# Patient Record
Sex: MALE | Race: WHITE | Employment: OTHER | URBAN - METROPOLITAN AREA
[De-identification: names, ages, dates, MRNs, and addresses within clinical notes are randomized per-mention and may not be internally consistent; named-entity substitution may affect disease eponyms.]

---

## 2018-09-02 ENCOUNTER — HOSPITAL ENCOUNTER (OUTPATIENT)
Age: 68
Discharge: HOME | End: 2018-09-02
Attending: EMERGENCY MEDICINE
Payer: MEDICARE

## 2018-09-02 VITALS
HEIGHT: 74 IN | HEART RATE: 90 BPM | DIASTOLIC BLOOD PRESSURE: 95 MMHG | BODY MASS INDEX: 30.16 KG/M2 | RESPIRATION RATE: 18 BRPM | WEIGHT: 235 LBS | OXYGEN SATURATION: 98 % | TEMPERATURE: 99 F | SYSTOLIC BLOOD PRESSURE: 188 MMHG

## 2018-09-02 DIAGNOSIS — J01.90 ACUTE SINUSITIS, RECURRENCE NOT SPECIFIED, UNSPECIFIED LOCATION: Primary | ICD-10-CM

## 2018-09-02 PROCEDURE — 99203 OFFICE O/P NEW LOW 30 MIN: CPT

## 2018-09-02 PROCEDURE — 99204 OFFICE O/P NEW MOD 45 MIN: CPT

## 2018-09-02 RX ORDER — AMOXICILLIN 875 MG/1
875 TABLET, COATED ORAL 2 TIMES DAILY
Qty: 20 TABLET | Refills: 0 | Status: SHIPPED | OUTPATIENT
Start: 2018-09-02 | End: 2018-09-12

## 2018-09-02 NOTE — ED INITIAL ASSESSMENT (HPI)
REPORTS SINUS CONGESTION, ESPECIALLY IN THE MORNING, FOR THE PAST WEEK. DENIES FEVERS, DENIES SORE THROAT.

## 2018-09-02 NOTE — ED PROVIDER NOTES
[facility]    History   Patient presents with:  Cough/URI    Stated Complaint: SINUS PAIN    HPI    Patient 65-year-old male that complains of 7-10 days of sinus drainage and congestion.   He states he wak guarding. Musculoskeletal: Normal range of motion. Exhibits no edema or tenderness. Lymphadenopathy: No cervical adenopathy. Neurological: Alert and oriented to person, place, and time. Normal reflexes. No cranial nerve deficit.  No motor os sensory d

## 2019-01-03 ENCOUNTER — OFFICE VISIT (OUTPATIENT)
Dept: OTOLARYNGOLOGY | Facility: CLINIC | Age: 69
End: 2019-01-03
Payer: MEDICARE

## 2019-01-03 VITALS
TEMPERATURE: 98 F | HEIGHT: 74 IN | WEIGHT: 212 LBS | DIASTOLIC BLOOD PRESSURE: 88 MMHG | SYSTOLIC BLOOD PRESSURE: 151 MMHG | BODY MASS INDEX: 27.21 KG/M2

## 2019-01-03 DIAGNOSIS — J33.9 NASAL POLYPS: Primary | ICD-10-CM

## 2019-01-03 PROCEDURE — 99204 OFFICE O/P NEW MOD 45 MIN: CPT | Performed by: OTOLARYNGOLOGY

## 2019-01-03 PROCEDURE — G0463 HOSPITAL OUTPT CLINIC VISIT: HCPCS | Performed by: OTOLARYNGOLOGY

## 2019-01-03 RX ORDER — CETIRIZINE HYDROCHLORIDE 10 MG/1
10 TABLET ORAL DAILY
COMMUNITY
End: 2019-07-17

## 2019-01-03 RX ORDER — FLUTICASONE PROPIONATE 50 MCG
1 SPRAY, SUSPENSION (ML) NASAL 2 TIMES DAILY
Qty: 1 BOTTLE | Refills: 3 | Status: SHIPPED | OUTPATIENT
Start: 2019-01-03 | End: 2020-01-13

## 2019-01-03 RX ORDER — CLINDAMYCIN HYDROCHLORIDE 300 MG/1
300 CAPSULE ORAL 3 TIMES DAILY
Qty: 42 CAPSULE | Refills: 0 | Status: SHIPPED | OUTPATIENT
Start: 2019-01-03 | End: 2019-01-17

## 2019-01-03 RX ORDER — METHYLPREDNISOLONE 4 MG/1
TABLET ORAL
Qty: 1 PACKAGE | Refills: 0 | Status: SHIPPED | OUTPATIENT
Start: 2019-01-03 | End: 2019-07-17

## 2019-01-03 NOTE — PROGRESS NOTES
Jessika Vasquez. is a 79year old male. Patient presents with:  Nasal Congestion: nasal congestion for 1 month      HISTORY OF PRESENT ILLNESS   1/3/2019    The patient presents with nasal obstruction for 4 months.   He noted that started in August and he and rash. Hema/Lymph Negative Easy bleeding and easy bruising.            PHYSICAL EXAM    /88   Temp 97.5 °F (36.4 °C) (Tympanic)   Ht 6' 2\" (1.88 m)   Wt 212 lb (96.2 kg)   BMI 27.22 kg/m²        Constitutional Normal Overall appearance - Normal. erythematous and I think he has a polyp in the right nasal cavity I recommend that we treat him with steroids and antibiotics and Flonase and also get a CAT scan of the sinuses and have him return.  The risks of steroids were discussed at length with the pa

## 2019-01-21 ENCOUNTER — OFFICE VISIT (OUTPATIENT)
Dept: OTOLARYNGOLOGY | Facility: CLINIC | Age: 69
End: 2019-01-21
Payer: MEDICARE

## 2019-01-21 VITALS
SYSTOLIC BLOOD PRESSURE: 145 MMHG | WEIGHT: 212 LBS | BODY MASS INDEX: 27.21 KG/M2 | TEMPERATURE: 97 F | HEIGHT: 74 IN | DIASTOLIC BLOOD PRESSURE: 81 MMHG

## 2019-01-21 DIAGNOSIS — J33.9 NASAL POLYPS: Primary | ICD-10-CM

## 2019-01-21 PROCEDURE — G0463 HOSPITAL OUTPT CLINIC VISIT: HCPCS | Performed by: OTOLARYNGOLOGY

## 2019-01-21 PROCEDURE — 99214 OFFICE O/P EST MOD 30 MIN: CPT | Performed by: OTOLARYNGOLOGY

## 2019-01-21 NOTE — PROGRESS NOTES
Megan Husain. is a 79year old male. Patient presents with:   Follow - Up: nasal polyps and sinusitis: pt reports feeling much better, slight nasal congestion      HISTORY OF PRESENT ILLNESS   1/3/2019    The patient presents with nasal obstruction for heartbeat/palpitations and syncope. GI Negative Abdominal pain and diarrhea. Endocrine Negative Cold intolerance and heat intolerance. Neuro Negative Tremors. Psych Negative Anxiety and depression.    Integumentary Negative Frequent skin infections, route., Disp: 1 Package, Rfl: 0  ASSESSMENT AND PLAN    1.  Nasal polyps and sinusitis  Greatly improved after treatment with antibiotics and steroids I did discuss the fact that his increased breathing is just a temporary condition because he has polyps wh

## 2019-02-04 ENCOUNTER — HOSPITAL ENCOUNTER (OUTPATIENT)
Dept: CT IMAGING | Facility: HOSPITAL | Age: 69
Discharge: HOME | End: 2019-02-04
Attending: OTOLARYNGOLOGY
Payer: MEDICARE

## 2019-02-04 DIAGNOSIS — J33.9 NASAL POLYPS: ICD-10-CM

## 2019-02-04 PROCEDURE — 70486 CT MAXILLOFACIAL W/O DYE: CPT | Performed by: OTOLARYNGOLOGY

## 2019-02-05 ENCOUNTER — TELEPHONE (OUTPATIENT)
Dept: OTOLARYNGOLOGY | Facility: CLINIC | Age: 69
End: 2019-02-05

## 2019-02-06 ENCOUNTER — OFFICE VISIT (OUTPATIENT)
Dept: OTOLARYNGOLOGY | Facility: CLINIC | Age: 69
End: 2019-02-06
Payer: MEDICARE

## 2019-02-06 VITALS
SYSTOLIC BLOOD PRESSURE: 124 MMHG | DIASTOLIC BLOOD PRESSURE: 80 MMHG | WEIGHT: 212 LBS | TEMPERATURE: 98 F | HEIGHT: 74 IN | BODY MASS INDEX: 27.21 KG/M2

## 2019-02-06 DIAGNOSIS — J33.9 NASAL POLYPS: Primary | ICD-10-CM

## 2019-02-06 DIAGNOSIS — Z01.818 PRE-OP TESTING: ICD-10-CM

## 2019-02-06 PROCEDURE — 99214 OFFICE O/P EST MOD 30 MIN: CPT | Performed by: OTOLARYNGOLOGY

## 2019-02-06 PROCEDURE — G0463 HOSPITAL OUTPT CLINIC VISIT: HCPCS | Performed by: OTOLARYNGOLOGY

## 2019-02-06 NOTE — PROGRESS NOTES
Tor Hurtado. is a 79year old male. Patient presents with:  Results: ct scan done  on 2/4/19      HISTORY OF PRESENT ILLNESS   1/3/2019    The patient presents with nasal obstruction for 4 months.   He noted that started in August and he went into imme Abdominal pain and diarrhea. Endocrine Negative Cold intolerance and heat intolerance. Neuro Negative Tremors. Psych Negative Anxiety and depression. Integumentary Negative Frequent skin infections, pigment change and rash.    Hema/Lymph Negative Ea Nasal polyps and sinusitis  The risks and alternatives   to sinus surgery, including intranasal polypectomy,septal repair and turbinate reduction  were discussed at length with the patient.  I showed the patient's the CAT scan of his sinuses and demonstrate

## 2019-05-28 ENCOUNTER — TELEPHONE (OUTPATIENT)
Dept: OTOLARYNGOLOGY | Facility: CLINIC | Age: 69
End: 2019-05-28

## 2019-06-27 ENCOUNTER — HOSPITAL ENCOUNTER (OUTPATIENT)
Dept: GENERAL RADIOLOGY | Facility: HOSPITAL | Age: 69
Discharge: HOME | End: 2019-06-27
Attending: OTOLARYNGOLOGY
Payer: MEDICARE

## 2019-06-27 ENCOUNTER — APPOINTMENT (OUTPATIENT)
Dept: LAB | Facility: HOSPITAL | Age: 69
End: 2019-06-27
Attending: OTOLARYNGOLOGY
Payer: MEDICARE

## 2019-06-27 DIAGNOSIS — Z01.818 PRE-OP TESTING: ICD-10-CM

## 2019-06-27 DIAGNOSIS — Z01.818 PREOP TESTING: ICD-10-CM

## 2019-06-27 PROCEDURE — 93010 ELECTROCARDIOGRAM REPORT: CPT | Performed by: OTOLARYNGOLOGY

## 2019-06-27 PROCEDURE — 71046 X-RAY EXAM CHEST 2 VIEWS: CPT | Performed by: OTOLARYNGOLOGY

## 2019-06-27 PROCEDURE — 93005 ELECTROCARDIOGRAM TRACING: CPT

## 2019-06-28 ENCOUNTER — OFFICE VISIT (OUTPATIENT)
Dept: OTOLARYNGOLOGY | Facility: CLINIC | Age: 69
End: 2019-06-28
Payer: MEDICARE

## 2019-06-28 VITALS
WEIGHT: 210 LBS | SYSTOLIC BLOOD PRESSURE: 147 MMHG | HEIGHT: 74 IN | TEMPERATURE: 98 F | BODY MASS INDEX: 26.95 KG/M2 | DIASTOLIC BLOOD PRESSURE: 81 MMHG

## 2019-06-28 DIAGNOSIS — J33.9 NASAL POLYPS: Primary | ICD-10-CM

## 2019-06-28 PROCEDURE — G0463 HOSPITAL OUTPT CLINIC VISIT: HCPCS | Performed by: OTOLARYNGOLOGY

## 2019-06-28 PROCEDURE — 99214 OFFICE O/P EST MOD 30 MIN: CPT | Performed by: OTOLARYNGOLOGY

## 2019-06-28 NOTE — PROGRESS NOTES
Julia Hardin. is a 81year old male. Patient presents with:  Nose Problem: pt would like to discuss surgery       HISTORY OF PRESENT ILLNESS   1/3/2019    The patient presents with nasal obstruction for 4 months.   He noted that started in August and he family history. History reviewed. No pertinent past medical history. History reviewed. No pertinent surgical history. REVIEW OF SYSTEMS    System Neg/Pos Details   Constitutional Negative Fatigue, fever and weight loss.    ENMT Negative Headaches a Septum deviated with turbinate hypertrophy bilaterally mucosa congestion. Polyps noted    Scan was reviewed at length. I showed him the images in detail.   He has diffuse nasal polyposis filling all the sinuses frontal sphenoid ethmoid maxillary sinusitis

## 2019-07-01 ENCOUNTER — TELEPHONE (OUTPATIENT)
Dept: OTOLARYNGOLOGY | Facility: CLINIC | Age: 69
End: 2019-07-01

## 2019-07-01 RX ORDER — PREDNISONE 20 MG/1
40 TABLET ORAL DAILY
Qty: 8 TABLET | Refills: 0 | Status: SHIPPED | OUTPATIENT
Start: 2019-07-17 | End: 2019-07-21

## 2019-07-01 NOTE — TELEPHONE ENCOUNTER
Received a return call from patient and informed of patient note below ,patient verbalized understanding.

## 2019-07-01 NOTE — TELEPHONE ENCOUNTER
Pt states he was supposed to be prescribed prednisone on 6/28, [facility] has not received rx. Pls send thank you.

## 2019-07-01 NOTE — TELEPHONE ENCOUNTER
LMTCB to inform patient of Rx Prednisone were sent to requested pharmacy,please give patient instruction to take the medicine start on 7/17/19 x 4 days prior to surgery on 7/22/19.

## 2019-07-18 ENCOUNTER — TELEPHONE (OUTPATIENT)
Dept: OTOLARYNGOLOGY | Facility: CLINIC | Age: 69
End: 2019-07-18

## 2019-07-18 NOTE — H&P
Ann-Marie Liu. is a 81year old male. Patient presents with:  Nose Problem: pt would like to discuss surgery         HISTORY OF PRESENT ILLNESS   1/3/2019    The patient presents with nasal obstruction for 4 months.   He noted that started in August and pertinent family history. History reviewed. No pertinent past medical history.     History reviewed. No pertinent surgical history.        REVIEW OF SYSTEMS     System Neg/Pos Details   Constitutional Negative Fatigue, fever and weight loss.    [facility] Nares - Right: Normal Left: Normal. Septum deviated with turbinate hypertrophy bilaterally mucosa congestion. Polyps noted    Scan was reviewed at length. I showed him the images in detail.   He has diffuse nasal polyposis filling all the sinuses frontal

## 2019-07-18 NOTE — TELEPHONE ENCOUNTER
Pt informed will pain medication prescription will not be given before surgery. Pt will receive both prescriptions on the day of surgery.

## 2019-07-18 NOTE — TELEPHONE ENCOUNTER
No answer.  Message left to call the office back.   Pt. States that he is sched to have surgery on Monday 7/22/19, and wants to know if he is able to get his Rx's in advance, instead of waiting to get scripts after his surgery?

## 2019-07-22 ENCOUNTER — ANESTHESIA (OUTPATIENT)
Dept: PREOP | Facility: HOSPITAL | Age: 69
End: 2019-07-22
Payer: MEDICARE

## 2019-07-22 ENCOUNTER — HOSPITAL ENCOUNTER (OUTPATIENT)
Facility: HOSPITAL | Age: 69
Setting detail: HOSPITAL OUTPATIENT SURGERY
Discharge: HOME | End: 2019-07-22
Attending: OTOLARYNGOLOGY | Admitting: OTOLARYNGOLOGY
Payer: MEDICARE

## 2019-07-22 ENCOUNTER — ANESTHESIA EVENT (OUTPATIENT)
Dept: PREOP | Facility: HOSPITAL | Age: 69
End: 2019-07-22
Payer: MEDICARE

## 2019-07-22 VITALS
BODY MASS INDEX: 30.29 KG/M2 | TEMPERATURE: 98 F | OXYGEN SATURATION: 98 % | WEIGHT: 236 LBS | SYSTOLIC BLOOD PRESSURE: 172 MMHG | DIASTOLIC BLOOD PRESSURE: 81 MMHG | HEART RATE: 65 BPM | HEIGHT: 74 IN | RESPIRATION RATE: 16 BRPM

## 2019-07-22 DIAGNOSIS — J32.0 CHRONIC MAXILLARY SINUSITIS: ICD-10-CM

## 2019-07-22 DIAGNOSIS — J32.1 CHRONIC FRONTAL SINUSITIS: ICD-10-CM

## 2019-07-22 DIAGNOSIS — J32.2 CHRONIC ETHMOIDAL SINUSITIS: ICD-10-CM

## 2019-07-22 DIAGNOSIS — J32.3 CHRONIC SPHENOIDAL SINUSITIS: ICD-10-CM

## 2019-07-22 DIAGNOSIS — J33.9 NOSE POLYP: ICD-10-CM

## 2019-07-22 DIAGNOSIS — J34.3 HYPERTROPHY OF NASAL TURBINATES: ICD-10-CM

## 2019-07-22 RX ORDER — HYDROMORPHONE HYDROCHLORIDE 1 MG/ML
0.4 INJECTION, SOLUTION INTRAMUSCULAR; INTRAVENOUS; SUBCUTANEOUS EVERY 5 MIN PRN
Status: DISCONTINUED | OUTPATIENT
Start: 2019-07-22 | End: 2019-07-22

## 2019-07-22 RX ORDER — SODIUM CHLORIDE 0.9 % (FLUSH) 0.9 %
10 SYRINGE (ML) INJECTION AS NEEDED
Status: CANCELLED | OUTPATIENT
Start: 2019-07-22

## 2019-07-22 RX ORDER — NALOXONE HYDROCHLORIDE 0.4 MG/ML
80 INJECTION, SOLUTION INTRAMUSCULAR; INTRAVENOUS; SUBCUTANEOUS AS NEEDED
Status: DISCONTINUED | OUTPATIENT
Start: 2019-07-22 | End: 2019-07-22

## 2019-07-22 RX ORDER — HYDROMORPHONE HYDROCHLORIDE 1 MG/ML
0.2 INJECTION, SOLUTION INTRAMUSCULAR; INTRAVENOUS; SUBCUTANEOUS EVERY 5 MIN PRN
Status: DISCONTINUED | OUTPATIENT
Start: 2019-07-22 | End: 2019-07-22

## 2019-07-22 RX ORDER — SODIUM CHLORIDE, SODIUM LACTATE, POTASSIUM CHLORIDE, CALCIUM CHLORIDE 600; 310; 30; 20 MG/100ML; MG/100ML; MG/100ML; MG/100ML
INJECTION, SOLUTION INTRAVENOUS CONTINUOUS
Status: DISCONTINUED | OUTPATIENT
Start: 2019-07-22 | End: 2019-07-22

## 2019-07-22 RX ORDER — DEXAMETHASONE 6 MG/1
TABLET ORAL
Qty: 4 TABLET | Refills: 0 | Status: SHIPPED | OUTPATIENT
Start: 2019-07-24 | End: 2019-07-22

## 2019-07-22 RX ORDER — MORPHINE SULFATE 2 MG/ML
2 INJECTION, SOLUTION INTRAMUSCULAR; INTRAVENOUS EVERY 10 MIN PRN
Status: DISCONTINUED | OUTPATIENT
Start: 2019-07-22 | End: 2019-07-22

## 2019-07-22 RX ORDER — ACETAMINOPHEN 500 MG
1000 TABLET ORAL ONCE
Status: COMPLETED | OUTPATIENT
Start: 2019-07-22 | End: 2019-07-22

## 2019-07-22 RX ORDER — HYDROCODONE BITARTRATE AND ACETAMINOPHEN 5; 325 MG/1; MG/1
1 TABLET ORAL AS NEEDED
Status: DISCONTINUED | OUTPATIENT
Start: 2019-07-22 | End: 2019-07-22

## 2019-07-22 RX ORDER — HYDROMORPHONE HYDROCHLORIDE 1 MG/ML
0.6 INJECTION, SOLUTION INTRAMUSCULAR; INTRAVENOUS; SUBCUTANEOUS EVERY 5 MIN PRN
Status: DISCONTINUED | OUTPATIENT
Start: 2019-07-22 | End: 2019-07-22

## 2019-07-22 RX ORDER — AMOXICILLIN 500 MG/1
500 CAPSULE ORAL 3 TIMES DAILY
Qty: 21 CAPSULE | Refills: 0 | Status: SHIPPED | OUTPATIENT
Start: 2019-07-22 | End: 2019-07-22

## 2019-07-22 RX ORDER — METOCLOPRAMIDE 10 MG/1
10 TABLET ORAL ONCE
Status: COMPLETED | OUTPATIENT
Start: 2019-07-22 | End: 2019-07-22

## 2019-07-22 RX ORDER — PROCHLORPERAZINE EDISYLATE 5 MG/ML
5 INJECTION INTRAMUSCULAR; INTRAVENOUS ONCE AS NEEDED
Status: DISCONTINUED | OUTPATIENT
Start: 2019-07-22 | End: 2019-07-22

## 2019-07-22 RX ORDER — ONDANSETRON 2 MG/ML
4 INJECTION INTRAMUSCULAR; INTRAVENOUS ONCE AS NEEDED
Status: DISCONTINUED | OUTPATIENT
Start: 2019-07-22 | End: 2019-07-22

## 2019-07-22 RX ORDER — HYDROCODONE BITARTRATE AND ACETAMINOPHEN 5; 325 MG/1; MG/1
2 TABLET ORAL AS NEEDED
Status: DISCONTINUED | OUTPATIENT
Start: 2019-07-22 | End: 2019-07-22

## 2019-07-22 RX ORDER — HYDROMORPHONE HYDROCHLORIDE 1 MG/ML
0.4 INJECTION, SOLUTION INTRAMUSCULAR; INTRAVENOUS; SUBCUTANEOUS
Status: CANCELLED | OUTPATIENT
Start: 2019-07-22 | End: 2019-07-22

## 2019-07-22 RX ORDER — MORPHINE SULFATE 4 MG/ML
4 INJECTION, SOLUTION INTRAMUSCULAR; INTRAVENOUS EVERY 10 MIN PRN
Status: DISCONTINUED | OUTPATIENT
Start: 2019-07-22 | End: 2019-07-22

## 2019-07-22 RX ORDER — MORPHINE SULFATE 10 MG/ML
6 INJECTION, SOLUTION INTRAMUSCULAR; INTRAVENOUS EVERY 10 MIN PRN
Status: DISCONTINUED | OUTPATIENT
Start: 2019-07-22 | End: 2019-07-22

## 2019-07-22 RX ORDER — HYDROCODONE BITARTRATE AND ACETAMINOPHEN 7.5; 325 MG/1; MG/1
1 TABLET ORAL EVERY 6 HOURS PRN
Qty: 15 TABLET | Refills: 0 | Status: SHIPPED | OUTPATIENT
Start: 2019-07-22 | End: 2019-07-22

## 2019-07-22 RX ORDER — FAMOTIDINE 20 MG/1
20 TABLET ORAL ONCE
Status: COMPLETED | OUTPATIENT
Start: 2019-07-22 | End: 2019-07-22

## 2019-07-22 NOTE — INTERVAL H&P NOTE
Pre-op Diagnosis: Chronic ethmoidal sinusitis [J32.2]  Chronic sphenoidal sinusitis [J32.3]  Chronic maxillary sinusitis [J32.0]  Chronic frontal sinusitis [J32.1]  Hypertrophy of nasal turbinates [J34.3]  Nose polyp [J33.9]    The above referenced H&P was

## 2019-07-22 NOTE — ANESTHESIA PREPROCEDURE EVALUATION
Anesthesia PreOp Note    HPI:     Reeseville Range. is a 81year old male who presents for preoperative consultation requested by: Callum Posadas MD    Date of Surgery: 7/22/2019    Procedure(s):  NASAL SEPTOPLASTY BILAT SINUS ENDOSCOPY ETHM MAX  Indicati Occupational History      Not on file    Social Needs      Financial resource strain: Not on file      Food insecurity:        Worry: Not on file        Inability: Not on file      Transportation needs:        Medical: Not on file        Non-medical: Not o negative ROS and normal exam   Cardiovascular - negative ROS and normal exam    Neuro/Psych - negative ROS     GI/Hepatic/Renal - negative ROS     Endo/Other - negative ROS   Abdominal  - normal exam  (+) obese,                Anesthesia Plan:   ASA:  2  P

## 2019-07-22 NOTE — PROGRESS NOTES
Case cancelled by anesthesia due to concerns about pts underlying health.  Recommend medical clearance prior to rescheduling

## 2019-07-22 NOTE — OR PREOP
Patient's surgery cancelled by Dr Jeronimo Lipscomb. Patient instructed to follow up with his primary doctor for surgical clearance. IV discontinued. Patient getting dressed. Patient waiting for sister to  him up.

## 2019-07-23 ENCOUNTER — TELEPHONE (OUTPATIENT)
Dept: OTOLARYNGOLOGY | Facility: CLINIC | Age: 69
End: 2019-07-23

## 2019-07-23 NOTE — TELEPHONE ENCOUNTER
Sx cancelled on 07/22/19 due to poor circulation. Per JMK patient will need to get medical clearance prior to rescheduling surgery. LMTCB to inform.

## 2019-09-23 ENCOUNTER — OFFICE VISIT (OUTPATIENT)
Dept: INTERNAL MEDICINE CLINIC | Facility: CLINIC | Age: 69
End: 2019-09-23
Payer: MEDICARE

## 2019-09-23 VITALS
DIASTOLIC BLOOD PRESSURE: 88 MMHG | HEART RATE: 86 BPM | SYSTOLIC BLOOD PRESSURE: 160 MMHG | HEIGHT: 71.3 IN | WEIGHT: 238.19 LBS | BODY MASS INDEX: 32.98 KG/M2 | TEMPERATURE: 99 F | OXYGEN SATURATION: 98 %

## 2019-09-23 DIAGNOSIS — Z12.5 SCREENING PSA (PROSTATE SPECIFIC ANTIGEN): ICD-10-CM

## 2019-09-23 DIAGNOSIS — J33.9 NASAL POLYPOSIS: ICD-10-CM

## 2019-09-23 DIAGNOSIS — I83.93 VARICOSE VEINS OF BOTH LOWER EXTREMITIES, UNSPECIFIED WHETHER COMPLICATED: ICD-10-CM

## 2019-09-23 DIAGNOSIS — M20.60 DEFORMITY OF TOE, UNSPECIFIED LATERALITY: ICD-10-CM

## 2019-09-23 DIAGNOSIS — I10 BENIGN HYPERTENSION: Primary | ICD-10-CM

## 2019-09-23 PROCEDURE — 99204 OFFICE O/P NEW MOD 45 MIN: CPT | Performed by: INTERNAL MEDICINE

## 2019-09-23 PROCEDURE — G0463 HOSPITAL OUTPT CLINIC VISIT: HCPCS | Performed by: INTERNAL MEDICINE

## 2019-09-23 RX ORDER — AMLODIPINE BESYLATE 5 MG/1
5 TABLET ORAL DAILY
Qty: 30 TABLET | Refills: 5 | Status: SHIPPED | OUTPATIENT
Start: 2019-09-23 | End: 2019-10-07

## 2019-09-23 NOTE — PROGRESS NOTES
Shanique Faria. is a 81year old male. HPI:   Patient presents with:  Establish Care: Hasn't had a PCP in 30 years. plans to schedule nasal polyp removal with Dr. Maximiliano Rogers.       Rob Benitez is 82 y/o M here to establish care; has nasal polyps for which he sees Negative for dysuria or polyuria  Hema/Lymph:  Negative for easy bleeding and easy bruising  Integumentary:  Negative for pruritus and rash  Neurological:  Negative for gait disturbance; negative for paresthesias   All other review of systems are negative. (5 mg total) by mouth daily.        Imaging & Referrals:  None     9/23/2019  Lala Colby MD

## 2019-09-25 ENCOUNTER — LAB ENCOUNTER (OUTPATIENT)
Dept: LAB | Age: 69
End: 2019-09-25
Attending: INTERNAL MEDICINE
Payer: MEDICARE

## 2019-09-25 DIAGNOSIS — Z12.5 SCREENING PSA (PROSTATE SPECIFIC ANTIGEN): ICD-10-CM

## 2019-09-25 DIAGNOSIS — I10 BENIGN HYPERTENSION: ICD-10-CM

## 2019-09-25 LAB
ALBUMIN SERPL-MCNC: 3.9 G/DL (ref 3.4–5)
ALBUMIN/GLOB SERPL: 1 {RATIO} (ref 1–2)
ALP LIVER SERPL-CCNC: 55 U/L (ref 45–117)
ALT SERPL-CCNC: 27 U/L (ref 16–61)
ANION GAP SERPL CALC-SCNC: 4 MMOL/L (ref 0–18)
AST SERPL-CCNC: 20 U/L (ref 15–37)
BASOPHILS # BLD AUTO: 0.1 X10(3) UL (ref 0–0.2)
BASOPHILS NFR BLD AUTO: 1.2 %
BILIRUB SERPL-MCNC: 0.7 MG/DL (ref 0.1–2)
BUN BLD-MCNC: 15 MG/DL (ref 7–18)
BUN/CREAT SERPL: 13.8 (ref 10–20)
CALCIUM BLD-MCNC: 9 MG/DL (ref 8.5–10.1)
CHLORIDE SERPL-SCNC: 106 MMOL/L (ref 98–112)
CHOLEST SMN-MCNC: 239 MG/DL (ref ?–200)
CO2 SERPL-SCNC: 30 MMOL/L (ref 21–32)
COMPLEXED PSA SERPL-MCNC: 2.95 NG/ML (ref ?–4)
CREAT BLD-MCNC: 1.09 MG/DL (ref 0.7–1.3)
DEPRECATED RDW RBC AUTO: 42.8 FL (ref 35.1–46.3)
EOSINOPHIL # BLD AUTO: 0.6 X10(3) UL (ref 0–0.7)
EOSINOPHIL NFR BLD AUTO: 7.5 %
ERYTHROCYTE [DISTWIDTH] IN BLOOD BY AUTOMATED COUNT: 13.2 % (ref 11–15)
GLOBULIN PLAS-MCNC: 3.8 G/DL (ref 2.8–4.4)
GLUCOSE BLD-MCNC: 106 MG/DL (ref 70–99)
HCT VFR BLD AUTO: 46.3 % (ref 39–53)
HDLC SERPL-MCNC: 31 MG/DL (ref 40–59)
HGB BLD-MCNC: 15.3 G/DL (ref 13–17.5)
IMM GRANULOCYTES # BLD AUTO: 0.01 X10(3) UL (ref 0–1)
IMM GRANULOCYTES NFR BLD: 0.1 %
LDLC SERPL CALC-MCNC: 145 MG/DL (ref ?–100)
LYMPHOCYTES # BLD AUTO: 2.66 X10(3) UL (ref 1–4)
LYMPHOCYTES NFR BLD AUTO: 33.1 %
M PROTEIN MFR SERPL ELPH: 7.7 G/DL (ref 6.4–8.2)
MCH RBC QN AUTO: 29 PG (ref 26–34)
MCHC RBC AUTO-ENTMCNC: 33 G/DL (ref 31–37)
MCV RBC AUTO: 87.9 FL (ref 80–100)
MONOCYTES # BLD AUTO: 0.59 X10(3) UL (ref 0.1–1)
MONOCYTES NFR BLD AUTO: 7.3 %
NEUTROPHILS # BLD AUTO: 4.08 X10 (3) UL (ref 1.5–7.7)
NEUTROPHILS # BLD AUTO: 4.08 X10(3) UL (ref 1.5–7.7)
NEUTROPHILS NFR BLD AUTO: 50.8 %
NONHDLC SERPL-MCNC: 208 MG/DL (ref ?–130)
OSMOLALITY SERPL CALC.SUM OF ELEC: 291 MOSM/KG (ref 275–295)
PATIENT FASTING: YES
PATIENT FASTING: YES
PLATELET # BLD AUTO: 195 10(3)UL (ref 150–450)
POTASSIUM SERPL-SCNC: 4.4 MMOL/L (ref 3.5–5.1)
RBC # BLD AUTO: 5.27 X10(6)UL (ref 3.8–5.8)
SODIUM SERPL-SCNC: 140 MMOL/L (ref 136–145)
TRIGL SERPL-MCNC: 314 MG/DL (ref 30–149)
TSI SER-ACNC: 2.06 MIU/ML (ref 0.36–3.74)
VLDLC SERPL CALC-MCNC: 63 MG/DL (ref 0–30)
WBC # BLD AUTO: 8 X10(3) UL (ref 4–11)

## 2019-09-25 PROCEDURE — 36415 COLL VENOUS BLD VENIPUNCTURE: CPT

## 2019-09-25 PROCEDURE — 80053 COMPREHEN METABOLIC PANEL: CPT

## 2019-09-25 PROCEDURE — 80061 LIPID PANEL: CPT

## 2019-09-25 PROCEDURE — 85025 COMPLETE CBC W/AUTO DIFF WBC: CPT

## 2019-09-25 PROCEDURE — 84443 ASSAY THYROID STIM HORMONE: CPT

## 2019-10-07 ENCOUNTER — OFFICE VISIT (OUTPATIENT)
Dept: INTERNAL MEDICINE CLINIC | Facility: CLINIC | Age: 69
End: 2019-10-07
Payer: MEDICARE

## 2019-10-07 VITALS
HEIGHT: 71.3 IN | SYSTOLIC BLOOD PRESSURE: 150 MMHG | OXYGEN SATURATION: 99 % | DIASTOLIC BLOOD PRESSURE: 88 MMHG | TEMPERATURE: 99 F | WEIGHT: 242.38 LBS | HEART RATE: 75 BPM | BODY MASS INDEX: 33.56 KG/M2

## 2019-10-07 DIAGNOSIS — Z23 NEED FOR INFLUENZA VACCINATION: ICD-10-CM

## 2019-10-07 DIAGNOSIS — I10 BENIGN HYPERTENSION: Primary | ICD-10-CM

## 2019-10-07 DIAGNOSIS — J33.9 NASAL POLYPOSIS: ICD-10-CM

## 2019-10-07 PROCEDURE — 90662 IIV NO PRSV INCREASED AG IM: CPT | Performed by: INTERNAL MEDICINE

## 2019-10-07 PROCEDURE — 99213 OFFICE O/P EST LOW 20 MIN: CPT | Performed by: INTERNAL MEDICINE

## 2019-10-07 PROCEDURE — G0463 HOSPITAL OUTPT CLINIC VISIT: HCPCS | Performed by: INTERNAL MEDICINE

## 2019-10-07 PROCEDURE — G0008 ADMIN INFLUENZA VIRUS VAC: HCPCS | Performed by: INTERNAL MEDICINE

## 2019-10-07 RX ORDER — AMLODIPINE BESYLATE 10 MG/1
10 TABLET ORAL DAILY
Qty: 30 TABLET | Refills: 5 | Status: SHIPPED | OUTPATIENT
Start: 2019-10-07 | End: 2020-02-21

## 2019-10-07 NOTE — PROGRESS NOTES
Misty Herron. is a 81year old male.     HPI:   Patient presents with:  Checkup: 2 week       82 y/o M with HTN here for F/U; on amlodipine 5 mg po qD;  no CP; no SOB; no headaches; no palpitation        HISTORY:  Past Medical History:   Diagnosis Date non-distended  Extremities: no clubbing, cyanosis or edema           ASSESSMENT/PLAN:   HTN  New-onset;  on amlodipine 5 mg po qD  -increase amlodipine to 10 mg po qD; re-check BP in 2 weeks     Nasal polyposis  Awaits polypectomy with ENT Dr Gabriel Castillo

## 2019-10-21 ENCOUNTER — OFFICE VISIT (OUTPATIENT)
Dept: INTERNAL MEDICINE CLINIC | Facility: CLINIC | Age: 69
End: 2019-10-21
Payer: MEDICARE

## 2019-10-21 VITALS
WEIGHT: 242 LBS | HEART RATE: 68 BPM | BODY MASS INDEX: 33 KG/M2 | SYSTOLIC BLOOD PRESSURE: 132 MMHG | RESPIRATION RATE: 12 BRPM | OXYGEN SATURATION: 98 % | TEMPERATURE: 98 F | DIASTOLIC BLOOD PRESSURE: 72 MMHG

## 2019-10-21 DIAGNOSIS — I10 BENIGN HYPERTENSION: Primary | ICD-10-CM

## 2019-10-21 DIAGNOSIS — M20.60 DEFORMITY OF TOE, UNSPECIFIED LATERALITY: ICD-10-CM

## 2019-10-21 DIAGNOSIS — J33.9 NASAL POLYPOSIS: ICD-10-CM

## 2019-10-21 PROCEDURE — G0463 HOSPITAL OUTPT CLINIC VISIT: HCPCS | Performed by: INTERNAL MEDICINE

## 2019-10-21 PROCEDURE — 99214 OFFICE O/P EST MOD 30 MIN: CPT | Performed by: INTERNAL MEDICINE

## 2019-10-21 NOTE — PROGRESS NOTES
Ole Ocasio. is a 81year old male. HPI:   Patient presents with: Follow - Up: 2 week follow up.  Pt reports he has been feeling well.       80 y/o M with HTN here for F/U; SBP 130s on amlodipine 10 mg po qD;  no CP; no SOB; no headaches; no palpi murmur  Respiratory: lungs without crackles or wheezes  Abdomen: normoactive bowel sounds, soft, non-tender and non-distended  Extremities: no clubbing, cyanosis or edema           ASSESSMENT/PLAN:   HTN  SBP 130s on amlodipine 10 mg po qD; CPM     Nasal p

## 2019-11-27 ENCOUNTER — TELEPHONE (OUTPATIENT)
Dept: OTOLARYNGOLOGY | Facility: CLINIC | Age: 69
End: 2019-11-27

## 2019-11-27 ENCOUNTER — TELEPHONE (OUTPATIENT)
Dept: INTERNAL MEDICINE CLINIC | Facility: CLINIC | Age: 69
End: 2019-11-27

## 2019-11-27 NOTE — TELEPHONE ENCOUNTER
I have written letter for clearance for surgery (see Communications); please FAX to Dr Doreen Nolen office at [facility]; he does not need to see me prior to planned procedure with DR Jose Angel Goode; please call pt

## 2019-11-27 NOTE — TELEPHONE ENCOUNTER
Called patient back. Informed we need medical clearance prior to scheduling surgery again. Per patient will contact his podiatrist to get clearance. States pcp already had cleared him. Will contact pcp office.

## 2019-11-27 NOTE — TELEPHONE ENCOUNTER
As FYI to LIBBY BERNARD - yifan Tabares from DR. Ch office - patient will have sinus surgery - was cancelled in July due to poor circulation in feet which needs to be adressed   LMTCB for patient to schedule pre - op visit

## 2019-11-27 NOTE — TELEPHONE ENCOUNTER
Yu Isidro called from Dr Jamin Alegria office, pt is being scheduled for surgery, need clearance prior  Need letter from Dr Hany Gordon stating pt is cleared for surgery also to include pt circulation is fine, ok to proceed   Ok to enter in [address] [place] [address] Rd   Tasked to nursing

## 2019-11-29 NOTE — TELEPHONE ENCOUNTER
Spoke w/pt regarding message; pt stated he would like to keep his 12/16 appt to follow up with Dr. Silva Prior; pt understands surgical clearance is not needed, no further questions asked

## 2019-12-04 RX ORDER — PREDNISONE 20 MG/1
40 TABLET ORAL DAILY
Qty: 8 TABLET | Refills: 0 | Status: SHIPPED | OUTPATIENT
Start: 2019-12-23 | End: 2019-12-04

## 2019-12-04 RX ORDER — PREDNISONE 20 MG/1
40 TABLET ORAL DAILY
Qty: 8 TABLET | Refills: 0 | Status: SHIPPED | OUTPATIENT
Start: 2019-12-23 | End: 2020-02-21 | Stop reason: ALTCHOICE

## 2019-12-04 NOTE — TELEPHONE ENCOUNTER
Patient contacted. Patient is scheduled for surgery with Dr Meghan Sim on 12/27/19. Pre post op instructions reviewed. NSAID instructions reviewed. Patient aware to take prednisone 4 days prior to sx. Will send Rx to phamacy.

## 2019-12-11 NOTE — H&P
Hugh Ray. is a 81year old male. Patient presents with:  Nose Problem: pt would like to discuss surgery         HISTORY OF PRESENT ILLNESS   1/3/2019    The patient presents with nasal obstruction for 4 months.   He noted that started in August and pertinent family history. History reviewed. No pertinent past medical history.     History reviewed. No pertinent surgical history.        REVIEW OF SYSTEMS     System Neg/Pos Details   Constitutional Negative Fatigue, fever and weight loss.    [facility] Nares - Right: Normal Left: Normal. Septum deviated with turbinate hypertrophy bilaterally mucosa congestion. Polyps noted    Scan was reviewed at length. I showed him the images in detail.   He has diffuse nasal polyposis filling all the sinuses frontal

## 2019-12-16 ENCOUNTER — OFFICE VISIT (OUTPATIENT)
Dept: INTERNAL MEDICINE CLINIC | Facility: CLINIC | Age: 69
End: 2019-12-16
Payer: MEDICARE

## 2019-12-16 VITALS
TEMPERATURE: 98 F | OXYGEN SATURATION: 99 % | HEIGHT: 71.3 IN | HEART RATE: 69 BPM | SYSTOLIC BLOOD PRESSURE: 142 MMHG | WEIGHT: 241 LBS | BODY MASS INDEX: 33.37 KG/M2 | DIASTOLIC BLOOD PRESSURE: 74 MMHG

## 2019-12-16 DIAGNOSIS — R60.0 BILATERAL LEG EDEMA: ICD-10-CM

## 2019-12-16 DIAGNOSIS — I87.2 VENOUS INSUFFICIENCY OF BOTH LOWER EXTREMITIES: ICD-10-CM

## 2019-12-16 DIAGNOSIS — I10 BENIGN HYPERTENSION: Primary | ICD-10-CM

## 2019-12-16 DIAGNOSIS — M20.60 DEFORMITY OF TOE, UNSPECIFIED LATERALITY: ICD-10-CM

## 2019-12-16 DIAGNOSIS — J33.9 NASAL POLYPOSIS: ICD-10-CM

## 2019-12-16 PROCEDURE — G0463 HOSPITAL OUTPT CLINIC VISIT: HCPCS | Performed by: INTERNAL MEDICINE

## 2019-12-16 PROCEDURE — 99214 OFFICE O/P EST MOD 30 MIN: CPT | Performed by: INTERNAL MEDICINE

## 2019-12-16 NOTE — PROGRESS NOTES
Sandrine Mcadams. is a 81year old male.     HPI:   Patient presents with:  Pre-Op: will have nose polyps removed 12/27 /2019-is cleared for surgery , is here for circulation issues       82 y/o M with multiple toe deformities, leg edema here for F/U; pt w Negative for chest pain; negative palpitations  Respiratory:  Negative for cough, dyspnea and wheezing  Endocrine:  Negative for abnormal sleep patterns, increased activity, polydipsia and polyphagia  Gastrointestinal:  Negative for abdominal pain, constip venous insufficiency on 1/24/19 at [place]; saw [address] surgery 11/26/19 and saw APN at [facility]; was advised for LE compression stockings; seeing PV surgery Dr Roel Angeles at [facility] in Feb 2020     PSA screening  PSA 2.95 in Sept 2019  [address] [facility]

## 2019-12-26 ENCOUNTER — TELEPHONE (OUTPATIENT)
Dept: OTOLARYNGOLOGY | Facility: CLINIC | Age: 69
End: 2019-12-26

## 2019-12-26 RX ORDER — PREDNISONE 20 MG/1
TABLET ORAL
Qty: 9 TABLET | Refills: 0 | Status: SHIPPED | OUTPATIENT
Start: 2019-12-28 | End: 2020-02-21 | Stop reason: ALTCHOICE

## 2019-12-26 RX ORDER — HYDROCODONE BITARTRATE AND ACETAMINOPHEN 7.5; 325 MG/1; MG/1
1 TABLET ORAL EVERY 6 HOURS PRN
Qty: 15 TABLET | Refills: 0 | Status: SHIPPED | OUTPATIENT
Start: 2019-12-26 | End: 2020-06-10

## 2019-12-26 RX ORDER — AMOXICILLIN 500 MG/1
500 CAPSULE ORAL 3 TIMES DAILY
Qty: 21 CAPSULE | Refills: 0 | Status: SHIPPED | OUTPATIENT
Start: 2019-12-26 | End: 2020-01-02

## 2019-12-26 NOTE — TELEPHONE ENCOUNTER
Callum Posadas MD  Em Ent Clinical Staff 1 hour ago (9:54 AM)      Please inform the patient that I called in medications for him for after surgery not before surgery there should be 3 at the pharmacy one is for pain one is an antibiotic and one is a stero

## 2019-12-27 ENCOUNTER — ANESTHESIA (OUTPATIENT)
Dept: SURGERY | Facility: HOSPITAL | Age: 69
End: 2019-12-27
Payer: MEDICARE

## 2019-12-27 ENCOUNTER — ANESTHESIA EVENT (OUTPATIENT)
Dept: SURGERY | Facility: HOSPITAL | Age: 69
End: 2019-12-27
Payer: MEDICARE

## 2019-12-27 ENCOUNTER — TELEPHONE (OUTPATIENT)
Dept: OTOLARYNGOLOGY | Facility: CLINIC | Age: 69
End: 2019-12-27

## 2019-12-27 ENCOUNTER — HOSPITAL ENCOUNTER (OUTPATIENT)
Facility: HOSPITAL | Age: 69
Setting detail: HOSPITAL OUTPATIENT SURGERY
Discharge: HOME | End: 2019-12-27
Attending: OTOLARYNGOLOGY | Admitting: OTOLARYNGOLOGY
Payer: MEDICARE

## 2019-12-27 VITALS
DIASTOLIC BLOOD PRESSURE: 69 MMHG | SYSTOLIC BLOOD PRESSURE: 148 MMHG | RESPIRATION RATE: 18 BRPM | TEMPERATURE: 97 F | WEIGHT: 246 LBS | HEART RATE: 66 BPM | OXYGEN SATURATION: 100 % | HEIGHT: 72 IN | BODY MASS INDEX: 33.32 KG/M2

## 2019-12-27 DIAGNOSIS — J33.9 NOSE POLYP: ICD-10-CM

## 2019-12-27 DIAGNOSIS — J32.3 CHRONIC SPHENOIDAL SINUSITIS: ICD-10-CM

## 2019-12-27 DIAGNOSIS — J32.0 CHRONIC MAXILLARY SINUSITIS: ICD-10-CM

## 2019-12-27 DIAGNOSIS — J34.3 HYPERTROPHY OF NASAL TURBINATES: ICD-10-CM

## 2019-12-27 DIAGNOSIS — J32.2 CHRONIC ETHMOIDAL SINUSITIS: ICD-10-CM

## 2019-12-27 DIAGNOSIS — J32.1 CHRONIC FRONTAL SINUSITIS: ICD-10-CM

## 2019-12-27 PROCEDURE — 09BR8ZZ EXCISION OF LEFT MAXILLARY SINUS, VIA NATURAL OR ARTIFICIAL OPENING ENDOSCOPIC: ICD-10-PCS | Performed by: OTOLARYNGOLOGY

## 2019-12-27 PROCEDURE — 09BQ8ZZ EXCISION OF RIGHT MAXILLARY SINUS, VIA NATURAL OR ARTIFICIAL OPENING ENDOSCOPIC: ICD-10-PCS | Performed by: OTOLARYNGOLOGY

## 2019-12-27 PROCEDURE — 31259 NSL/SINS NDSC SPHN TISS RMVL: CPT | Performed by: OTOLARYNGOLOGY

## 2019-12-27 PROCEDURE — 31276 NSL/SINS NDSC FRNT TISS RMVL: CPT | Performed by: OTOLARYNGOLOGY

## 2019-12-27 PROCEDURE — 8E09XBZ COMPUTER ASSISTED PROCEDURE OF HEAD AND NECK REGION: ICD-10-PCS | Performed by: OTOLARYNGOLOGY

## 2019-12-27 PROCEDURE — 30140 RESECT INFERIOR TURBINATE: CPT | Performed by: OTOLARYNGOLOGY

## 2019-12-27 PROCEDURE — 09TU8ZZ RESECTION OF RIGHT ETHMOID SINUS, VIA NATURAL OR ARTIFICIAL OPENING ENDOSCOPIC: ICD-10-PCS | Performed by: OTOLARYNGOLOGY

## 2019-12-27 PROCEDURE — 31267 ENDOSCOPY MAXILLARY SINUS: CPT | Performed by: OTOLARYNGOLOGY

## 2019-12-27 PROCEDURE — 09BS8ZZ EXCISION OF RIGHT FRONTAL SINUS, VIA NATURAL OR ARTIFICIAL OPENING ENDOSCOPIC: ICD-10-PCS | Performed by: OTOLARYNGOLOGY

## 2019-12-27 PROCEDURE — 09SL8ZZ REPOSITION NASAL TURBINATE, VIA NATURAL OR ARTIFICIAL OPENING ENDOSCOPIC: ICD-10-PCS | Performed by: OTOLARYNGOLOGY

## 2019-12-27 PROCEDURE — 09BX8ZZ EXCISION OF LEFT SPHENOID SINUS, VIA NATURAL OR ARTIFICIAL OPENING ENDOSCOPIC: ICD-10-PCS | Performed by: OTOLARYNGOLOGY

## 2019-12-27 PROCEDURE — 61782 SCAN PROC CRANIAL EXTRA: CPT | Performed by: OTOLARYNGOLOGY

## 2019-12-27 PROCEDURE — 09BW8ZZ EXCISION OF RIGHT SPHENOID SINUS, VIA NATURAL OR ARTIFICIAL OPENING ENDOSCOPIC: ICD-10-PCS | Performed by: OTOLARYNGOLOGY

## 2019-12-27 PROCEDURE — 09TL8ZZ RESECTION OF NASAL TURBINATE, VIA NATURAL OR ARTIFICIAL OPENING ENDOSCOPIC: ICD-10-PCS | Performed by: OTOLARYNGOLOGY

## 2019-12-27 PROCEDURE — 09TV8ZZ RESECTION OF LEFT ETHMOID SINUS, VIA NATURAL OR ARTIFICIAL OPENING ENDOSCOPIC: ICD-10-PCS | Performed by: OTOLARYNGOLOGY

## 2019-12-27 PROCEDURE — 09BT8ZZ EXCISION OF LEFT FRONTAL SINUS, VIA NATURAL OR ARTIFICIAL OPENING ENDOSCOPIC: ICD-10-PCS | Performed by: OTOLARYNGOLOGY

## 2019-12-27 DEVICE — PROPEL SINUS IMPLANT
Type: IMPLANTABLE DEVICE | Site: NOSE | Status: FUNCTIONAL
Brand: PROPEL

## 2019-12-27 RX ORDER — ONDANSETRON 2 MG/ML
4 INJECTION INTRAMUSCULAR; INTRAVENOUS ONCE AS NEEDED
Status: DISCONTINUED | OUTPATIENT
Start: 2019-12-27 | End: 2019-12-27

## 2019-12-27 RX ORDER — DIAPER,BRIEF,INFANT-TODD,DISP
EACH MISCELLANEOUS AS NEEDED
Status: DISCONTINUED | OUTPATIENT
Start: 2019-12-27 | End: 2019-12-27 | Stop reason: HOSPADM

## 2019-12-27 RX ORDER — GLYCOPYRROLATE 0.2 MG/ML
INJECTION INTRAMUSCULAR; INTRAVENOUS AS NEEDED
Status: DISCONTINUED | OUTPATIENT
Start: 2019-12-27 | End: 2019-12-27 | Stop reason: SURG

## 2019-12-27 RX ORDER — MORPHINE SULFATE 4 MG/ML
4 INJECTION, SOLUTION INTRAMUSCULAR; INTRAVENOUS EVERY 10 MIN PRN
Status: DISCONTINUED | OUTPATIENT
Start: 2019-12-27 | End: 2019-12-27

## 2019-12-27 RX ORDER — MORPHINE SULFATE 4 MG/ML
2 INJECTION, SOLUTION INTRAMUSCULAR; INTRAVENOUS EVERY 10 MIN PRN
Status: DISCONTINUED | OUTPATIENT
Start: 2019-12-27 | End: 2019-12-27

## 2019-12-27 RX ORDER — EPHEDRINE SULFATE 50 MG/ML
INJECTION, SOLUTION INTRAVENOUS AS NEEDED
Status: DISCONTINUED | OUTPATIENT
Start: 2019-12-27 | End: 2019-12-27 | Stop reason: SURG

## 2019-12-27 RX ORDER — ONDANSETRON 2 MG/ML
INJECTION INTRAMUSCULAR; INTRAVENOUS AS NEEDED
Status: DISCONTINUED | OUTPATIENT
Start: 2019-12-27 | End: 2019-12-27 | Stop reason: SURG

## 2019-12-27 RX ORDER — ACETAMINOPHEN 500 MG
1000 TABLET ORAL ONCE
Status: COMPLETED | OUTPATIENT
Start: 2019-12-27 | End: 2019-12-27

## 2019-12-27 RX ORDER — MORPHINE SULFATE 10 MG/ML
6 INJECTION, SOLUTION INTRAMUSCULAR; INTRAVENOUS EVERY 10 MIN PRN
Status: DISCONTINUED | OUTPATIENT
Start: 2019-12-27 | End: 2019-12-27

## 2019-12-27 RX ORDER — ROCURONIUM BROMIDE 10 MG/ML
INJECTION, SOLUTION INTRAVENOUS AS NEEDED
Status: DISCONTINUED | OUTPATIENT
Start: 2019-12-27 | End: 2019-12-27 | Stop reason: SURG

## 2019-12-27 RX ORDER — SODIUM CHLORIDE, SODIUM LACTATE, POTASSIUM CHLORIDE, CALCIUM CHLORIDE 600; 310; 30; 20 MG/100ML; MG/100ML; MG/100ML; MG/100ML
INJECTION, SOLUTION INTRAVENOUS CONTINUOUS
Status: DISCONTINUED | OUTPATIENT
Start: 2019-12-27 | End: 2019-12-27

## 2019-12-27 RX ORDER — LIDOCAINE HYDROCHLORIDE AND EPINEPHRINE 10; 10 MG/ML; UG/ML
INJECTION, SOLUTION INFILTRATION; PERINEURAL AS NEEDED
Status: DISCONTINUED | OUTPATIENT
Start: 2019-12-27 | End: 2019-12-27 | Stop reason: HOSPADM

## 2019-12-27 RX ORDER — METOCLOPRAMIDE 10 MG/1
10 TABLET ORAL ONCE
Status: COMPLETED | OUTPATIENT
Start: 2019-12-27 | End: 2019-12-27

## 2019-12-27 RX ORDER — DEXAMETHASONE SODIUM PHOSPHATE 4 MG/ML
VIAL (ML) INJECTION AS NEEDED
Status: DISCONTINUED | OUTPATIENT
Start: 2019-12-27 | End: 2019-12-27 | Stop reason: SURG

## 2019-12-27 RX ORDER — HYDROCODONE BITARTRATE AND ACETAMINOPHEN 5; 325 MG/1; MG/1
2 TABLET ORAL AS NEEDED
Status: DISCONTINUED | OUTPATIENT
Start: 2019-12-27 | End: 2019-12-27

## 2019-12-27 RX ORDER — HYDROMORPHONE HYDROCHLORIDE 1 MG/ML
0.2 INJECTION, SOLUTION INTRAMUSCULAR; INTRAVENOUS; SUBCUTANEOUS EVERY 5 MIN PRN
Status: DISCONTINUED | OUTPATIENT
Start: 2019-12-27 | End: 2019-12-27

## 2019-12-27 RX ORDER — PROCHLORPERAZINE EDISYLATE 5 MG/ML
5 INJECTION INTRAMUSCULAR; INTRAVENOUS ONCE AS NEEDED
Status: DISCONTINUED | OUTPATIENT
Start: 2019-12-27 | End: 2019-12-27

## 2019-12-27 RX ORDER — MIDAZOLAM HYDROCHLORIDE 1 MG/ML
INJECTION INTRAMUSCULAR; INTRAVENOUS AS NEEDED
Status: DISCONTINUED | OUTPATIENT
Start: 2019-12-27 | End: 2019-12-27 | Stop reason: SURG

## 2019-12-27 RX ORDER — LIDOCAINE HYDROCHLORIDE 10 MG/ML
INJECTION, SOLUTION EPIDURAL; INFILTRATION; INTRACAUDAL; PERINEURAL AS NEEDED
Status: DISCONTINUED | OUTPATIENT
Start: 2019-12-27 | End: 2019-12-27 | Stop reason: SURG

## 2019-12-27 RX ORDER — HYDROMORPHONE HYDROCHLORIDE 1 MG/ML
0.4 INJECTION, SOLUTION INTRAMUSCULAR; INTRAVENOUS; SUBCUTANEOUS EVERY 5 MIN PRN
Status: DISCONTINUED | OUTPATIENT
Start: 2019-12-27 | End: 2019-12-27

## 2019-12-27 RX ORDER — NALOXONE HYDROCHLORIDE 0.4 MG/ML
80 INJECTION, SOLUTION INTRAMUSCULAR; INTRAVENOUS; SUBCUTANEOUS AS NEEDED
Status: DISCONTINUED | OUTPATIENT
Start: 2019-12-27 | End: 2019-12-27

## 2019-12-27 RX ORDER — HYDROMORPHONE HYDROCHLORIDE 1 MG/ML
0.4 INJECTION, SOLUTION INTRAMUSCULAR; INTRAVENOUS; SUBCUTANEOUS
Status: CANCELLED | OUTPATIENT
Start: 2019-12-27 | End: 2019-12-27

## 2019-12-27 RX ORDER — HYDROCODONE BITARTRATE AND ACETAMINOPHEN 5; 325 MG/1; MG/1
1 TABLET ORAL AS NEEDED
Status: DISCONTINUED | OUTPATIENT
Start: 2019-12-27 | End: 2019-12-27

## 2019-12-27 RX ORDER — LABETALOL HYDROCHLORIDE 5 MG/ML
INJECTION, SOLUTION INTRAVENOUS AS NEEDED
Status: DISCONTINUED | OUTPATIENT
Start: 2019-12-27 | End: 2019-12-27 | Stop reason: SURG

## 2019-12-27 RX ORDER — HYDROMORPHONE HYDROCHLORIDE 1 MG/ML
0.6 INJECTION, SOLUTION INTRAMUSCULAR; INTRAVENOUS; SUBCUTANEOUS EVERY 5 MIN PRN
Status: DISCONTINUED | OUTPATIENT
Start: 2019-12-27 | End: 2019-12-27

## 2019-12-27 RX ORDER — HALOPERIDOL 5 MG/ML
0.25 INJECTION INTRAMUSCULAR ONCE AS NEEDED
Status: DISCONTINUED | OUTPATIENT
Start: 2019-12-27 | End: 2019-12-27

## 2019-12-27 RX ORDER — FAMOTIDINE 20 MG/1
20 TABLET ORAL ONCE
Status: COMPLETED | OUTPATIENT
Start: 2019-12-27 | End: 2019-12-27

## 2019-12-27 RX ORDER — LABETALOL HYDROCHLORIDE 5 MG/ML
10 INJECTION, SOLUTION INTRAVENOUS EVERY 10 MIN PRN
Status: DISCONTINUED | OUTPATIENT
Start: 2019-12-27 | End: 2019-12-27

## 2019-12-27 RX ADMIN — LIDOCAINE HYDROCHLORIDE 50 MG: 10 INJECTION, SOLUTION EPIDURAL; INFILTRATION; INTRACAUDAL; PERINEURAL at 09:45:00

## 2019-12-27 RX ADMIN — MIDAZOLAM HYDROCHLORIDE 2 MG: 1 INJECTION INTRAMUSCULAR; INTRAVENOUS at 09:39:00

## 2019-12-27 RX ADMIN — ONDANSETRON 4 MG: 2 INJECTION INTRAMUSCULAR; INTRAVENOUS at 09:52:00

## 2019-12-27 RX ADMIN — SODIUM CHLORIDE, SODIUM LACTATE, POTASSIUM CHLORIDE, CALCIUM CHLORIDE: 600; 310; 30; 20 INJECTION, SOLUTION INTRAVENOUS at 10:27:00

## 2019-12-27 RX ADMIN — LABETALOL HYDROCHLORIDE 10 MG: 5 INJECTION, SOLUTION INTRAVENOUS at 10:13:00

## 2019-12-27 RX ADMIN — ROCURONIUM BROMIDE 5 MG: 10 INJECTION, SOLUTION INTRAVENOUS at 09:45:00

## 2019-12-27 RX ADMIN — SODIUM CHLORIDE, SODIUM LACTATE, POTASSIUM CHLORIDE, CALCIUM CHLORIDE: 600; 310; 30; 20 INJECTION, SOLUTION INTRAVENOUS at 09:39:00

## 2019-12-27 RX ADMIN — GLYCOPYRROLATE 0.1 MG: 0.2 INJECTION INTRAMUSCULAR; INTRAVENOUS at 09:45:00

## 2019-12-27 RX ADMIN — EPHEDRINE SULFATE 10 MG: 50 INJECTION, SOLUTION INTRAVENOUS at 10:20:00

## 2019-12-27 RX ADMIN — DEXAMETHASONE SODIUM PHOSPHATE 4 MG: 4 MG/ML VIAL (ML) INJECTION at 09:52:00

## 2019-12-27 NOTE — ANESTHESIA PREPROCEDURE EVALUATION
Anesthesia PreOp Note    HPI:     Sandrine Mcadams. is a 81year old male who presents for preoperative consultation requested by: Jesus Ortiz MD    Date of Surgery: 12/27/2019    Procedure(s):  NASAL SEPTOPLASTY BILAT SINUS ENDOSCOPY ETHM MAX  Indicat oral route for 3 days, then 1 tabs for 2 days then . 5 tabs for 2 days, Disp: 9 tablet, Rfl: 0      lactated ringers infusion, , Intravenous, Continuous, Bernard Sanchez MD, Last Rate: 20 mL/hr at 12/27/19 6208    No current [facility]-ordered outpatient medicatio and weight is 111.6 kg (246 lb). His oral temperature is 98.2 °F (36.8 °C). His blood pressure is 173/79 (abnormal) and his pulse is 65. His respiration is 16 and oxygen saturation is 98%.     12/20/19  1030 12/27/19  0759   BP:  (!) 173/79   Pulse:  65   R

## 2019-12-27 NOTE — ANESTHESIA PROCEDURE NOTES
Airway  Date/Time: 12/27/2019 9:48 AM  Urgency: Elective    Airway not difficult    General Information and Staff    Patient location during procedure: OR  Anesthesiologist: Ching Cassidy MD  Resident/CRNA: Marsha Cuello CRNA  Performed: CRNA

## 2019-12-27 NOTE — ANESTHESIA POSTPROCEDURE EVALUATION
Patient: Loida Haines.    Procedure Summary     Date:  12/27/19 Room / Location:  [facility] OR 01 / [facility] OR    Anesthesia Start:  9472 Anesthesia Stop:  9666    Procedure:  NASAL SEPTOPLASTY BILAT SINUS ENDOSCOPY ETHM MAX (Bilateral Nose) Diagnosis

## 2019-12-27 NOTE — INTERVAL H&P NOTE
Pre-op Diagnosis: Chronic ethmoidal sinusitis [J32.2]  Chronic sphenoidal sinusitis [J32.3]  Chronic maxillary sinusitis [J32.0]  Chronic frontal sinusitis [J32.1]  Nose polyp [J33.9]  Hypertrophy of nasal turbinates [J34.3]    The above referenced H&P was

## 2019-12-27 NOTE — TELEPHONE ENCOUNTER
Patient states was told to come back on Mon to see Dr. Lynda Graham because Dr. John Paul Sotelo will not be in office. Dec tree states should come back within 5 days which is Wednesday can you please advise ?

## 2019-12-27 NOTE — OPERATIVE REPORT
Trace Ledbetter. DATE OF SURGERY:    12/27/2019  PREOPERATIVE DIAGNOSIS:   Chronic nasal obstruction , chronic sinusitis, nasal polyposis  POSTOPERATIVE DIAGNOSIS: Same.   OPERATIVE PROCEDURE:   Medtronic assisted navigational sinus surgery, Bilateral t stab incision was created in the anterior aspect of the turbinate and several channels were created. The microdebrider is then turned on and submucosal resection was done.   At the completion of this procedure the turbinate was noted to be quite shrunken a site of the maxillary os. This occluded os was entered. The [employer] system was used to facilitate this. The soft tissue was removed with the microdebrider. The uncinate process was then taken down with the microdebrider.  The middle turbinate  Was very dif

## 2019-12-28 ENCOUNTER — TELEPHONE (OUTPATIENT)
Dept: OTOLARYNGOLOGY | Facility: CLINIC | Age: 69
End: 2019-12-28

## 2019-12-28 NOTE — TELEPHONE ENCOUNTER
Pt POD 1 sinus surgery, pt reports he is doing very well, pain is minimal. Denies fever, states bleeding is minimal and that he's changed mustache dressing only 4 times since surgery.  Understands to call office with fever >102 or saturating more than 4 mus

## 2019-12-30 ENCOUNTER — OFFICE VISIT (OUTPATIENT)
Dept: OTOLARYNGOLOGY | Facility: CLINIC | Age: 69
End: 2019-12-30
Payer: MEDICARE

## 2019-12-30 VITALS
DIASTOLIC BLOOD PRESSURE: 82 MMHG | HEIGHT: 72 IN | BODY MASS INDEX: 33.32 KG/M2 | SYSTOLIC BLOOD PRESSURE: 154 MMHG | TEMPERATURE: 98 F | WEIGHT: 246 LBS

## 2019-12-30 DIAGNOSIS — J33.9 NASAL POLYPS: Primary | ICD-10-CM

## 2019-12-30 PROCEDURE — 99024 POSTOP FOLLOW-UP VISIT: CPT | Performed by: OTOLARYNGOLOGY

## 2019-12-30 PROCEDURE — G0463 HOSPITAL OUTPT CLINIC VISIT: HCPCS | Performed by: OTOLARYNGOLOGY

## 2019-12-30 NOTE — PROGRESS NOTES
Sesar John. is a 81year old male.   Patient presents with:  Post-Op: medtronic, Endo maxillary w/tissue removal, endo total ethmoidectomy, endo sphenoid , endo frontal, endo nasal polyectomy, smr done on 12/27/19      HISTORY OF PRESENT ILLNESS  He Dyspnea and wheezing. Cardio Negative Chest pain, irregular heartbeat/palpitations and syncope. GI Negative Abdominal pain and diarrhea. Endocrine Negative Cold intolerance and heat intolerance. Neuro Negative Tremors.    Psych Negative Anxiety and Take 1 capsule (500 mg total) by mouth 3 (three) times daily for 7 days. , Disp: 21 capsule, Rfl: 0  •  predniSONE 20 MG Oral Tab, Take 2 tabs by oral route for 3 days, then 1 tabs for 2 days then . 5 tabs for 2 days, Disp: 9 tablet, Rfl: 0  •  predniSONE 20

## 2020-01-06 ENCOUNTER — OFFICE VISIT (OUTPATIENT)
Dept: OTOLARYNGOLOGY | Facility: CLINIC | Age: 70
End: 2020-01-06
Payer: MEDICARE

## 2020-01-06 VITALS
SYSTOLIC BLOOD PRESSURE: 108 MMHG | DIASTOLIC BLOOD PRESSURE: 60 MMHG | WEIGHT: 246 LBS | HEIGHT: 72 IN | BODY MASS INDEX: 33.32 KG/M2 | TEMPERATURE: 98 F

## 2020-01-06 DIAGNOSIS — J33.9 NASAL POLYPOSIS: Primary | ICD-10-CM

## 2020-01-06 PROCEDURE — 99024 POSTOP FOLLOW-UP VISIT: CPT | Performed by: OTOLARYNGOLOGY

## 2020-01-06 PROCEDURE — 31237 NSL/SINS NDSC SURG BX POLYPC: CPT | Performed by: OTOLARYNGOLOGY

## 2020-01-06 NOTE — PROGRESS NOTES
Fred Cohn. is a 81year old male. Patient presents with:  Post-Op: pt presents with 2nd post op sinus surgery done on 12/27/19      HISTORY OF PRESENT ILLNESS   1/3/2019    The patient presents with nasal obstruction for 4 months.   He noted that s Tobacco Use      Smoking status: Never Smoker      Smokeless tobacco: Never Used    Substance and Sexual Activity      Alcohol use: No      Drug use: Never      Family History   Problem Relation Age of Onset   • No Known Problems Father    • No Known Probl Normal Orientation - Oriented to time, place, person & situation. Appropriate mood and affect. Neck Exam Normal Inspection - Normal. Palpation - Normal without lymphadenopathy.  Parotid gland - Normal. Thyroid gland - Normal.   Eyes Normal Conjunctiva - R amLODIPine Besylate 10 MG Oral Tab, Take 1 tablet (10 mg total) by mouth daily. , Disp: 30 tablet, Rfl: 5  •  Fluticasone Propionate 50 MCG/ACT Nasal Suspension, 1 spray by Nasal route 2 (two) times daily. , Disp: 1 Bottle, Rfl: 3  •  HYDROcodone-acetaminoph

## 2020-01-13 ENCOUNTER — OFFICE VISIT (OUTPATIENT)
Dept: OTOLARYNGOLOGY | Facility: CLINIC | Age: 70
End: 2020-01-13
Payer: MEDICARE

## 2020-01-13 VITALS
BODY MASS INDEX: 33.32 KG/M2 | SYSTOLIC BLOOD PRESSURE: 131 MMHG | HEIGHT: 72 IN | WEIGHT: 246 LBS | TEMPERATURE: 97 F | DIASTOLIC BLOOD PRESSURE: 74 MMHG

## 2020-01-13 DIAGNOSIS — J32.9 CHRONIC SINUSITIS, UNSPECIFIED LOCATION: Primary | ICD-10-CM

## 2020-01-13 PROCEDURE — 99213 OFFICE O/P EST LOW 20 MIN: CPT | Performed by: OTOLARYNGOLOGY

## 2020-01-13 PROCEDURE — G0463 HOSPITAL OUTPT CLINIC VISIT: HCPCS | Performed by: OTOLARYNGOLOGY

## 2020-01-13 RX ORDER — FLUTICASONE PROPIONATE 50 MCG
1 SPRAY, SUSPENSION (ML) NASAL 2 TIMES DAILY
Qty: 1 BOTTLE | Refills: 11 | Status: SHIPPED | OUTPATIENT
Start: 2020-01-13

## 2020-01-13 NOTE — PROGRESS NOTES
Lisa Villafana. is a 81year old male. Patient presents with: Follow - Up: s/p sinus surgery done on 12/27/19      HISTORY OF PRESENT ILLNESS   1/3/2019    The patient presents with nasal obstruction for 4 months.   He noted that started in August and children: Not on file      Years of education: Not on file      Highest education level: Not on file    Tobacco Use      Smoking status: Never Smoker      Smokeless tobacco: Never Used    Substance and Sexual Activity      Alcohol use: No      Drug use: Ne appearance - Normal.   Psychiatric Normal Orientation - Oriented to time, place, person & situation. Appropriate mood and affect. Neck Exam Normal Inspection - Normal. Palpation - Normal without lymphadenopathy.  Parotid gland - Normal. Thyroid gland - No answered questions regarding nasal polyposis there is a fairly high rate of recurrence given his extensive degree of polyposis.   The way we manage this is by starting back on the intranasal Flonase today continuing the saline irrigations    I recommended t

## 2020-02-21 ENCOUNTER — OFFICE VISIT (OUTPATIENT)
Dept: INTERNAL MEDICINE CLINIC | Facility: CLINIC | Age: 70
End: 2020-02-21
Payer: MEDICARE

## 2020-02-21 VITALS
DIASTOLIC BLOOD PRESSURE: 60 MMHG | OXYGEN SATURATION: 99 % | HEIGHT: 72 IN | WEIGHT: 240 LBS | HEART RATE: 78 BPM | TEMPERATURE: 98 F | SYSTOLIC BLOOD PRESSURE: 120 MMHG | BODY MASS INDEX: 32.51 KG/M2

## 2020-02-21 DIAGNOSIS — M20.60 DEFORMITY OF TOE, UNSPECIFIED LATERALITY: ICD-10-CM

## 2020-02-21 DIAGNOSIS — I87.2 VENOUS INSUFFICIENCY OF BOTH LOWER EXTREMITIES: ICD-10-CM

## 2020-02-21 DIAGNOSIS — I10 BENIGN HYPERTENSION: Primary | ICD-10-CM

## 2020-02-21 DIAGNOSIS — J33.9 NASAL POLYPOSIS: ICD-10-CM

## 2020-02-21 PROCEDURE — G0463 HOSPITAL OUTPT CLINIC VISIT: HCPCS | Performed by: INTERNAL MEDICINE

## 2020-02-21 PROCEDURE — 99214 OFFICE O/P EST MOD 30 MIN: CPT | Performed by: INTERNAL MEDICINE

## 2020-02-21 RX ORDER — AMLODIPINE BESYLATE 10 MG/1
10 TABLET ORAL DAILY
Qty: 30 TABLET | Refills: 5 | Status: SHIPPED | OUTPATIENT
Start: 2020-02-21 | End: 2020-08-21

## 2020-02-21 NOTE — PROGRESS NOTES
Ole Ocasio. is a 81year old male.     HPI:   Patient presents with:  Checkup: 2 month       80 y/o M with HTN here for F/U; on amlodipine 10 mg po qD;  no CP; no SOB; no headaches; no palpitation; saw PV surgery Dr Marlo Hernandez at [facility] Degree in F loss; no fatigue  Cardiovascular:  Negative for chest pain; negative palpitations  Respiratory:  Negative for cough, dyspnea and wheezing  Gastrointestinal:  Negative for abdominal pain, constipation, decreased appetite, diarrhea and vomiting; no melena or cancer screening        RTC 6 months         Orders This Visit:  No orders of the defined types were placed in this encounter.       Meds This Visit:  Requested Prescriptions     Signed Prescriptions Disp Refills   • amLODIPine Besylate 10 MG Oral Tab 30 ta

## 2020-03-16 RX ORDER — AMLODIPINE BESYLATE 10 MG/1
TABLET ORAL
Qty: 30 TABLET | Refills: 4 | OUTPATIENT
Start: 2020-03-16

## 2020-03-16 NOTE — TELEPHONE ENCOUNTER
Current refill request refused due to refill is either a duplicate request or has active refills at the pharmacy. Check previous templates.     Requested Prescriptions     Refused Prescriptions Disp Refills   • AMLODIPINE BESYLATE 10 MG Oral Tab [Pharmacy

## 2020-06-10 ENCOUNTER — OFFICE VISIT (OUTPATIENT)
Dept: INTERNAL MEDICINE CLINIC | Facility: CLINIC | Age: 70
End: 2020-06-10
Payer: MEDICARE

## 2020-06-10 VITALS
DIASTOLIC BLOOD PRESSURE: 60 MMHG | WEIGHT: 236.63 LBS | HEART RATE: 80 BPM | BODY MASS INDEX: 32.05 KG/M2 | SYSTOLIC BLOOD PRESSURE: 110 MMHG | TEMPERATURE: 99 F | OXYGEN SATURATION: 98 % | HEIGHT: 72 IN

## 2020-06-10 DIAGNOSIS — L23.9 ALLERGIC CONTACT DERMATITIS, UNSPECIFIED TRIGGER: Primary | ICD-10-CM

## 2020-06-10 DIAGNOSIS — L85.3 XEROSIS OF SKIN: ICD-10-CM

## 2020-06-10 DIAGNOSIS — I10 BENIGN HYPERTENSION: ICD-10-CM

## 2020-06-10 DIAGNOSIS — B35.9 TINEA: ICD-10-CM

## 2020-06-10 PROCEDURE — G0009 ADMIN PNEUMOCOCCAL VACCINE: HCPCS | Performed by: INTERNAL MEDICINE

## 2020-06-10 PROCEDURE — 90732 PPSV23 VACC 2 YRS+ SUBQ/IM: CPT | Performed by: INTERNAL MEDICINE

## 2020-06-10 PROCEDURE — 99214 OFFICE O/P EST MOD 30 MIN: CPT | Performed by: INTERNAL MEDICINE

## 2020-06-10 PROCEDURE — G0463 HOSPITAL OUTPT CLINIC VISIT: HCPCS | Performed by: INTERNAL MEDICINE

## 2020-06-10 RX ORDER — CLOTRIMAZOLE 1 %
CREAM (GRAM) TOPICAL
Qty: 60 G | Refills: 2 | Status: SHIPPED | OUTPATIENT
Start: 2020-06-10

## 2020-06-10 RX ORDER — AMMONIUM LACTATE 12 G/100G
CREAM TOPICAL
Qty: 385 G | Refills: 1 | Status: SHIPPED | OUTPATIENT
Start: 2020-06-10

## 2020-06-10 RX ORDER — WATER / MINERAL OIL / WHITE PETROLATUM 16 OZ
CREAM TOPICAL
Qty: 240 G | Refills: 2 | Status: SHIPPED | OUTPATIENT
Start: 2020-06-10

## 2020-06-10 NOTE — PROGRESS NOTES
Negar Hayes. is a 78year old male. HPI:   Patient presents with:   Allergic Rxn Allergies: got Tide detergent dropped into bedsheets - worst reaction on feet      77 y/o M who had been using Tide laundry detergent from Jan 2019 to March 2020; pt r 385 g 1   • amLODIPine Besylate 10 MG Oral Tab Take 1 tablet (10 mg total) by mouth daily. 30 tablet 5   • Fluticasone Propionate 50 MCG/ACT Nasal Suspension 1 spray by Nasal route 2 (two) times daily.  1 Bottle 11       Allergies:    Soap II-XII intact; light touch and proprioception intact  Skin: +dry, flaking skin to lower half of pretibial BLE with +whitish fine plaque to dorsum of feet         ASSESSMENT/PLAN:   Contact dermatitis with +xerosis and +tinea infection  Pt avoiding contact Sig:  ATAA legs BID       Imaging & Referrals:  PNEUMOCOCCAL IMM (PNEUMOVAX)     6/10/2020  Rosa Hall MD

## 2020-07-13 ENCOUNTER — OFFICE VISIT (OUTPATIENT)
Dept: OTOLARYNGOLOGY | Facility: CLINIC | Age: 70
End: 2020-07-13
Payer: MEDICARE

## 2020-07-13 VITALS
TEMPERATURE: 98 F | SYSTOLIC BLOOD PRESSURE: 120 MMHG | DIASTOLIC BLOOD PRESSURE: 82 MMHG | HEIGHT: 73 IN | BODY MASS INDEX: 30.48 KG/M2 | WEIGHT: 230 LBS

## 2020-07-13 DIAGNOSIS — J32.9 CHRONIC SINUSITIS, UNSPECIFIED LOCATION: ICD-10-CM

## 2020-07-13 DIAGNOSIS — J33.9 NASAL POLYPS: Primary | ICD-10-CM

## 2020-07-13 PROCEDURE — 99213 OFFICE O/P EST LOW 20 MIN: CPT | Performed by: OTOLARYNGOLOGY

## 2020-07-13 PROCEDURE — G0463 HOSPITAL OUTPT CLINIC VISIT: HCPCS | Performed by: OTOLARYNGOLOGY

## 2020-07-13 NOTE — PROGRESS NOTES
Vi Duran is a 78year old male. Patient presents with:   Follow - Up: pt presents with a Follow - Up: s/p sinus surgery done on 12/27/19, per pt feeling good       HISTORY OF PRESENT ILLNESS   1/3/2019    The patient presents with nasal obstructi no congestion no drainage facial pressure and breathes well through his nose he is currently still on the Flonase  Social History    Socioeconomic History      Marital status: Single      Spouse name: Not on file      Number of children: Not on file      Y Hema/Lymph Negative Easy bleeding and easy bruising.            PHYSICAL EXAM    /82 (BP Location: Left arm, Patient Position: Sitting, Cuff Size: large)   Temp 98.1 °F (36.7 °C) (Tympanic)   Ht 6' 1\" (1.854 m)   Wt 230 lb (104.3 kg)   BMI 30.34 kg endoscopic sinus surgery and I see no evidence whatsoever of polyp recurrence. I encouraged him to stay on the Flonase indefinitely and follow-up on an as-needed basis.             Ulises Rodriguez MD

## 2020-08-21 ENCOUNTER — OFFICE VISIT (OUTPATIENT)
Dept: INTERNAL MEDICINE CLINIC | Facility: CLINIC | Age: 70
End: 2020-08-21
Payer: MEDICARE

## 2020-08-21 VITALS
BODY MASS INDEX: 31.14 KG/M2 | DIASTOLIC BLOOD PRESSURE: 68 MMHG | TEMPERATURE: 98 F | OXYGEN SATURATION: 98 % | HEIGHT: 73 IN | SYSTOLIC BLOOD PRESSURE: 128 MMHG | HEART RATE: 68 BPM | WEIGHT: 235 LBS | RESPIRATION RATE: 18 BRPM

## 2020-08-21 DIAGNOSIS — J33.9 NASAL POLYPOSIS: ICD-10-CM

## 2020-08-21 DIAGNOSIS — I87.2 VENOUS INSUFFICIENCY OF BOTH LOWER EXTREMITIES: ICD-10-CM

## 2020-08-21 DIAGNOSIS — L23.9 ALLERGIC CONTACT DERMATITIS, UNSPECIFIED TRIGGER: Primary | ICD-10-CM

## 2020-08-21 DIAGNOSIS — Z12.5 SCREENING PSA (PROSTATE SPECIFIC ANTIGEN): ICD-10-CM

## 2020-08-21 DIAGNOSIS — I10 BENIGN HYPERTENSION: ICD-10-CM

## 2020-08-21 DIAGNOSIS — L85.3 XEROSIS OF SKIN: ICD-10-CM

## 2020-08-21 PROCEDURE — 99214 OFFICE O/P EST MOD 30 MIN: CPT | Performed by: INTERNAL MEDICINE

## 2020-08-21 PROCEDURE — G0463 HOSPITAL OUTPT CLINIC VISIT: HCPCS | Performed by: INTERNAL MEDICINE

## 2020-08-21 RX ORDER — AMLODIPINE BESYLATE 10 MG/1
10 TABLET ORAL DAILY
Qty: 30 TABLET | Refills: 5 | Status: SHIPPED | OUTPATIENT
Start: 2020-08-21 | End: 2020-10-23

## 2020-08-21 NOTE — PROGRESS NOTES
Claudene Constant. is a 78year old male.     HPI:   Patient presents with:  Checkup: 11 month      77 y/o M with HTN here for F/U; on amlodipine 10 mg po qD;  no CP; no SOB; no headaches; no palpitation; dermatitis to legs is much improved with Eucerin and Allergies:    Soap                    RASH, OTHER (SEE COMMENTS)    Comment:Tide no dye     Skin reaction, also itching              ROS:   Constitutional: no weight loss; no fatigue  Cardiovascular:  Negative for chest pain; negative palpitations  R venous doppler study for venous insufficiency on 1/24/19 at [facility]; saw [address] surgery 11/26/19 and saw BENJAMIN at [facility]; was advised for LE compression stockings; saw PV surgery Dr Slade Machado at [facility] Feb 2020; was advised to wear [employer] LE stockings

## 2020-09-28 ENCOUNTER — LAB ENCOUNTER (OUTPATIENT)
Dept: LAB | Age: 70
End: 2020-09-28
Attending: INTERNAL MEDICINE
Payer: MEDICARE

## 2020-09-28 DIAGNOSIS — I10 BENIGN HYPERTENSION: ICD-10-CM

## 2020-09-28 DIAGNOSIS — Z12.5 SCREENING PSA (PROSTATE SPECIFIC ANTIGEN): ICD-10-CM

## 2020-09-28 PROCEDURE — 80053 COMPREHEN METABOLIC PANEL: CPT

## 2020-09-28 PROCEDURE — 85025 COMPLETE CBC W/AUTO DIFF WBC: CPT

## 2020-09-28 PROCEDURE — 36415 COLL VENOUS BLD VENIPUNCTURE: CPT

## 2020-09-28 PROCEDURE — 84443 ASSAY THYROID STIM HORMONE: CPT

## 2020-09-28 PROCEDURE — 80061 LIPID PANEL: CPT

## 2020-10-23 ENCOUNTER — OFFICE VISIT (OUTPATIENT)
Dept: INTERNAL MEDICINE CLINIC | Facility: CLINIC | Age: 70
End: 2020-10-23
Payer: MEDICARE

## 2020-10-23 VITALS
WEIGHT: 232 LBS | TEMPERATURE: 98 F | SYSTOLIC BLOOD PRESSURE: 130 MMHG | HEART RATE: 73 BPM | BODY MASS INDEX: 30.75 KG/M2 | DIASTOLIC BLOOD PRESSURE: 72 MMHG | HEIGHT: 73 IN | OXYGEN SATURATION: 99 %

## 2020-10-23 DIAGNOSIS — E78.1 HYPERTRIGLYCERIDEMIA: ICD-10-CM

## 2020-10-23 DIAGNOSIS — M20.60 DEFORMITY OF TOE, UNSPECIFIED LATERALITY: ICD-10-CM

## 2020-10-23 DIAGNOSIS — Z12.5 SCREENING PSA (PROSTATE SPECIFIC ANTIGEN): ICD-10-CM

## 2020-10-23 DIAGNOSIS — L23.9 ALLERGIC CONTACT DERMATITIS, UNSPECIFIED TRIGGER: Primary | ICD-10-CM

## 2020-10-23 DIAGNOSIS — I87.2 VENOUS INSUFFICIENCY OF BOTH LOWER EXTREMITIES: ICD-10-CM

## 2020-10-23 DIAGNOSIS — I10 BENIGN HYPERTENSION: ICD-10-CM

## 2020-10-23 DIAGNOSIS — J33.9 NASAL POLYPOSIS: ICD-10-CM

## 2020-10-23 PROCEDURE — 99214 OFFICE O/P EST MOD 30 MIN: CPT | Performed by: INTERNAL MEDICINE

## 2020-10-23 PROCEDURE — G0463 HOSPITAL OUTPT CLINIC VISIT: HCPCS | Performed by: INTERNAL MEDICINE

## 2020-10-23 RX ORDER — AMLODIPINE BESYLATE 10 MG/1
10 TABLET ORAL DAILY
Qty: 90 TABLET | Refills: 3 | Status: SHIPPED | OUTPATIENT
Start: 2020-10-23 | End: 2021-10-04

## 2020-10-23 NOTE — PROGRESS NOTES
Ole Ocasio. is a 78year old male. HPI:   Patient presents with:  Checkup: Dermatitis improving. Upcoming appt with [place] Surgeon. Needs to be better with Compression Stockings.       79 y/o M with HTN here for F/U; on amlodipine 10 mg po qD;  n Ammonium Lactate (LAC-HYDRIN) 12 % External Cream ATAA legs  g 1   • Fluticasone Propionate 50 MCG/ACT Nasal Suspension 1 spray by Nasal route 2 (two) times daily.  1 Bottle 11       Allergies:    Soap                    RASH, OTHER (SEE COMMENTS) bilateral inferior turbinate outfracture with submucosal resection, placement of intranasal propel implants on 12/27/19 per ENT Dr Jeronimo Lipscomb     Toe deformity  F/U podiatrist Dr Jose Luis Valero at [facility]     Varicose veins  Noted to BLE; no ulcerations; had venous dopp

## 2021-03-12 DIAGNOSIS — Z23 NEED FOR VACCINATION: ICD-10-CM

## 2021-03-19 ENCOUNTER — OFFICE VISIT (OUTPATIENT)
Dept: AUDIOLOGY | Facility: CLINIC | Age: 71
End: 2021-03-19
Payer: MEDICARE

## 2021-03-19 ENCOUNTER — OFFICE VISIT (OUTPATIENT)
Dept: OTOLARYNGOLOGY | Facility: CLINIC | Age: 71
End: 2021-03-19
Payer: MEDICARE

## 2021-03-19 VITALS
DIASTOLIC BLOOD PRESSURE: 73 MMHG | SYSTOLIC BLOOD PRESSURE: 139 MMHG | TEMPERATURE: 98 F | WEIGHT: 230 LBS | HEIGHT: 73 IN | BODY MASS INDEX: 30.48 KG/M2

## 2021-03-19 DIAGNOSIS — H93.12 TINNITUS OF LEFT EAR: Primary | ICD-10-CM

## 2021-03-19 DIAGNOSIS — J01.40 SUBACUTE PANSINUSITIS: ICD-10-CM

## 2021-03-19 DIAGNOSIS — H93.A2 PULSATILE TINNITUS OF LEFT EAR: Primary | ICD-10-CM

## 2021-03-19 PROCEDURE — 92567 TYMPANOMETRY: CPT | Performed by: AUDIOLOGIST

## 2021-03-19 PROCEDURE — 92557 COMPREHENSIVE HEARING TEST: CPT | Performed by: AUDIOLOGIST

## 2021-03-19 PROCEDURE — 99214 OFFICE O/P EST MOD 30 MIN: CPT | Performed by: OTOLARYNGOLOGY

## 2021-03-19 NOTE — PROGRESS NOTES
Enedina Mauro. is a 78year old male. Patient presents with:  Ear Problem: left ear pounding sensation 2 days ago.  also rosa ear wax, needs hearing test      HISTORY OF PRESENT ILLNESS   1/3/2019    The patient presents with nasal obstruction for 4 mon congestion no drainage facial pressure and breathes well through his nose he is currently still on the Flonase  3/19/2021  1 week history of pulsatile tinnitus in the left ear he gets it when he lies flat on his left side wonders if his sinuses are okay he Toe deformity     since 1988       Past Surgical History:   Procedure Laterality Date   • EXCISION TURBINATE,SUBMUCOUS  12/27/2019   • EYE SURGERY     • HERNIA SURGERY     • NASAL SCOPE,BX/RMV POLYP/DEBRID  12/27/2019   • NASAL SCOPY,EXPLOR FRONTAL SINUS Skull - Normal.             Ears Normal Inspection - Right: Normal, Left: Normal. Canal - Right: Normal, Left: Normal. TM - Right: Normal, Left: Normal.  Symmetric sensorineural hearing loss fairly significant as noted discrimination symmetric and tympanog

## 2021-03-19 NOTE — PROGRESS NOTES
AUDIOLOGY REPORT      Mack Tobias. is a 78year old male     Referring Provider: Luis A Velasquez   YOB: 1950  Medical Record: [id number]      Patient Hearing History:  Patient seen through Dr. Gunnar Valadez clinic for hearing testing.   He is e

## 2021-03-25 ENCOUNTER — TELEPHONE (OUTPATIENT)
Dept: OTOLARYNGOLOGY | Facility: CLINIC | Age: 71
End: 2021-03-25

## 2021-03-31 ENCOUNTER — OFFICE VISIT (OUTPATIENT)
Dept: INTERNAL MEDICINE CLINIC | Facility: CLINIC | Age: 71
End: 2021-03-31
Payer: MEDICARE

## 2021-03-31 VITALS
DIASTOLIC BLOOD PRESSURE: 72 MMHG | BODY MASS INDEX: 33.08 KG/M2 | WEIGHT: 249.63 LBS | HEART RATE: 71 BPM | HEIGHT: 73 IN | OXYGEN SATURATION: 98 % | SYSTOLIC BLOOD PRESSURE: 132 MMHG | TEMPERATURE: 98 F

## 2021-03-31 DIAGNOSIS — J33.9 NASAL POLYPOSIS: ICD-10-CM

## 2021-03-31 DIAGNOSIS — L23.9 ALLERGIC CONTACT DERMATITIS, UNSPECIFIED TRIGGER: ICD-10-CM

## 2021-03-31 DIAGNOSIS — I83.93 VARICOSE VEINS OF BOTH LOWER EXTREMITIES, UNSPECIFIED WHETHER COMPLICATED: ICD-10-CM

## 2021-03-31 DIAGNOSIS — I10 BENIGN HYPERTENSION: ICD-10-CM

## 2021-03-31 DIAGNOSIS — H93.12 TINNITUS, LEFT EAR: Primary | ICD-10-CM

## 2021-03-31 PROCEDURE — 99214 OFFICE O/P EST MOD 30 MIN: CPT | Performed by: INTERNAL MEDICINE

## 2021-03-31 NOTE — PROGRESS NOTES
Jairon Burnett. is a 78year old male.     HPI:   Patient presents with:  Checkup      77 y/o M who reports he received first COVID vaccine on 3/15/21; he experienced subsequent tinnitus; he was seen by ENT Dr Laly Davila; EACs cleaned; he was noted to have b Protectants, Misc. (EUCERIN) External Cream Apply to legs 2x per day 240 g 2   • Ammonium Lactate (LAC-HYDRIN) 12 % External Cream ATAA legs  g 1   • Fluticasone Propionate 50 MCG/ACT Nasal Suspension 1 spray by Nasal route 2 (two) times daily.  [address] 2019 showed NSR without ischemic abnormality;     Nasal polyposis  taking Flonase NS 1 sp EN BID  S/p Medtronic assisted navigational sinus surgery, Bilateral transnasal endoscopic: total ethmoidectomies, sphenoidotomies, frontal sinusotomies, maxillary an

## 2021-04-13 NOTE — H&P
[address] [facility] [address] Gastroenterology                                                                                                  Clinic History and Physical     Pa EXCISION TURBINATE,SUBMUCOUS  12/27/2019   • EYE SURGERY     • HERNIA SURGERY     • NASAL SCOPE,BX/RMV POLYP/DEBRID  12/27/2019   • NASAL SCOPY,EXPLOR FRONTAL SINUS  12/27/2019   • NASAL SCOPY,REMV PART ETHMOID  12/27/2019   • NASAL SCOPY,REMV TISS Wallene Burkinan 6' 1\" (1.854 m), weight 250 lb (113.4 kg).     Gen: patient appears comfortable and in no acute distress  HEENT: conjunctiva pink, the sclera appears anicteric, oropharynx clear, mucus membranes appear moist  CV: regular rate and rhythm, the extremities ar Recommend:  -Schedule colonoscopy w/ Dr. Vipin Osuna with MAC or Dr. Gillian Alvarez with IV twilight or MAC  -Eligible for NE: Yes  -Prep: Split dose MiraLAX/Gatorade  -Anti-platelets and anti-coagulants: None  -Diabetes meds: None    ** If MAC @ [facility]/NE:

## 2021-04-27 ENCOUNTER — TELEPHONE (OUTPATIENT)
Dept: GASTROENTEROLOGY | Facility: CLINIC | Age: 71
End: 2021-04-27

## 2021-04-27 ENCOUNTER — OFFICE VISIT (OUTPATIENT)
Dept: GASTROENTEROLOGY | Facility: CLINIC | Age: 71
End: 2021-04-27
Payer: MEDICARE

## 2021-04-27 VITALS
DIASTOLIC BLOOD PRESSURE: 64 MMHG | WEIGHT: 250 LBS | HEIGHT: 73 IN | BODY MASS INDEX: 33.13 KG/M2 | SYSTOLIC BLOOD PRESSURE: 135 MMHG | HEART RATE: 79 BPM

## 2021-04-27 DIAGNOSIS — Z01.818 PRE-OP EVALUATION: ICD-10-CM

## 2021-04-27 DIAGNOSIS — Z80.0 FAMILY HISTORY OF COLON CANCER: ICD-10-CM

## 2021-04-27 DIAGNOSIS — Z12.11 SCREENING FOR COLON CANCER: Primary | ICD-10-CM

## 2021-04-27 DIAGNOSIS — I10 HYPERTENSION, UNSPECIFIED TYPE: ICD-10-CM

## 2021-04-27 PROCEDURE — 99203 OFFICE O/P NEW LOW 30 MIN: CPT | Performed by: NURSE PRACTITIONER

## 2021-04-27 NOTE — PATIENT INSTRUCTIONS
-Schedule colonoscopy w/ Dr. Onelia Perrin with MAC or Dr. Radha Jacques with IV twilight or MAC  Dx: screening   -Eligible for NE: Yes  -Prep: Split dose MiraLAX/Gatorade  -Anti-platelets and anti-coagulants: None  -Diabetes meds: None    ** If MAC @ [facility]/NE:    -

## 2021-04-27 NOTE — TELEPHONE ENCOUNTER
Scheduled for:  Colonoscopy 93339  Provider Name:  Dr Carloz Moe  Date:  08/10/2021  Location:  [facility]  Sedation:  MAC  Time:  10:30am (pt is aware to arrive at 9:30am)  Prep:  Miralax+Gatorade  Meds/Allergies Reconciled?:  Physician reviewed  Diagnosis wit

## 2021-08-10 ENCOUNTER — HOSPITAL ENCOUNTER (OUTPATIENT)
Facility: HOSPITAL | Age: 71
Setting detail: HOSPITAL OUTPATIENT SURGERY
Discharge: HOME | End: 2021-08-10
Attending: INTERNAL MEDICINE | Admitting: INTERNAL MEDICINE
Payer: MEDICARE

## 2021-08-10 ENCOUNTER — ANESTHESIA (OUTPATIENT)
Dept: ENDOSCOPY | Facility: HOSPITAL | Age: 71
End: 2021-08-10
Payer: MEDICARE

## 2021-08-10 ENCOUNTER — ANESTHESIA EVENT (OUTPATIENT)
Dept: ENDOSCOPY | Facility: HOSPITAL | Age: 71
End: 2021-08-10
Payer: MEDICARE

## 2021-08-10 VITALS
HEART RATE: 47 BPM | SYSTOLIC BLOOD PRESSURE: 105 MMHG | WEIGHT: 161 LBS | BODY MASS INDEX: 21.34 KG/M2 | RESPIRATION RATE: 16 BRPM | DIASTOLIC BLOOD PRESSURE: 62 MMHG | OXYGEN SATURATION: 100 % | HEIGHT: 73 IN

## 2021-08-10 DIAGNOSIS — Z12.11 SCREENING FOR COLON CANCER: ICD-10-CM

## 2021-08-10 PROCEDURE — 45385 COLONOSCOPY W/LESION REMOVAL: CPT | Performed by: INTERNAL MEDICINE

## 2021-08-10 PROCEDURE — 0DBN8ZX EXCISION OF SIGMOID COLON, VIA NATURAL OR ARTIFICIAL OPENING ENDOSCOPIC, DIAGNOSTIC: ICD-10-PCS | Performed by: INTERNAL MEDICINE

## 2021-08-10 PROCEDURE — 0DBL8ZX EXCISION OF TRANSVERSE COLON, VIA NATURAL OR ARTIFICIAL OPENING ENDOSCOPIC, DIAGNOSTIC: ICD-10-PCS | Performed by: INTERNAL MEDICINE

## 2021-08-10 PROCEDURE — 45380 COLONOSCOPY AND BIOPSY: CPT | Performed by: INTERNAL MEDICINE

## 2021-08-10 PROCEDURE — 0DBK8ZX EXCISION OF ASCENDING COLON, VIA NATURAL OR ARTIFICIAL OPENING ENDOSCOPIC, DIAGNOSTIC: ICD-10-PCS | Performed by: INTERNAL MEDICINE

## 2021-08-10 RX ORDER — SODIUM CHLORIDE, SODIUM LACTATE, POTASSIUM CHLORIDE, CALCIUM CHLORIDE 600; 310; 30; 20 MG/100ML; MG/100ML; MG/100ML; MG/100ML
INJECTION, SOLUTION INTRAVENOUS CONTINUOUS
Status: DISCONTINUED | OUTPATIENT
Start: 2021-08-10 | End: 2021-08-10

## 2021-08-10 RX ORDER — NALOXONE HYDROCHLORIDE 0.4 MG/ML
80 INJECTION, SOLUTION INTRAMUSCULAR; INTRAVENOUS; SUBCUTANEOUS AS NEEDED
Status: DISCONTINUED | OUTPATIENT
Start: 2021-08-10 | End: 2021-08-10

## 2021-08-10 RX ORDER — LIDOCAINE HYDROCHLORIDE 10 MG/ML
INJECTION, SOLUTION EPIDURAL; INFILTRATION; INTRACAUDAL; PERINEURAL AS NEEDED
Status: DISCONTINUED | OUTPATIENT
Start: 2021-08-10 | End: 2021-08-10 | Stop reason: SURG

## 2021-08-10 RX ADMIN — LIDOCAINE HYDROCHLORIDE 50 MG: 10 INJECTION, SOLUTION EPIDURAL; INFILTRATION; INTRACAUDAL; PERINEURAL at 10:37:00

## 2021-08-10 RX ADMIN — SODIUM CHLORIDE, SODIUM LACTATE, POTASSIUM CHLORIDE, CALCIUM CHLORIDE: 600; 310; 30; 20 INJECTION, SOLUTION INTRAVENOUS at 11:11:00

## 2021-08-10 NOTE — OPERATIVE REPORT
[facility] Endoscopy Report      Date of Procedure:  08/10/21      Preoperative Diagnosis:  Colorectal cancer screening      Postoperative Diagnosis:  1. Colon polyps  2.   Sigmoid colon diverticulosis      Procedure:    Colonoscopy with po lesions, vascular anomalies or signs of inflammation seen. Retroflexion in the rectum revealed no abnormalities. The procedure was well tolerated without immediate complication. Impression:  1. Diminutive/small colon polyps  2.   Sigmoid colon diver

## 2021-08-10 NOTE — ANESTHESIA PREPROCEDURE EVALUATION
Anesthesia PreOp Note    HPI:     Maryjane Schmitz is a 73year old male who presents for preoperative consultation requested by: Christen Mata MD    Date of Surgery: 8/10/2021    Procedure(s):  COLONOSCOPY  Indication: Screening for colon cancer RASH, OTHER (SEE COMMENTS)    Comment:Tide no dye     Skin reaction, also itching    Family History   Problem Relation Age of Onset   • No Known Problems Father    • No Known Problems Mother      Social History    Socioeconomic History Resp: 12   SpO2: 99%        Anesthesia Evaluation     Patient summary reviewed and Nursing notes reviewed    Airway   Mallampati: III  TM distance: >3 FB  Neck ROM: full  Dental      Pulmonary - negative ROS and normal exam   Cardiovascular - normal exam

## 2021-08-10 NOTE — H&P
History & Physical Examination    Patient Name: Maryjane Schmitz MRN: [id number]  CSN: [id number]  YOB: 1950    Diagnosis: Colorectal cancer screening      amLODIPine Besylate 10 MG Oral Tab, Take 1 tablet (10 mg total) by mouth daily. [place] EXTREMITIES [place]baldo ] [ X]    OTHER        [ x ] I have discussed the risks and benefits and alternatives with the patient/family. They understand and agree to proceed with plan of care.   [ x ] I have reviewed the History and Physical done within the last 30

## 2021-08-10 NOTE — ANESTHESIA POSTPROCEDURE EVALUATION
Patient: Enedina Mauro.    Procedure Summary     Date: 08/10/21 Room / Location: [facility] ENDOSCOPY 04 / [facility] ENDOSCOPY    Anesthesia Start: 1033 Anesthesia Stop:     Procedure: COLONOSCOPY (N/A ) Diagnosis:       Screening for colon cancer      (colon polyp

## 2021-08-13 ENCOUNTER — TELEPHONE (OUTPATIENT)
Dept: GASTROENTEROLOGY | Facility: CLINIC | Age: 71
End: 2021-08-13

## 2021-08-13 NOTE — TELEPHONE ENCOUNTER
----- Message from Arleth Davidson MD sent at 8/12/2021  7:56 PM CDT -----  I spoke to Ricarda Javed. He is feeling well. He had #4 subcentimeter adenomatous polyps removed. I have discussed the significance.   I have recommended a high-fiber diet for diverti

## 2021-08-13 NOTE — TELEPHONE ENCOUNTER
Recall colon in 3 years per Dr. Danie Riggins. Last done:8/10/21  Next due:8/10/24    Updated health maintenance and pt outreach.

## 2021-10-04 ENCOUNTER — TELEPHONE (OUTPATIENT)
Dept: INTERNAL MEDICINE CLINIC | Facility: CLINIC | Age: 71
End: 2021-10-04

## 2021-10-04 ENCOUNTER — OFFICE VISIT (OUTPATIENT)
Dept: INTERNAL MEDICINE CLINIC | Facility: CLINIC | Age: 71
End: 2021-10-04
Payer: MEDICARE

## 2021-10-04 ENCOUNTER — LAB ENCOUNTER (OUTPATIENT)
Dept: LAB | Age: 71
End: 2021-10-04
Attending: INTERNAL MEDICINE
Payer: MEDICARE

## 2021-10-04 VITALS
DIASTOLIC BLOOD PRESSURE: 72 MMHG | OXYGEN SATURATION: 97 % | HEIGHT: 73 IN | SYSTOLIC BLOOD PRESSURE: 132 MMHG | WEIGHT: 242 LBS | BODY MASS INDEX: 32.07 KG/M2 | HEART RATE: 82 BPM

## 2021-10-04 DIAGNOSIS — Z12.5 SCREENING PSA (PROSTATE SPECIFIC ANTIGEN): ICD-10-CM

## 2021-10-04 DIAGNOSIS — M20.60 DEFORMITY OF TOE, UNSPECIFIED LATERALITY: ICD-10-CM

## 2021-10-04 DIAGNOSIS — Z00.00 PHYSICAL EXAM, ANNUAL: Primary | ICD-10-CM

## 2021-10-04 DIAGNOSIS — D12.6 ADENOMATOUS POLYP OF COLON, UNSPECIFIED PART OF COLON: ICD-10-CM

## 2021-10-04 DIAGNOSIS — I10 BENIGN HYPERTENSION: ICD-10-CM

## 2021-10-04 DIAGNOSIS — J33.9 NASAL POLYPOSIS: ICD-10-CM

## 2021-10-04 DIAGNOSIS — I83.93 VARICOSE VEINS OF BOTH LOWER EXTREMITIES, UNSPECIFIED WHETHER COMPLICATED: ICD-10-CM

## 2021-10-04 DIAGNOSIS — L23.9 ALLERGIC CONTACT DERMATITIS, UNSPECIFIED TRIGGER: ICD-10-CM

## 2021-10-04 DIAGNOSIS — E78.1 HYPERTRIGLYCERIDEMIA: ICD-10-CM

## 2021-10-04 DIAGNOSIS — H93.12 TINNITUS, LEFT EAR: ICD-10-CM

## 2021-10-04 PROCEDURE — 99214 OFFICE O/P EST MOD 30 MIN: CPT | Performed by: INTERNAL MEDICINE

## 2021-10-04 PROCEDURE — 96127 BRIEF EMOTIONAL/BEHAV ASSMT: CPT | Performed by: INTERNAL MEDICINE

## 2021-10-04 PROCEDURE — G0439 PPPS, SUBSEQ VISIT: HCPCS | Performed by: INTERNAL MEDICINE

## 2021-10-04 PROCEDURE — 80061 LIPID PANEL: CPT

## 2021-10-04 PROCEDURE — 80053 COMPREHEN METABOLIC PANEL: CPT

## 2021-10-04 PROCEDURE — 85025 COMPLETE CBC W/AUTO DIFF WBC: CPT

## 2021-10-04 PROCEDURE — 36415 COLL VENOUS BLD VENIPUNCTURE: CPT

## 2021-10-04 PROCEDURE — 84443 ASSAY THYROID STIM HORMONE: CPT

## 2021-10-04 RX ORDER — AMLODIPINE BESYLATE 10 MG/1
10 TABLET ORAL DAILY
Qty: 90 TABLET | Refills: 3 | Status: SHIPPED | OUTPATIENT
Start: 2021-10-04

## 2021-10-04 NOTE — PROGRESS NOTES
Sandrine Mcadams. is a 73year old male.     HPI:   Patient presents with:  Physical: Here today for Medicare Annual Physical       72 y/o M here for subsequent Medicare annual wellness exam; screening colonoscopy 8/10/21 by GI Dr Luke Anderson for colon ca 12 % External Cream ATAA legs  g 1   • Fluticasone Propionate 50 MCG/ACT Nasal Suspension 1 spray by Nasal route 2 (two) times daily.  1 Bottle 11       Allergies:    Soap                    RASH, OTHER (SEE COMMENTS)    Comment:Tide no dye     Skin 0-No    Fall/Risk Scorin    Scoring Interpretation: 0 - 3 No Risk     Depression Screening (PHQ-2/PHQ-9): Over the LAST 2 WEEKS                      Advance Directives     Do you have a healthcare power of ?: Yes    Do you have a living will?: PNEUMOCOCCAL IMM (PNEUMOVAX)    Update Immunization Activity if applicable    Hepatitis B No orders found for this or any previous visit. Update Immunization Activity if applicable    Tetanus No orders found for this or any previous visit.  Update Immunizat easy bleeding and easy bruising  Integumentary:  Negative for pruritus and rash  Neurological:  Negative for gait disturbance; negative for paresthesias   All other review of systems are negative.         PHYSICAL EXAM:   Blood pressure 132/72, pulse 82, he navigational sinus surgery, Bilateral transnasal endoscopic: total ethmoidectomies, sphenoidotomies, frontal sinusotomies, maxillary antrostomies with tissue removal, nasal polypectomies  and bilateral inferior turbinate outfracture with submucosal resecti

## 2021-10-04 NOTE — TELEPHONE ENCOUNTER
Spoke with Margaret Lundberg to relay MD message below. Answered his questions in regards to what PSA stood for (Prostate Specific Antigen) and as to why specialist is recommended (his level is higher than normal, which should be looked further into by a Urologist).

## 2021-10-04 NOTE — TELEPHONE ENCOUNTER
PSA 5.15    Imp - elevated PSA    Rec- advise urology referral with Dr Steven Trammell    [address] [facility]     [phone]    Patient not available; MALENA; Liliana Stevens

## 2021-11-10 ENCOUNTER — OFFICE VISIT (OUTPATIENT)
Dept: INTERNAL MEDICINE CLINIC | Facility: CLINIC | Age: 71
End: 2021-11-10
Payer: MEDICARE

## 2021-11-10 VITALS
DIASTOLIC BLOOD PRESSURE: 80 MMHG | BODY MASS INDEX: 32.2 KG/M2 | SYSTOLIC BLOOD PRESSURE: 132 MMHG | WEIGHT: 243 LBS | HEIGHT: 73 IN | HEART RATE: 80 BPM | OXYGEN SATURATION: 98 %

## 2021-11-10 DIAGNOSIS — S91.311A LACERATION OF RIGHT FOOT, INITIAL ENCOUNTER: Primary | ICD-10-CM

## 2021-11-10 PROCEDURE — 99213 OFFICE O/P EST LOW 20 MIN: CPT | Performed by: INTERNAL MEDICINE

## 2021-11-10 NOTE — PROGRESS NOTES
Eric Corrales is a 73year old male. HPI:   Patient presents with:  Checkup:  To address cut on foot       74 y/o M with severe Tinea pedis who has sustained a skin crack to left heel; he reports crack developed after wearing an old pair of sandals; Allergies:    Soap                    RASH, OTHER (SEE COMMENTS)    Comment:Tide no dye     Skin reaction, also itching              ROS:   Constitutional: no weight loss; no fatigue  Cardiovascular:  Negative for chest pain; negative palpitations  R and clotrimazole 1% cream ATAA BID  -has irritation to urethral meatus intermittently since June 2019; saw MARTI ALEXANDER at [facility] with avoidance of Dial soap     HTN  SBP 130s on amlodipine 10 mg po qD; CPM; last EKG in Feb 2019 showed NSR without isch

## 2021-11-11 ENCOUNTER — OFFICE VISIT (OUTPATIENT)
Dept: SURGERY | Facility: CLINIC | Age: 71
End: 2021-11-11
Payer: MEDICARE

## 2021-11-11 VITALS
WEIGHT: 243 LBS | HEIGHT: 73 IN | DIASTOLIC BLOOD PRESSURE: 70 MMHG | RESPIRATION RATE: 16 BRPM | SYSTOLIC BLOOD PRESSURE: 126 MMHG | HEART RATE: 66 BPM | BODY MASS INDEX: 32.2 KG/M2

## 2021-11-11 DIAGNOSIS — R82.90 URINE FINDING: ICD-10-CM

## 2021-11-11 DIAGNOSIS — R97.20 ELEVATED PSA: Primary | ICD-10-CM

## 2021-11-11 PROCEDURE — 81002 URINALYSIS NONAUTO W/O SCOPE: CPT | Performed by: UROLOGY

## 2021-11-11 PROCEDURE — 99204 OFFICE O/P NEW MOD 45 MIN: CPT | Performed by: UROLOGY

## 2021-11-11 RX ORDER — CEFDINIR 300 MG/1
300 CAPSULE ORAL EVERY 12 HOURS
Qty: 6 CAPSULE | Refills: 0 | Status: SHIPPED | OUTPATIENT
Start: 2021-11-11 | End: 2021-11-14

## 2021-11-11 RX ORDER — CIPROFLOXACIN 500 MG/1
500 TABLET, FILM COATED ORAL 2 TIMES DAILY
Qty: 6 TABLET | Refills: 0 | Status: SHIPPED | OUTPATIENT
Start: 2021-11-11 | End: 2021-11-14

## 2021-11-11 NOTE — PROGRESS NOTES
[address] [facility] Urology  Initial Office Consultation    HPI:   Meet Reagan. is a 73year old male here today for consultation at the request of, and a copy of this note will be sent to, Chagn Plasencia MD.    1. Elevated PSA  Patient without certai soft.      Tenderness: There is no abdominal tenderness. Genitourinary:     Penis: Circumcised. Testes: Normal.         Right: Mass, tenderness or swelling not present. Left: Mass, tenderness or swelling not present.       Epididymis:      R TRUS-guided prostate needle biopsy including a 1-2% risk of infection and sepsis as well as a 1-2% risk of transient urinary retention.   We also discussed common transient side effects including blood in the stool or toilet paper, blood in the urine as wel

## 2021-11-18 ENCOUNTER — LAB ENCOUNTER (OUTPATIENT)
Dept: LAB | Age: 71
End: 2021-11-18
Attending: UROLOGY
Payer: MEDICARE

## 2021-11-18 DIAGNOSIS — R97.20 ELEVATED PSA: ICD-10-CM

## 2021-11-18 PROCEDURE — 84153 ASSAY OF PSA TOTAL: CPT

## 2021-11-18 PROCEDURE — 84154 ASSAY OF PSA FREE: CPT

## 2021-11-18 PROCEDURE — 36415 COLL VENOUS BLD VENIPUNCTURE: CPT

## 2021-11-19 ENCOUNTER — TELEPHONE (OUTPATIENT)
Dept: SURGERY | Facility: CLINIC | Age: 71
End: 2021-11-19

## 2021-11-19 NOTE — TELEPHONE ENCOUNTER
I s/w pt and gave him the results in the msg from [facility] as stated below and he verbalized understanding and stated that he has his prostate BX procedure schd for 12/29.

## 2021-11-19 NOTE — TELEPHONE ENCOUNTER
----- Message from Marlo Wise, ИВАН sent at 11/19/2021  9:34 AM CST -----  Please let patient know his PSA remains elevated at 5.65 ng/ml. Your % free PSA is 19%, this equates to a 30.3% probability of prostate cancer, or 69.7% you do not.    Lauren Bartlett

## 2021-12-21 ENCOUNTER — TELEPHONE (OUTPATIENT)
Dept: SURGERY | Facility: CLINIC | Age: 71
End: 2021-12-21

## 2021-12-21 NOTE — TELEPHONE ENCOUNTER
Pt. wants to cancel his proc for next. week and he does not want to resched 12/29/2021 biopsy. Pt. States that he will be going to [facility] for 2nd opinion.

## 2022-09-01 ENCOUNTER — OFFICE VISIT (OUTPATIENT)
Dept: INTERNAL MEDICINE CLINIC | Facility: CLINIC | Age: 72
End: 2022-09-01
Payer: MEDICARE

## 2022-09-01 VITALS
DIASTOLIC BLOOD PRESSURE: 68 MMHG | SYSTOLIC BLOOD PRESSURE: 132 MMHG | HEART RATE: 79 BPM | BODY MASS INDEX: 29.05 KG/M2 | HEIGHT: 73 IN | WEIGHT: 219.19 LBS | OXYGEN SATURATION: 97 % | TEMPERATURE: 98 F

## 2022-09-01 DIAGNOSIS — I10 BENIGN HYPERTENSION: ICD-10-CM

## 2022-09-01 DIAGNOSIS — E78.00 HYPERCHOLESTEREMIA: ICD-10-CM

## 2022-09-01 DIAGNOSIS — I87.2 CHRONIC VENOUS INSUFFICIENCY: ICD-10-CM

## 2022-09-01 DIAGNOSIS — H10.212 ACUTE CHEMICAL CONJUNCTIVITIS OF LEFT EYE: Primary | ICD-10-CM

## 2022-09-01 PROCEDURE — 99214 OFFICE O/P EST MOD 30 MIN: CPT | Performed by: INTERNAL MEDICINE

## 2022-09-01 PROCEDURE — 1125F AMNT PAIN NOTED PAIN PRSNT: CPT | Performed by: INTERNAL MEDICINE

## 2022-09-01 RX ORDER — ASPIRIN 81 MG/1
81 TABLET, CHEWABLE ORAL DAILY
COMMUNITY
Start: 2022-08-08

## 2022-09-01 RX ORDER — ATORVASTATIN CALCIUM 40 MG/1
40 TABLET, FILM COATED ORAL DAILY
COMMUNITY
Start: 2022-08-08

## 2022-09-01 RX ORDER — TOBRAMYCIN AND DEXAMETHASONE 3; 1 MG/ML; MG/ML
1 SUSPENSION/ DROPS OPHTHALMIC
Qty: 5 ML | Refills: 0 | Status: SHIPPED | OUTPATIENT
Start: 2022-09-01

## 2022-09-29 ENCOUNTER — OFFICE VISIT (OUTPATIENT)
Dept: OTOLARYNGOLOGY | Facility: CLINIC | Age: 72
End: 2022-09-29

## 2022-09-29 DIAGNOSIS — J33.9 NASAL POLYPS: Primary | ICD-10-CM

## 2022-09-29 DIAGNOSIS — J01.81 OTHER ACUTE RECURRENT SINUSITIS: ICD-10-CM

## 2022-09-29 PROCEDURE — 99213 OFFICE O/P EST LOW 20 MIN: CPT | Performed by: SPECIALIST

## 2022-09-29 RX ORDER — METHYLPREDNISOLONE 4 MG/1
TABLET ORAL
Qty: 21 TABLET | Refills: 0 | Status: SHIPPED | OUTPATIENT
Start: 2022-09-29

## 2022-09-29 RX ORDER — CEFDINIR 300 MG/1
300 CAPSULE ORAL 2 TIMES DAILY
Qty: 28 CAPSULE | Refills: 0 | Status: SHIPPED | OUTPATIENT
Start: 2022-09-29

## 2022-09-29 NOTE — PATIENT INSTRUCTIONS
Bilateral nasal polyps and bilateral sinusitis. I placed you on a Medrol Dosepak and cefdinir. Follow-up in 3 weeks time, sooner if problems. Very likely a fiberoptic scope will be done at that point in time.

## 2022-10-05 ENCOUNTER — OFFICE VISIT (OUTPATIENT)
Dept: INTERNAL MEDICINE CLINIC | Facility: CLINIC | Age: 72
End: 2022-10-05
Payer: MEDICARE

## 2022-10-05 VITALS
WEIGHT: 221 LBS | HEART RATE: 60 BPM | HEIGHT: 73 IN | SYSTOLIC BLOOD PRESSURE: 130 MMHG | OXYGEN SATURATION: 96 % | DIASTOLIC BLOOD PRESSURE: 64 MMHG | BODY MASS INDEX: 29.29 KG/M2 | TEMPERATURE: 98 F

## 2022-10-05 DIAGNOSIS — I83.93 VARICOSE VEINS OF BOTH LOWER EXTREMITIES, UNSPECIFIED WHETHER COMPLICATED: ICD-10-CM

## 2022-10-05 DIAGNOSIS — R97.20 ELEVATED PSA: ICD-10-CM

## 2022-10-05 DIAGNOSIS — Z00.00 PHYSICAL EXAM: Primary | ICD-10-CM

## 2022-10-05 DIAGNOSIS — M20.60 DEFORMITY OF TOE, UNSPECIFIED LATERALITY: ICD-10-CM

## 2022-10-05 DIAGNOSIS — L23.9 ALLERGIC CONTACT DERMATITIS, UNSPECIFIED TRIGGER: ICD-10-CM

## 2022-10-05 DIAGNOSIS — I10 BENIGN HYPERTENSION: ICD-10-CM

## 2022-10-05 DIAGNOSIS — E78.1 HYPERTRIGLYCERIDEMIA: ICD-10-CM

## 2022-10-05 DIAGNOSIS — I87.2 CHRONIC VENOUS INSUFFICIENCY: ICD-10-CM

## 2022-10-05 DIAGNOSIS — D12.6 ADENOMATOUS POLYP OF COLON, UNSPECIFIED PART OF COLON: ICD-10-CM

## 2022-10-05 DIAGNOSIS — J33.9 NASAL POLYPOSIS: ICD-10-CM

## 2022-10-05 DIAGNOSIS — H10.212 ACUTE CHEMICAL CONJUNCTIVITIS OF LEFT EYE: ICD-10-CM

## 2022-10-05 DIAGNOSIS — B35.3 TINEA PEDIS, UNSPECIFIED LATERALITY: ICD-10-CM

## 2022-10-05 DIAGNOSIS — H93.12 TINNITUS, LEFT EAR: ICD-10-CM

## 2022-10-05 DIAGNOSIS — E78.00 HYPERCHOLESTEREMIA: ICD-10-CM

## 2022-10-05 PROCEDURE — 99214 OFFICE O/P EST MOD 30 MIN: CPT | Performed by: INTERNAL MEDICINE

## 2022-10-05 PROCEDURE — 1126F AMNT PAIN NOTED NONE PRSNT: CPT | Performed by: INTERNAL MEDICINE

## 2022-10-05 PROCEDURE — G0439 PPPS, SUBSEQ VISIT: HCPCS | Performed by: INTERNAL MEDICINE

## 2022-10-05 RX ORDER — ATORVASTATIN CALCIUM 40 MG/1
40 TABLET, FILM COATED ORAL DAILY
Qty: 90 TABLET | Refills: 3 | Status: SHIPPED | OUTPATIENT
Start: 2022-10-05

## 2022-10-05 RX ORDER — AMLODIPINE BESYLATE 10 MG/1
10 TABLET ORAL DAILY
Qty: 90 TABLET | Refills: 3 | Status: SHIPPED | OUTPATIENT
Start: 2022-10-05

## 2022-10-07 ENCOUNTER — TELEPHONE (OUTPATIENT)
Dept: OTOLARYNGOLOGY | Facility: CLINIC | Age: 72
End: 2022-10-07

## 2022-10-10 ENCOUNTER — LAB ENCOUNTER (OUTPATIENT)
Dept: LAB | Age: 72
End: 2022-10-10
Attending: INTERNAL MEDICINE
Payer: MEDICARE

## 2022-10-10 DIAGNOSIS — E78.00 HYPERCHOLESTEREMIA: ICD-10-CM

## 2022-10-10 DIAGNOSIS — I10 BENIGN HYPERTENSION: ICD-10-CM

## 2022-10-10 DIAGNOSIS — R97.20 ELEVATED PSA: ICD-10-CM

## 2022-10-10 LAB
ALBUMIN SERPL-MCNC: 3.6 G/DL (ref 3.4–5)
ALBUMIN/GLOB SERPL: 0.8 {RATIO} (ref 1–2)
ALP LIVER SERPL-CCNC: 75 U/L
ALT SERPL-CCNC: 31 U/L
ANION GAP SERPL CALC-SCNC: 8 MMOL/L (ref 0–18)
AST SERPL-CCNC: 13 U/L (ref 15–37)
BASOPHILS # BLD AUTO: 0.1 X10(3) UL (ref 0–0.2)
BASOPHILS NFR BLD AUTO: 0.9 %
BILIRUB SERPL-MCNC: 1.1 MG/DL (ref 0.1–2)
BUN BLD-MCNC: 17 MG/DL (ref 7–18)
BUN/CREAT SERPL: 16.3 (ref 10–20)
CALCIUM BLD-MCNC: 9.2 MG/DL (ref 8.5–10.1)
CHLORIDE SERPL-SCNC: 104 MMOL/L (ref 98–112)
CHOLEST SERPL-MCNC: 136 MG/DL (ref ?–200)
CO2 SERPL-SCNC: 28 MMOL/L (ref 21–32)
CREAT BLD-MCNC: 1.04 MG/DL
DEPRECATED RDW RBC AUTO: 44.5 FL (ref 35.1–46.3)
EOSINOPHIL # BLD AUTO: 0.99 X10(3) UL (ref 0–0.7)
EOSINOPHIL NFR BLD AUTO: 8.7 %
ERYTHROCYTE [DISTWIDTH] IN BLOOD BY AUTOMATED COUNT: 13.3 % (ref 11–15)
FASTING PATIENT LIPID ANSWER: YES
FASTING STATUS PATIENT QL REPORTED: YES
GFR SERPLBLD BASED ON 1.73 SQ M-ARVRAT: 77 ML/MIN/1.73M2 (ref 60–?)
GLOBULIN PLAS-MCNC: 4.3 G/DL (ref 2.8–4.4)
GLUCOSE BLD-MCNC: 110 MG/DL (ref 70–99)
HCT VFR BLD AUTO: 49.6 %
HDLC SERPL-MCNC: 38 MG/DL (ref 40–59)
HGB BLD-MCNC: 15.6 G/DL
IMM GRANULOCYTES # BLD AUTO: 0.04 X10(3) UL (ref 0–1)
IMM GRANULOCYTES NFR BLD: 0.4 %
LDLC SERPL CALC-MCNC: 73 MG/DL (ref ?–100)
LYMPHOCYTES # BLD AUTO: 2.2 X10(3) UL (ref 1–4)
LYMPHOCYTES NFR BLD AUTO: 19.3 %
MCH RBC QN AUTO: 28.2 PG (ref 26–34)
MCHC RBC AUTO-ENTMCNC: 31.5 G/DL (ref 31–37)
MCV RBC AUTO: 89.7 FL
MONOCYTES # BLD AUTO: 0.84 X10(3) UL (ref 0.1–1)
MONOCYTES NFR BLD AUTO: 7.4 %
NEUTROPHILS # BLD AUTO: 7.25 X10 (3) UL (ref 1.5–7.7)
NEUTROPHILS # BLD AUTO: 7.25 X10(3) UL (ref 1.5–7.7)
NEUTROPHILS NFR BLD AUTO: 63.3 %
NONHDLC SERPL-MCNC: 98 MG/DL (ref ?–130)
OSMOLALITY SERPL CALC.SUM OF ELEC: 292 MOSM/KG (ref 275–295)
PLATELET # BLD AUTO: 264 10(3)UL (ref 150–450)
POTASSIUM SERPL-SCNC: 4.2 MMOL/L (ref 3.5–5.1)
PROT SERPL-MCNC: 7.9 G/DL (ref 6.4–8.2)
PSA FREE MFR SERPL: 19 %
PSA FREE SERPL-MCNC: 0.99 NG/ML
PSA SERPL-MCNC: 5.26 NG/ML (ref ?–4)
RBC # BLD AUTO: 5.53 X10(6)UL
SODIUM SERPL-SCNC: 140 MMOL/L (ref 136–145)
TRIGL SERPL-MCNC: 140 MG/DL (ref 30–149)
TSI SER-ACNC: 2.05 MIU/ML (ref 0.36–3.74)
VLDLC SERPL CALC-MCNC: 22 MG/DL (ref 0–30)
WBC # BLD AUTO: 11.4 X10(3) UL (ref 4–11)

## 2022-10-10 PROCEDURE — 36415 COLL VENOUS BLD VENIPUNCTURE: CPT

## 2022-10-10 PROCEDURE — 84153 ASSAY OF PSA TOTAL: CPT

## 2022-10-10 PROCEDURE — 80053 COMPREHEN METABOLIC PANEL: CPT

## 2022-10-10 PROCEDURE — 85025 COMPLETE CBC W/AUTO DIFF WBC: CPT

## 2022-10-10 PROCEDURE — 80061 LIPID PANEL: CPT

## 2022-10-10 PROCEDURE — 84443 ASSAY THYROID STIM HORMONE: CPT

## 2022-10-10 PROCEDURE — 84154 ASSAY OF PSA FREE: CPT

## 2022-10-20 ENCOUNTER — OFFICE VISIT (OUTPATIENT)
Dept: OTOLARYNGOLOGY | Facility: CLINIC | Age: 72
End: 2022-10-20
Payer: MEDICARE

## 2022-10-20 VITALS — TEMPERATURE: 98 F | HEIGHT: 73 IN | BODY MASS INDEX: 29.29 KG/M2 | WEIGHT: 221 LBS

## 2022-10-20 DIAGNOSIS — J01.81 OTHER ACUTE RECURRENT SINUSITIS: ICD-10-CM

## 2022-10-20 DIAGNOSIS — J33.9 NASAL POLYPS: Primary | ICD-10-CM

## 2022-10-20 PROCEDURE — 99213 OFFICE O/P EST LOW 20 MIN: CPT | Performed by: SPECIALIST

## 2022-10-20 PROCEDURE — 31231 NASAL ENDOSCOPY DX: CPT | Performed by: SPECIALIST

## 2022-10-20 RX ORDER — CEFDINIR 300 MG/1
300 CAPSULE ORAL 2 TIMES DAILY
Qty: 14 CAPSULE | Refills: 0 | Status: SHIPPED | OUTPATIENT
Start: 2022-10-20

## 2022-10-20 NOTE — PATIENT INSTRUCTIONS
Persistent right sinusitis. I placed you on an additional week of Ceftin. Your polyps in your nose are mostly superior on the roof of the nose. I did explain to that these cannot be removed in their entirety. A CT scan of the sinuses was ordered.

## 2022-10-21 ENCOUNTER — HOSPITAL ENCOUNTER (OUTPATIENT)
Dept: CT IMAGING | Facility: HOSPITAL | Age: 72
Discharge: HOME | End: 2022-10-21
Attending: SPECIALIST
Payer: MEDICARE

## 2022-10-21 DIAGNOSIS — J33.9 NASAL POLYPS: ICD-10-CM

## 2022-10-21 PROCEDURE — 70486 CT MAXILLOFACIAL W/O DYE: CPT | Performed by: SPECIALIST

## 2022-11-03 ENCOUNTER — TELEPHONE (OUTPATIENT)
Dept: OTOLARYNGOLOGY | Facility: CLINIC | Age: 72
End: 2022-11-03

## 2022-11-03 NOTE — TELEPHONE ENCOUNTER
Patient is scheduled for procedure on 11/21/22 at [address] [place] [address] with Dr. Patty Faust.

## 2022-11-05 ENCOUNTER — OFFICE VISIT (OUTPATIENT)
Dept: OTOLARYNGOLOGY | Facility: CLINIC | Age: 72
End: 2022-11-05
Payer: MEDICARE

## 2022-11-05 DIAGNOSIS — J01.81 OTHER ACUTE RECURRENT SINUSITIS: ICD-10-CM

## 2022-11-05 DIAGNOSIS — J33.9 NASAL POLYPS: Primary | ICD-10-CM

## 2022-11-05 DIAGNOSIS — J32.4 CHRONIC PANSINUSITIS: ICD-10-CM

## 2022-11-05 PROCEDURE — 99213 OFFICE O/P EST LOW 20 MIN: CPT | Performed by: SPECIALIST

## 2022-11-05 NOTE — PATIENT INSTRUCTIONS
We reviewed the CT scan   Surgery was scheduled for you. Lam Sanches from our office will call you. Please stop the aspirin 1 week prior to the procedure. No sinusitis after last week of antibiotic therapy.

## 2022-11-07 ENCOUNTER — TELEPHONE (OUTPATIENT)
Dept: INTERNAL MEDICINE CLINIC | Facility: CLINIC | Age: 72
End: 2022-11-07

## 2022-11-07 DIAGNOSIS — I10 BENIGN HYPERTENSION: Primary | ICD-10-CM

## 2022-11-07 NOTE — TELEPHONE ENCOUNTER
Patient is calling he will be having nose polyp surgery on 11/21 with Dr Cindy Shepard. Patient saw Dr Jesica Colbert on 10/5 for a MA, does patient still need to see Dr Jesica Colbert for surgical clearance or will he sign the clearance?     Please call patient [phone]

## 2022-11-12 ENCOUNTER — LAB ENCOUNTER (OUTPATIENT)
Dept: LAB | Facility: HOSPITAL | Age: 72
End: 2022-11-12
Attending: INTERNAL MEDICINE
Payer: MEDICARE

## 2022-11-12 DIAGNOSIS — I10 BENIGN HYPERTENSION: ICD-10-CM

## 2022-11-12 LAB
ATRIAL RATE: 67 BPM
P AXIS: 51 DEGREES
P-R INTERVAL: 180 MS
Q-T INTERVAL: 406 MS
QRS DURATION: 98 MS
QTC CALCULATION (BEZET): 429 MS
R AXIS: -18 DEGREES
T AXIS: 22 DEGREES
VENTRICULAR RATE: 67 BPM

## 2022-11-12 PROCEDURE — 93005 ELECTROCARDIOGRAM TRACING: CPT

## 2022-11-12 PROCEDURE — 93010 ELECTROCARDIOGRAM REPORT: CPT | Performed by: INTERNAL MEDICINE

## 2022-11-16 DIAGNOSIS — J33.9 NOSE POLYP: ICD-10-CM

## 2022-11-16 DIAGNOSIS — J32.2 CHRONIC ETHMOIDAL SINUSITIS: Primary | ICD-10-CM

## 2022-11-16 DIAGNOSIS — J32.0 CHRONIC MAXILLARY SINUSITIS: ICD-10-CM

## 2022-11-18 ENCOUNTER — LAB ENCOUNTER (OUTPATIENT)
Dept: LAB | Facility: HOSPITAL | Age: 72
End: 2022-11-18
Attending: SPECIALIST
Payer: MEDICARE

## 2022-11-18 DIAGNOSIS — J32.2 CHRONIC ETHMOIDAL SINUSITIS: ICD-10-CM

## 2022-11-18 DIAGNOSIS — J32.0 CHRONIC MAXILLARY SINUSITIS: ICD-10-CM

## 2022-11-18 DIAGNOSIS — J33.9 NOSE POLYP: ICD-10-CM

## 2022-11-18 LAB — SARS-COV-2 RNA RESP QL NAA+PROBE: NOT DETECTED

## 2022-11-21 ENCOUNTER — HOSPITAL ENCOUNTER (OUTPATIENT)
Facility: HOSPITAL | Age: 72
Setting detail: HOSPITAL OUTPATIENT SURGERY
Discharge: HOME | End: 2022-11-21
Attending: SPECIALIST | Admitting: SPECIALIST
Payer: MEDICARE

## 2022-11-21 ENCOUNTER — ANESTHESIA EVENT (OUTPATIENT)
Dept: SURGERY | Facility: HOSPITAL | Age: 72
End: 2022-11-21
Payer: MEDICARE

## 2022-11-21 ENCOUNTER — ANESTHESIA (OUTPATIENT)
Dept: SURGERY | Facility: HOSPITAL | Age: 72
End: 2022-11-21
Payer: MEDICARE

## 2022-11-21 VITALS
RESPIRATION RATE: 16 BRPM | TEMPERATURE: 98 F | DIASTOLIC BLOOD PRESSURE: 61 MMHG | OXYGEN SATURATION: 96 % | WEIGHT: 221.88 LBS | SYSTOLIC BLOOD PRESSURE: 137 MMHG | HEIGHT: 73 IN | HEART RATE: 78 BPM | BODY MASS INDEX: 29.41 KG/M2

## 2022-11-21 DIAGNOSIS — J32.2 CHRONIC ETHMOIDAL SINUSITIS: ICD-10-CM

## 2022-11-21 DIAGNOSIS — J32.0 CHRONIC MAXILLARY SINUSITIS: ICD-10-CM

## 2022-11-21 DIAGNOSIS — J33.9 NOSE POLYP: ICD-10-CM

## 2022-11-21 PROCEDURE — 88305 TISSUE EXAM BY PATHOLOGIST: CPT | Performed by: SPECIALIST

## 2022-11-21 PROCEDURE — 099Q8ZZ DRAINAGE OF RIGHT MAXILLARY SINUS, VIA NATURAL OR ARTIFICIAL OPENING ENDOSCOPIC: ICD-10-PCS | Performed by: SPECIALIST

## 2022-11-21 PROCEDURE — 099R8ZZ DRAINAGE OF LEFT MAXILLARY SINUS, VIA NATURAL OR ARTIFICIAL OPENING ENDOSCOPIC: ICD-10-PCS | Performed by: SPECIALIST

## 2022-11-21 PROCEDURE — 09TV8ZZ RESECTION OF LEFT ETHMOID SINUS, VIA NATURAL OR ARTIFICIAL OPENING ENDOSCOPIC: ICD-10-PCS | Performed by: SPECIALIST

## 2022-11-21 PROCEDURE — 88312 SPECIAL STAINS GROUP 1: CPT | Performed by: SPECIALIST

## 2022-11-21 PROCEDURE — 09TU8ZZ RESECTION OF RIGHT ETHMOID SINUS, VIA NATURAL OR ARTIFICIAL OPENING ENDOSCOPIC: ICD-10-PCS | Performed by: SPECIALIST

## 2022-11-21 PROCEDURE — 09HK8YZ INSERTION OF OTHER DEVICE INTO NASAL MUCOSA AND SOFT TISSUE, VIA NATURAL OR ARTIFICIAL OPENING ENDOSCOPIC: ICD-10-PCS | Performed by: SPECIALIST

## 2022-11-21 DEVICE — PROPEL SINUS IMPLANT
Type: IMPLANTABLE DEVICE | Site: NOSE | Status: FUNCTIONAL
Brand: PROPEL

## 2022-11-21 RX ORDER — HYDROCODONE BITARTRATE AND ACETAMINOPHEN 7.5; 325 MG/1; MG/1
1-2 TABLET ORAL EVERY 4 HOURS PRN
Qty: 20 TABLET | Refills: 0 | Status: SHIPPED | OUTPATIENT
Start: 2022-11-21

## 2022-11-21 RX ORDER — HYDROMORPHONE HYDROCHLORIDE 1 MG/ML
0.4 INJECTION, SOLUTION INTRAMUSCULAR; INTRAVENOUS; SUBCUTANEOUS EVERY 5 MIN PRN
Status: DISCONTINUED | OUTPATIENT
Start: 2022-11-21 | End: 2022-11-21

## 2022-11-21 RX ORDER — HYDROMORPHONE HYDROCHLORIDE 1 MG/ML
0.2 INJECTION, SOLUTION INTRAMUSCULAR; INTRAVENOUS; SUBCUTANEOUS EVERY 5 MIN PRN
Status: DISCONTINUED | OUTPATIENT
Start: 2022-11-21 | End: 2022-11-21

## 2022-11-21 RX ORDER — PHENYLEPHRINE HCL 10 MG/ML
VIAL (ML) INJECTION AS NEEDED
Status: DISCONTINUED | OUTPATIENT
Start: 2022-11-21 | End: 2022-11-21 | Stop reason: SURG

## 2022-11-21 RX ORDER — ONDANSETRON 2 MG/ML
INJECTION INTRAMUSCULAR; INTRAVENOUS AS NEEDED
Status: DISCONTINUED | OUTPATIENT
Start: 2022-11-21 | End: 2022-11-21 | Stop reason: SURG

## 2022-11-21 RX ORDER — HYDROMORPHONE HYDROCHLORIDE 1 MG/ML
0.6 INJECTION, SOLUTION INTRAMUSCULAR; INTRAVENOUS; SUBCUTANEOUS EVERY 5 MIN PRN
Status: DISCONTINUED | OUTPATIENT
Start: 2022-11-21 | End: 2022-11-21

## 2022-11-21 RX ORDER — LIDOCAINE HYDROCHLORIDE 10 MG/ML
INJECTION, SOLUTION EPIDURAL; INFILTRATION; INTRACAUDAL; PERINEURAL AS NEEDED
Status: DISCONTINUED | OUTPATIENT
Start: 2022-11-21 | End: 2022-11-21 | Stop reason: SURG

## 2022-11-21 RX ORDER — METOCLOPRAMIDE HYDROCHLORIDE 5 MG/ML
10 INJECTION INTRAMUSCULAR; INTRAVENOUS EVERY 8 HOURS PRN
Status: DISCONTINUED | OUTPATIENT
Start: 2022-11-21 | End: 2022-11-21

## 2022-11-21 RX ORDER — ONDANSETRON 2 MG/ML
4 INJECTION INTRAMUSCULAR; INTRAVENOUS EVERY 6 HOURS PRN
Status: DISCONTINUED | OUTPATIENT
Start: 2022-11-21 | End: 2022-11-21

## 2022-11-21 RX ORDER — CEPHALEXIN 500 MG/1
500 CAPSULE ORAL EVERY 8 HOURS
Qty: 21 CAPSULE | Refills: 0 | Status: SHIPPED | OUTPATIENT
Start: 2022-11-21

## 2022-11-21 RX ORDER — CEFAZOLIN SODIUM/WATER 2 G/20 ML
SYRINGE (ML) INTRAVENOUS AS NEEDED
Status: DISCONTINUED | OUTPATIENT
Start: 2022-11-21 | End: 2022-11-21 | Stop reason: SURG

## 2022-11-21 RX ORDER — EPHEDRINE SULFATE 50 MG/ML
INJECTION, SOLUTION INTRAVENOUS AS NEEDED
Status: DISCONTINUED | OUTPATIENT
Start: 2022-11-21 | End: 2022-11-21 | Stop reason: SURG

## 2022-11-21 RX ORDER — BACITRACIN ZINC 500 [USP'U]/G
OINTMENT TOPICAL AS NEEDED
Status: DISCONTINUED | OUTPATIENT
Start: 2022-11-21 | End: 2022-11-21 | Stop reason: HOSPADM

## 2022-11-21 RX ORDER — ROCURONIUM BROMIDE 10 MG/ML
INJECTION, SOLUTION INTRAVENOUS AS NEEDED
Status: DISCONTINUED | OUTPATIENT
Start: 2022-11-21 | End: 2022-11-21 | Stop reason: SURG

## 2022-11-21 RX ORDER — ACETAMINOPHEN 500 MG
1000 TABLET ORAL ONCE
Status: COMPLETED | OUTPATIENT
Start: 2022-11-21 | End: 2022-11-21

## 2022-11-21 RX ORDER — LIDOCAINE HYDROCHLORIDE AND EPINEPHRINE 10; 10 MG/ML; UG/ML
INJECTION, SOLUTION INFILTRATION; PERINEURAL AS NEEDED
Status: DISCONTINUED | OUTPATIENT
Start: 2022-11-21 | End: 2022-11-21 | Stop reason: HOSPADM

## 2022-11-21 RX ORDER — GLYCOPYRROLATE 0.2 MG/ML
INJECTION, SOLUTION INTRAMUSCULAR; INTRAVENOUS AS NEEDED
Status: DISCONTINUED | OUTPATIENT
Start: 2022-11-21 | End: 2022-11-21 | Stop reason: SURG

## 2022-11-21 RX ORDER — SODIUM CHLORIDE, SODIUM LACTATE, POTASSIUM CHLORIDE, CALCIUM CHLORIDE 600; 310; 30; 20 MG/100ML; MG/100ML; MG/100ML; MG/100ML
INJECTION, SOLUTION INTRAVENOUS CONTINUOUS
Status: DISCONTINUED | OUTPATIENT
Start: 2022-11-21 | End: 2022-11-21

## 2022-11-21 RX ORDER — MORPHINE SULFATE 10 MG/ML
6 INJECTION, SOLUTION INTRAMUSCULAR; INTRAVENOUS EVERY 10 MIN PRN
Status: DISCONTINUED | OUTPATIENT
Start: 2022-11-21 | End: 2022-11-21

## 2022-11-21 RX ORDER — DEXAMETHASONE SODIUM PHOSPHATE 4 MG/ML
VIAL (ML) INJECTION AS NEEDED
Status: DISCONTINUED | OUTPATIENT
Start: 2022-11-21 | End: 2022-11-21 | Stop reason: SURG

## 2022-11-21 RX ORDER — MORPHINE SULFATE 4 MG/ML
4 INJECTION, SOLUTION INTRAMUSCULAR; INTRAVENOUS EVERY 10 MIN PRN
Status: DISCONTINUED | OUTPATIENT
Start: 2022-11-21 | End: 2022-11-21

## 2022-11-21 RX ORDER — NALOXONE HYDROCHLORIDE 0.4 MG/ML
80 INJECTION, SOLUTION INTRAMUSCULAR; INTRAVENOUS; SUBCUTANEOUS AS NEEDED
Status: DISCONTINUED | OUTPATIENT
Start: 2022-11-21 | End: 2022-11-21

## 2022-11-21 RX ORDER — MORPHINE SULFATE 4 MG/ML
2 INJECTION, SOLUTION INTRAMUSCULAR; INTRAVENOUS EVERY 10 MIN PRN
Status: DISCONTINUED | OUTPATIENT
Start: 2022-11-21 | End: 2022-11-21

## 2022-11-21 RX ADMIN — GLYCOPYRROLATE 0.2 MG: 0.2 INJECTION, SOLUTION INTRAMUSCULAR; INTRAVENOUS at 16:19:00

## 2022-11-21 RX ADMIN — SODIUM CHLORIDE, SODIUM LACTATE, POTASSIUM CHLORIDE, CALCIUM CHLORIDE: 600; 310; 30; 20 INJECTION, SOLUTION INTRAVENOUS at 15:04:00

## 2022-11-21 RX ADMIN — ONDANSETRON 4 MG: 2 INJECTION INTRAMUSCULAR; INTRAVENOUS at 16:46:00

## 2022-11-21 RX ADMIN — GLYCOPYRROLATE 0.2 MG: 0.2 INJECTION, SOLUTION INTRAMUSCULAR; INTRAVENOUS at 15:16:00

## 2022-11-21 RX ADMIN — EPHEDRINE SULFATE 10 MG: 50 INJECTION, SOLUTION INTRAVENOUS at 15:54:00

## 2022-11-21 RX ADMIN — CEFAZOLIN SODIUM/WATER 2 G: 2 G/20 ML SYRINGE (ML) INTRAVENOUS at 15:19:00

## 2022-11-21 RX ADMIN — LIDOCAINE HYDROCHLORIDE 50 MG: 10 INJECTION, SOLUTION EPIDURAL; INFILTRATION; INTRACAUDAL; PERINEURAL at 15:07:00

## 2022-11-21 RX ADMIN — DEXAMETHASONE SODIUM PHOSPHATE 4 MG: 4 MG/ML VIAL (ML) INJECTION at 15:16:00

## 2022-11-21 RX ADMIN — EPHEDRINE SULFATE 10 MG: 50 INJECTION, SOLUTION INTRAVENOUS at 15:25:00

## 2022-11-21 RX ADMIN — PHENYLEPHRINE HCL 100 MCG: 10 MG/ML VIAL (ML) INJECTION at 16:02:00

## 2022-11-21 RX ADMIN — ROCURONIUM BROMIDE 30 MG: 10 INJECTION, SOLUTION INTRAVENOUS at 15:14:00

## 2022-11-21 RX ADMIN — ROCURONIUM BROMIDE 10 MG: 10 INJECTION, SOLUTION INTRAVENOUS at 16:12:00

## 2022-11-21 RX ADMIN — LIDOCAINE HYDROCHLORIDE 50 MG: 10 INJECTION, SOLUTION EPIDURAL; INFILTRATION; INTRACAUDAL; PERINEURAL at 15:14:00

## 2022-11-21 RX ADMIN — ROCURONIUM BROMIDE 10 MG: 10 INJECTION, SOLUTION INTRAVENOUS at 15:07:00

## 2022-11-21 RX ADMIN — ROCURONIUM BROMIDE 10 MG: 10 INJECTION, SOLUTION INTRAVENOUS at 16:17:00

## 2022-11-21 NOTE — INTERVAL H&P NOTE
Pre-op Diagnosis: Chronic ethmoidal sinusitis [J32.2]  Chronic maxillary sinusitis [J32.0]  Nose polyp [J33.9]    The above referenced H&P was reviewed by Army Alicia MD on 11/21/2022, the patient was examined and no significant changes have occurred in the patient's condition since the H&P was performed. I discussed with the patient and/or legal representative the potential benefits, risks and side effects of this procedure; the likelihood of the patient achieving goals; and potential problems that might occur during recuperation. I discussed reasonable alternatives to the procedure, including risks, benefits and side effects related to the alternatives and risks related to not receiving this procedure. We will proceed with procedure as planned.

## 2022-11-21 NOTE — BRIEF OP NOTE
Pre-Operative Diagnosis: Chronic ethmoidal sinusitis [J32.2]  Chronic maxillary sinusitis [J32.0]  Nose polyp [J33.9]     Post-Operative Diagnosis: Chronic ethmoidal sinusitis [J32. 2]Chronic maxillary sinusitis [H65. 0]Nose polyp [J33.9]      Procedure Performed:   Bilateral endoscopic maxillary antrostomy, endoscopic total ethmoidectomy, endoscopic nasal polypectomy    Surgeon(s) and Role:     * Luisa Bolton MD - Primary    Assistant(s):        Surgical Findings:  Left septal deviation and extensive nasal and sinus polyps     Specimen: nasal and sinus polyps     Estimated Blood Loss: 20 ml    Dictation Number:  [id number]    Raeann MD Amarilis  11/21/2022  5:15 PM

## 2022-11-21 NOTE — ANESTHESIA POSTPROCEDURE EVALUATION
Patient: Randy Vieira    Procedure Summary     Date: 11/21/22 Room / Location: [facility] OR  / [facility] OR    Anesthesia Start: 5779 Anesthesia Stop: 4402    Procedure: Bilateral endoscopic maxillary antrostomy, endoscopic total ethmoidectomy, endoscopic nasal polypectomy (Bilateral) Diagnosis:       Chronic ethmoidal sinusitis      Chronic maxillary sinusitis      Nose polyp      (Chronic ethmoidal sinusitis [J32. 2]Chronic maxillary sinusitis [X52. 0]Nose polyp [J33.9])    Surgeons: Candance Berber, MD Anesthesiologist: Freida George DO    Anesthesia Type: general ASA Status: 3          Anesthesia Type: general    Vitals Value Taken Time   /62 11/21/22 1710   Temp 98.5 11/21/22 1714   Pulse 83 11/21/22 1713   Resp 17 11/21/22 1713   SpO2 95 % 11/21/22 1713   Vitals shown include unvalidated device data.     [facility] Post Evaluation:   Patient Evaluated in PACU  Patient Participation: complete - patient participated  Level of Consciousness: awake  Pain Score: 0  Pain Management: adequate  Airway Patency:patent  Dental exam unchanged from preop  Yes    Cardiovascular Status: hemodynamically stable  Respiratory Status: spontaneous ventilation, nasal cannula, airway suctioned, oral airway, unassisted and nonlabored ventilation  Postoperative Hydration stable      Ifeoma Willams DO  11/21/2022 5:14 PM

## 2022-11-21 NOTE — ANESTHESIA PROCEDURE NOTES
Airway  Date/Time: 11/21/2022 3:09 PM  Urgency: Elective    Airway not difficult    General Information and Staff    Patient location during procedure: OR  Anesthesiologist: Nadia Bhatia DO  Performed: anesthesiologist     Indications and Patient Condition  Indications for airway management: anesthesia  Spontaneous Ventilation: absent  Sedation level: deep  Preoxygenated: yes  Patient position: sniffing  Mask difficulty assessment: 1 - vent by mask    Final Airway Details  Final airway type: endotracheal airway      Successful airway: ETT  Cuffed: yes   Successful intubation technique: direct laryngoscopy  Facilitating devices/methods: intubating stylet  Endotracheal tube insertion site: oral  Blade: Donny  Blade size: #4  ETT size (mm): 7.5    Cormack-Lehane Classification: grade IIB - view of arytenoids or posterior of glottis only  Placement verified by: chest auscultation and capnometry   Measured from: teeth  ETT to teeth (cm): 22  Number of attempts at approach: 1

## 2022-11-22 ENCOUNTER — OFFICE VISIT (OUTPATIENT)
Dept: OTOLARYNGOLOGY | Facility: CLINIC | Age: 72
End: 2022-11-22
Payer: MEDICARE

## 2022-11-22 VITALS — HEIGHT: 73 IN | BODY MASS INDEX: 29.41 KG/M2 | TEMPERATURE: 98 F | WEIGHT: 221.88 LBS

## 2022-11-22 DIAGNOSIS — J33.9 NASAL POLYPS: Primary | ICD-10-CM

## 2022-11-22 DIAGNOSIS — J32.4 CHRONIC PANSINUSITIS: ICD-10-CM

## 2022-11-22 NOTE — PATIENT INSTRUCTIONS
Bilateral nasal packing was removed. No nose blowing for 1 week's time. No aspirin for 1 week's time. Follow-up in 2 weeks time, sooner if problems. Finish antibiotics. Take pain medicine as needed. Call with any problems.

## 2022-11-22 NOTE — OPERATIVE REPORT
[facility]    PATIENT'S NAME: Lynnette Bosworth. ATTENDING PHYSICIAN: Kenny Forbes MD   OPERATING PHYSICIAN: Kenny Forbes MD   PATIENT ACCOUNT#:   [de-identified]    LOCATION:  [address]  MEDICAL RECORD #:   [id number]       YOB: 1950  ADMISSION DATE:       11/21/2022      OPERATION DATE:  11/21/2022    OPERATIVE REPORT      PREOPERATIVE DIAGNOSIS:  Extensive nasal and sinus polyps. POSTOPERATIVE DIAGNOSIS:  Extensive nasal and sinus polyps. PROCEDURE:  Bilateral endoscopic maxillary antrostomy, anterior and posterior ethmoidectomies, and nasal polypectomy. ANESTHESIA:  General by oral endotracheal tube. ESTIMATED BLOOD LOSS:  20 mL. COMPLICATIONS:  None. SPECIMENS:  Nasal and sinus polyps. INDICATIONS:  This is a 44-year-old male who has had a history of nasal polyps. He has had endoscopic sinus surgery in the past.  He presents with extensive disease despite systemic and topical steroids as well as intermittent antibiotic usage. For the above-mentioned reason, the procedure was indicated. OPERATIVE TECHNIQUE:  The patient was taken to the operating room suite, placed under general anesthesia and an oral endotracheal tube was placed. The patient was sterilely prepped and draped. A total of 1.5 mL of 1% lidocaine with 1:100,000 epinephrine was infiltrated into the bilateral sphenopalatine foramen. An additional 4 mL of 1% lidocaine with 1:100,000 epinephrine was infiltrated into the bilateral nares. Four cotton pledgets with 4% cocaine were also placed to the bilateral nares. After this was done, attention was directed toward the right side. The nasal polyps were removed with a Eleanor forceps. They were removed sharply along the cribriform plate. The polyps were also removed from the ethmoid, and the maxillary antrostomy site was identified and widened. After this was done, attention was directed toward the other side.   There was a left septal deviation on the opposite side. The polyps were once again removed from the nose sharply superiorly and from the entire ethmoid sinus. There was a lot of scarring along the lateral wall, but the os of the maxillary sinus was identified and widened. After this was done on both sides, the Propel implants were placed followed by standard Merocel packing. The mouth and stomach were fully suctioned. The patient tolerated the procedure well. He was extubated in the operating room suite and taken to recovery room in good condition. His vision and extraocular motion was intact.       Dictated By Jaclyn Kamara MD  d: 11/21/2022 17:19:23  t: 11/21/2022 20:40:30  [facility] [id number]  GQL/

## 2022-11-22 NOTE — PROGRESS NOTES
Postoperative day #1. Patient status post nasal polypectomy, total ethmoidectomy, and maxillary antrostomies. No complaints of  Physical examination:   Bilateral Merocel packing removed. No bleeding anteriorly or posteriorly. Impression: Healing as expected. Plan: no nose blowing for 1 week's time, no aspirin for 1 week's time, follow-up in 2 weeks time, sooner if problems.

## 2022-12-06 ENCOUNTER — OFFICE VISIT (OUTPATIENT)
Dept: OTOLARYNGOLOGY | Facility: CLINIC | Age: 72
End: 2022-12-06
Payer: MEDICARE

## 2022-12-06 VITALS — HEIGHT: 73 IN | BODY MASS INDEX: 29.41 KG/M2 | WEIGHT: 221.88 LBS | TEMPERATURE: 98 F

## 2022-12-06 DIAGNOSIS — J33.9 NASAL POLYPS: Primary | ICD-10-CM

## 2022-12-06 DIAGNOSIS — J32.4 CHRONIC PANSINUSITIS: ICD-10-CM

## 2022-12-06 PROCEDURE — 99024 POSTOP FOLLOW-UP VISIT: CPT | Performed by: SPECIALIST

## 2022-12-06 NOTE — PROGRESS NOTES
*Operative day #15  Status post endoscopic nasal polypectomy, ethmoidectomy, and maxillary antrostomies. No complaints however reported right propel follow-up from the nose. This occurred with sneezing. Physical examination:  Ears= normal  Nose= right septal deviation. No polyps seen on either side. No sinusitis seen. Mouth= normal  Neck= no adenopathy  Impression: Healing nicely status post endoscopic bilateral nasal polypectomy, ethmoidectomy, and maxillary antrostomies. Right propel implant fill out to the nose with sneezing. Plan: Restart Flonase. Follow-up in 6 weeks time, sooner if problems.

## 2022-12-06 NOTE — PATIENT INSTRUCTIONS
Your nose is patent. As the right propel implant has fallen out, restart the Flonase. Follow-up in 6 weeks time, sooner if problems.

## 2023-01-17 ENCOUNTER — OFFICE VISIT (OUTPATIENT)
Dept: OTOLARYNGOLOGY | Facility: CLINIC | Age: 73
End: 2023-01-17

## 2023-01-17 VITALS — HEIGHT: 73 IN | TEMPERATURE: 98 F | WEIGHT: 221.88 LBS | BODY MASS INDEX: 29.41 KG/M2

## 2023-01-17 DIAGNOSIS — J32.4 CHRONIC PANSINUSITIS: ICD-10-CM

## 2023-01-17 DIAGNOSIS — J33.9 NASAL POLYPS: Primary | ICD-10-CM

## 2023-01-17 PROCEDURE — 99213 OFFICE O/P EST LOW 20 MIN: CPT | Performed by: SPECIALIST

## 2023-01-17 NOTE — PATIENT INSTRUCTIONS
No evidence of recurrent sinusitis or polyps 2 months post op. Continue Flonase. Follow-up in 3 additional months time, sooner if problems.

## 2023-02-16 ENCOUNTER — OFFICE VISIT (OUTPATIENT)
Dept: INTERNAL MEDICINE CLINIC | Facility: CLINIC | Age: 73
End: 2023-02-16

## 2023-02-16 VITALS
WEIGHT: 217.25 LBS | HEIGHT: 73 IN | HEART RATE: 78 BPM | SYSTOLIC BLOOD PRESSURE: 129 MMHG | BODY MASS INDEX: 28.79 KG/M2 | DIASTOLIC BLOOD PRESSURE: 70 MMHG

## 2023-02-16 DIAGNOSIS — J01.90 ACUTE SINUSITIS, RECURRENCE NOT SPECIFIED, UNSPECIFIED LOCATION: Primary | ICD-10-CM

## 2023-02-16 DIAGNOSIS — I10 BENIGN HYPERTENSION: ICD-10-CM

## 2023-02-16 DIAGNOSIS — K59.00 CONSTIPATION, UNSPECIFIED CONSTIPATION TYPE: ICD-10-CM

## 2023-02-16 PROCEDURE — 99214 OFFICE O/P EST MOD 30 MIN: CPT | Performed by: INTERNAL MEDICINE

## 2023-02-16 RX ORDER — CEFDINIR 300 MG/1
300 CAPSULE ORAL 2 TIMES DAILY
Qty: 20 CAPSULE | Refills: 0 | Status: SHIPPED | OUTPATIENT
Start: 2023-02-16

## 2023-02-16 RX ORDER — ASPIRIN 81 MG
81 TABLET,CHEWABLE ORAL DAILY
COMMUNITY
Start: 2023-01-10

## 2023-02-27 ENCOUNTER — TELEPHONE (OUTPATIENT)
Dept: OTOLARYNGOLOGY | Facility: CLINIC | Age: 73
End: 2023-02-27

## 2023-02-27 NOTE — TELEPHONE ENCOUNTER
Per pt saw his PCP for wheezing and issue has not resolved with antibiotics. Would like to be seen by Dr. Marilou Porter in any location. Offered first available (mid March) and pt asking to be seen sooner.  Please advise

## 2023-03-02 ENCOUNTER — OFFICE VISIT (OUTPATIENT)
Dept: OTOLARYNGOLOGY | Facility: CLINIC | Age: 73
End: 2023-03-02

## 2023-03-02 ENCOUNTER — HOSPITAL ENCOUNTER (OUTPATIENT)
Dept: GENERAL RADIOLOGY | Facility: HOSPITAL | Age: 73
Discharge: HOME | End: 2023-03-02
Attending: SPECIALIST
Payer: MEDICARE

## 2023-03-02 VITALS — BODY MASS INDEX: 28.79 KG/M2 | TEMPERATURE: 97 F | HEIGHT: 73 IN | WEIGHT: 217.25 LBS

## 2023-03-02 DIAGNOSIS — R05.1 ACUTE COUGH: ICD-10-CM

## 2023-03-02 DIAGNOSIS — J32.4 CHRONIC PANSINUSITIS: ICD-10-CM

## 2023-03-02 DIAGNOSIS — R05.1 ACUTE COUGH: Primary | ICD-10-CM

## 2023-03-02 DIAGNOSIS — J33.9 NASAL POLYPS: ICD-10-CM

## 2023-03-02 PROCEDURE — 99213 OFFICE O/P EST LOW 20 MIN: CPT | Performed by: SPECIALIST

## 2023-03-02 PROCEDURE — 71046 X-RAY EXAM CHEST 2 VIEWS: CPT | Performed by: SPECIALIST

## 2023-03-02 RX ORDER — MONTELUKAST SODIUM 10 MG/1
10 TABLET ORAL NIGHTLY
Qty: 30 TABLET | Refills: 5 | Status: SHIPPED | OUTPATIENT
Start: 2023-03-02

## 2023-03-02 NOTE — PATIENT INSTRUCTIONS
You had a new onset cough. Tried Omnicef without resolution of his symptoms. I ordered a chest x-ray and placed you on a trial of Singulair. Please call or message me in 3 weeks time to let me know if this is helpful to you. We did discuss that in a small number of people this could cause some depression. If this is the case, please stop it immediately.

## 2023-03-09 ENCOUNTER — OFFICE VISIT (OUTPATIENT)
Dept: INTERNAL MEDICINE CLINIC | Facility: CLINIC | Age: 73
End: 2023-03-09

## 2023-03-09 VITALS
WEIGHT: 213 LBS | HEIGHT: 73 IN | HEART RATE: 97 BPM | BODY MASS INDEX: 28.23 KG/M2 | OXYGEN SATURATION: 94 % | SYSTOLIC BLOOD PRESSURE: 130 MMHG | DIASTOLIC BLOOD PRESSURE: 68 MMHG

## 2023-03-09 DIAGNOSIS — R97.20 ELEVATED PSA: ICD-10-CM

## 2023-03-09 DIAGNOSIS — E78.00 HYPERCHOLESTEREMIA: ICD-10-CM

## 2023-03-09 DIAGNOSIS — K59.00 CONSTIPATION, UNSPECIFIED CONSTIPATION TYPE: Primary | ICD-10-CM

## 2023-03-09 DIAGNOSIS — I10 BENIGN HYPERTENSION: ICD-10-CM

## 2023-03-09 PROCEDURE — 99214 OFFICE O/P EST MOD 30 MIN: CPT | Performed by: INTERNAL MEDICINE

## 2023-03-09 PROCEDURE — 1126F AMNT PAIN NOTED NONE PRSNT: CPT | Performed by: INTERNAL MEDICINE

## 2023-03-09 RX ORDER — LACTULOSE 10 G/15ML
20 SOLUTION ORAL 2 TIMES DAILY
Qty: 946 ML | Refills: 5 | Status: SHIPPED | OUTPATIENT
Start: 2023-03-09

## 2023-03-15 ENCOUNTER — TELEPHONE (OUTPATIENT)
Dept: INTERNAL MEDICINE CLINIC | Facility: CLINIC | Age: 73
End: 2023-03-15

## 2023-03-15 NOTE — TELEPHONE ENCOUNTER
Patient called as per Dr Gabriela Andrade  He is feeling a bit better today  Patient will call back tomorrow to check in   Tasked to nursing

## 2023-03-16 NOTE — TELEPHONE ENCOUNTER
Pt taking lactulose as prescribed. Not taking any other OTCs. Had a few small, hard bowel movements yesterday. Had one small loose BM this morning. Feels as if \"things are moving. \" Passing gas. No nausea/vomiting, appetite is good. No abd pain. No bloody or black/tarry stools. Pt scheduled f/up with Dr. Liu Lorenzo tomorrow morning. Advised pt call back with any questions/concerns/worsening symptoms.

## 2023-03-17 ENCOUNTER — OFFICE VISIT (OUTPATIENT)
Dept: INTERNAL MEDICINE CLINIC | Facility: CLINIC | Age: 73
End: 2023-03-17

## 2023-03-17 VITALS
HEART RATE: 74 BPM | BODY MASS INDEX: 28.23 KG/M2 | WEIGHT: 213 LBS | TEMPERATURE: 98 F | OXYGEN SATURATION: 98 % | HEIGHT: 73 IN | DIASTOLIC BLOOD PRESSURE: 72 MMHG | SYSTOLIC BLOOD PRESSURE: 124 MMHG

## 2023-03-17 DIAGNOSIS — K59.00 CONSTIPATION, UNSPECIFIED CONSTIPATION TYPE: Primary | ICD-10-CM

## 2023-03-17 DIAGNOSIS — I10 BENIGN HYPERTENSION: ICD-10-CM

## 2023-03-17 DIAGNOSIS — E78.00 HYPERCHOLESTEREMIA: ICD-10-CM

## 2023-03-17 PROCEDURE — 1126F AMNT PAIN NOTED NONE PRSNT: CPT | Performed by: INTERNAL MEDICINE

## 2023-03-17 PROCEDURE — 99213 OFFICE O/P EST LOW 20 MIN: CPT | Performed by: INTERNAL MEDICINE

## 2023-04-18 ENCOUNTER — OFFICE VISIT (OUTPATIENT)
Dept: OTOLARYNGOLOGY | Facility: CLINIC | Age: 73
End: 2023-04-18

## 2023-04-18 VITALS — HEIGHT: 73 IN | WEIGHT: 213 LBS | TEMPERATURE: 98 F | BODY MASS INDEX: 28.23 KG/M2

## 2023-04-18 DIAGNOSIS — H93.12 LEFT-SIDED TINNITUS: Primary | ICD-10-CM

## 2023-04-18 DIAGNOSIS — J32.4 CHRONIC PANSINUSITIS: ICD-10-CM

## 2023-04-18 DIAGNOSIS — J33.9 NASAL POLYPS: ICD-10-CM

## 2023-04-18 PROCEDURE — 99213 OFFICE O/P EST LOW 20 MIN: CPT | Performed by: SPECIALIST

## 2023-04-18 RX ORDER — MONTELUKAST SODIUM 10 MG/1
10 TABLET ORAL NIGHTLY
Qty: 90 TABLET | Refills: 3 | Status: SHIPPED | OUTPATIENT
Start: 2023-04-18

## 2023-04-18 NOTE — PATIENT INSTRUCTIONS
An audiogram was ordered for your tinnitus. Continue Singulair and Flonase for your nasal polyposis and allergic rhinitis. Follow-up in 4 months time, sooner if problems.

## 2023-08-01 ENCOUNTER — TELEPHONE (OUTPATIENT)
Dept: INTERNAL MEDICINE CLINIC | Facility: CLINIC | Age: 73
End: 2023-08-01

## 2023-08-01 NOTE — TELEPHONE ENCOUNTER
Patient came to office and dropped of Persons with Disabilities Certification for Parking Placard to be filled out. Patient wants to  form when complete.   Please call patient  when ready,    Ph# [phone]    Placed in Dr Jl Barber mail box

## 2023-08-08 NOTE — TELEPHONE ENCOUNTER
Patient is came in the office and was checking on the status of the parking placard. Patient dropped off the placard on 8/1/2023. Patient was hoping to get the placard tomorrow 8/9/23 by 1pm.    Please call when ready for pickup.

## 2023-08-22 ENCOUNTER — OFFICE VISIT (OUTPATIENT)
Dept: OTOLARYNGOLOGY | Facility: CLINIC | Age: 73
End: 2023-08-22

## 2023-08-22 VITALS — BODY MASS INDEX: 28.23 KG/M2 | HEIGHT: 73 IN | WEIGHT: 213 LBS

## 2023-08-22 DIAGNOSIS — J33.9 NASAL POLYPS: Primary | ICD-10-CM

## 2023-08-22 DIAGNOSIS — J32.4 CHRONIC PANSINUSITIS: ICD-10-CM

## 2023-08-22 PROCEDURE — 99213 OFFICE O/P EST LOW 20 MIN: CPT | Performed by: SPECIALIST

## 2023-08-22 NOTE — PATIENT INSTRUCTIONS
No polyps seen on the left and very tiny polyp seen on the right. Increase Flonase to 3 sprays to each nostril daily. Follow-up in 4 months time, sooner if problems.

## 2023-09-21 RX ORDER — AMLODIPINE BESYLATE 10 MG/1
10 TABLET ORAL DAILY
Qty: 90 TABLET | Refills: 0 | Status: SHIPPED | OUTPATIENT
Start: 2023-09-21

## 2023-09-21 RX ORDER — ATORVASTATIN CALCIUM 40 MG/1
40 TABLET, FILM COATED ORAL DAILY
Qty: 90 TABLET | Refills: 0 | Status: SHIPPED | OUTPATIENT
Start: 2023-09-21

## 2023-09-21 NOTE — TELEPHONE ENCOUNTER
Refill request is for a maintenance medication and has met the criteria specified in the Ambulatory Medication Refill Standing Order for eligibility, visits, laboratory, alerts and was sent to the requested pharmacy. Requested Prescriptions     Signed Prescriptions Disp Refills    AMLODIPINE 10 MG Oral Tab 90 tablet 0     Sig: Take 1 tablet (10 mg total) by mouth daily. Authorizing Provider: Pearl Chung     Ordering User: London Hernandez    ATORVASTATIN 40 MG Oral Tab 90 tablet 0     Sig: Take 1 tablet (40 mg total) by mouth daily. Authorizing Provider:  Pearl Chung     Ordering User: London Hernandez     Msg sent with refills that pt due to PE

## 2023-10-24 ENCOUNTER — HOSPITAL ENCOUNTER (OUTPATIENT)
Age: 73
Discharge: HOME | End: 2023-10-24

## 2023-10-24 VITALS
RESPIRATION RATE: 18 BRPM | HEART RATE: 64 BPM | TEMPERATURE: 99 F | OXYGEN SATURATION: 98 % | SYSTOLIC BLOOD PRESSURE: 136 MMHG | DIASTOLIC BLOOD PRESSURE: 61 MMHG

## 2023-10-24 DIAGNOSIS — J02.9 VIRAL PHARYNGITIS: Primary | ICD-10-CM

## 2023-10-24 LAB — S PYO AG THROAT QL: NEGATIVE

## 2023-10-24 PROCEDURE — 99213 OFFICE O/P EST LOW 20 MIN: CPT | Performed by: NURSE PRACTITIONER

## 2023-10-24 PROCEDURE — 87880 STREP A ASSAY W/OPTIC: CPT | Performed by: NURSE PRACTITIONER

## 2023-10-24 NOTE — DISCHARGE INSTRUCTIONS
Strep test is negative. No indication for antibiotics. Tylenol as needed for pain. Salt water gargles. Flonase nasal spray. Throat lozenges.  Follow up with Dr Lorenzo Garrison for further evaluation

## 2023-10-31 ENCOUNTER — TELEPHONE (OUTPATIENT)
Dept: INTERNAL MEDICINE CLINIC | Facility: CLINIC | Age: 73
End: 2023-10-31

## 2023-10-31 DIAGNOSIS — Z00.00 ANNUAL PHYSICAL EXAM: Primary | ICD-10-CM

## 2023-10-31 DIAGNOSIS — R97.20 ELEVATED PSA: ICD-10-CM

## 2023-10-31 DIAGNOSIS — I10 BENIGN HYPERTENSION: ICD-10-CM

## 2023-10-31 DIAGNOSIS — E78.00 HYPERCHOLESTEREMIA: ICD-10-CM

## 2023-10-31 NOTE — TELEPHONE ENCOUNTER
Patient calling to request blood work prior to:    [x] physical    [] check-up      [] other    Patient uses:  [x] [facility] [] [facility]       [facility] location:       Fax #:    Patient prefers to be notified once labs are placed by: [] phone call  [x] my chart message     Pt.  Has his MA scheduled for

## 2023-11-03 ENCOUNTER — LAB ENCOUNTER (OUTPATIENT)
Dept: LAB | Age: 73
End: 2023-11-03
Attending: INTERNAL MEDICINE
Payer: MEDICARE

## 2023-11-03 DIAGNOSIS — R97.20 ELEVATED PSA: ICD-10-CM

## 2023-11-03 LAB
ALBUMIN SERPL-MCNC: 4.4 G/DL (ref 3.2–4.8)
ALBUMIN/GLOB SERPL: 1.3 {RATIO} (ref 1–2)
ALP LIVER SERPL-CCNC: 70 U/L
ALT SERPL-CCNC: 23 U/L
ANION GAP SERPL CALC-SCNC: 9 MMOL/L (ref 0–18)
AST SERPL-CCNC: 23 U/L (ref ?–34)
BASOPHILS # BLD AUTO: 0.04 X10(3) UL (ref 0–0.2)
BASOPHILS NFR BLD AUTO: 0.4 %
BILIRUB SERPL-MCNC: 0.8 MG/DL (ref 0.2–1.1)
BUN BLD-MCNC: 23 MG/DL (ref 9–23)
BUN/CREAT SERPL: 22.5 (ref 10–20)
CALCIUM BLD-MCNC: 9.5 MG/DL (ref 8.7–10.4)
CHLORIDE SERPL-SCNC: 104 MMOL/L (ref 98–112)
CHOLEST SERPL-MCNC: 129 MG/DL (ref ?–200)
CO2 SERPL-SCNC: 29 MMOL/L (ref 21–32)
CREAT BLD-MCNC: 1.02 MG/DL
DEPRECATED RDW RBC AUTO: 41.5 FL (ref 35.1–46.3)
EGFRCR SERPLBLD CKD-EPI 2021: 79 ML/MIN/1.73M2 (ref 60–?)
EOSINOPHIL # BLD AUTO: 0.35 X10(3) UL (ref 0–0.7)
EOSINOPHIL NFR BLD AUTO: 3.7 %
ERYTHROCYTE [DISTWIDTH] IN BLOOD BY AUTOMATED COUNT: 12.8 % (ref 11–15)
FASTING PATIENT LIPID ANSWER: YES
FASTING STATUS PATIENT QL REPORTED: YES
GLOBULIN PLAS-MCNC: 3.5 G/DL (ref 2.8–4.4)
GLUCOSE BLD-MCNC: 105 MG/DL (ref 70–99)
HCT VFR BLD AUTO: 44.3 %
HDLC SERPL-MCNC: 35 MG/DL (ref 40–59)
HGB BLD-MCNC: 14.4 G/DL
IMM GRANULOCYTES # BLD AUTO: 0.01 X10(3) UL (ref 0–1)
IMM GRANULOCYTES NFR BLD: 0.1 %
LDLC SERPL CALC-MCNC: 73 MG/DL (ref ?–100)
LYMPHOCYTES # BLD AUTO: 1.99 X10(3) UL (ref 1–4)
LYMPHOCYTES NFR BLD AUTO: 21.2 %
MCH RBC QN AUTO: 28.7 PG (ref 26–34)
MCHC RBC AUTO-ENTMCNC: 32.5 G/DL (ref 31–37)
MCV RBC AUTO: 88.2 FL
MONOCYTES # BLD AUTO: 0.57 X10(3) UL (ref 0.1–1)
MONOCYTES NFR BLD AUTO: 6.1 %
NEUTROPHILS # BLD AUTO: 6.41 X10 (3) UL (ref 1.5–7.7)
NEUTROPHILS # BLD AUTO: 6.41 X10(3) UL (ref 1.5–7.7)
NEUTROPHILS NFR BLD AUTO: 68.5 %
NONHDLC SERPL-MCNC: 94 MG/DL (ref ?–130)
OSMOLALITY SERPL CALC.SUM OF ELEC: 298 MOSM/KG (ref 275–295)
PLATELET # BLD AUTO: 322 10(3)UL (ref 150–450)
POTASSIUM SERPL-SCNC: 4.2 MMOL/L (ref 3.5–5.1)
PROT SERPL-MCNC: 7.9 G/DL (ref 5.7–8.2)
PSA FREE MFR SERPL: 19 %
PSA FREE SERPL-MCNC: 1.14 NG/ML
PSA SERPL-MCNC: 6.02 NG/ML (ref ?–4)
RBC # BLD AUTO: 5.02 X10(6)UL
SODIUM SERPL-SCNC: 142 MMOL/L (ref 136–145)
TRIGL SERPL-MCNC: 117 MG/DL (ref 30–149)
TSI SER-ACNC: 1.69 MIU/ML (ref 0.55–4.78)
VLDLC SERPL CALC-MCNC: 18 MG/DL (ref 0–30)
WBC # BLD AUTO: 9.4 X10(3) UL (ref 4–11)

## 2023-11-03 PROCEDURE — 84153 ASSAY OF PSA TOTAL: CPT

## 2023-11-03 PROCEDURE — 84154 ASSAY OF PSA FREE: CPT

## 2023-11-03 PROCEDURE — 80053 COMPREHEN METABOLIC PANEL: CPT | Performed by: INTERNAL MEDICINE

## 2023-11-03 PROCEDURE — 36415 COLL VENOUS BLD VENIPUNCTURE: CPT | Performed by: INTERNAL MEDICINE

## 2023-11-03 PROCEDURE — 84443 ASSAY THYROID STIM HORMONE: CPT | Performed by: INTERNAL MEDICINE

## 2023-11-03 PROCEDURE — 85025 COMPLETE CBC W/AUTO DIFF WBC: CPT | Performed by: INTERNAL MEDICINE

## 2023-11-03 PROCEDURE — 80061 LIPID PANEL: CPT | Performed by: INTERNAL MEDICINE

## 2023-11-06 ENCOUNTER — OFFICE VISIT (OUTPATIENT)
Dept: INTERNAL MEDICINE CLINIC | Facility: CLINIC | Age: 73
End: 2023-11-06

## 2023-11-06 VITALS
TEMPERATURE: 98 F | HEART RATE: 58 BPM | OXYGEN SATURATION: 98 % | HEIGHT: 73 IN | WEIGHT: 218 LBS | SYSTOLIC BLOOD PRESSURE: 130 MMHG | DIASTOLIC BLOOD PRESSURE: 62 MMHG | BODY MASS INDEX: 28.89 KG/M2

## 2023-11-06 DIAGNOSIS — L23.9 ALLERGIC CONTACT DERMATITIS, UNSPECIFIED TRIGGER: ICD-10-CM

## 2023-11-06 DIAGNOSIS — E78.1 HYPERTRIGLYCERIDEMIA: ICD-10-CM

## 2023-11-06 DIAGNOSIS — Z00.00 ANNUAL PHYSICAL EXAM: Primary | ICD-10-CM

## 2023-11-06 DIAGNOSIS — H93.12 TINNITUS, LEFT EAR: ICD-10-CM

## 2023-11-06 DIAGNOSIS — I87.2 CHRONIC VENOUS INSUFFICIENCY: ICD-10-CM

## 2023-11-06 DIAGNOSIS — Z87.09 HISTORY OF SINUSITIS: ICD-10-CM

## 2023-11-06 DIAGNOSIS — J33.9 NASAL POLYPOSIS: ICD-10-CM

## 2023-11-06 DIAGNOSIS — D12.6 ADENOMATOUS POLYP OF COLON, UNSPECIFIED PART OF COLON: ICD-10-CM

## 2023-11-06 DIAGNOSIS — I83.93 VARICOSE VEINS OF BOTH LOWER EXTREMITIES, UNSPECIFIED WHETHER COMPLICATED: ICD-10-CM

## 2023-11-06 DIAGNOSIS — E78.00 HYPERCHOLESTEREMIA: ICD-10-CM

## 2023-11-06 DIAGNOSIS — I10 BENIGN HYPERTENSION: ICD-10-CM

## 2023-11-06 DIAGNOSIS — K59.00 CONSTIPATION, UNSPECIFIED CONSTIPATION TYPE: ICD-10-CM

## 2023-11-06 DIAGNOSIS — R97.20 ELEVATED PSA: ICD-10-CM

## 2023-11-06 DIAGNOSIS — B35.3 TINEA PEDIS, UNSPECIFIED LATERALITY: ICD-10-CM

## 2023-11-06 DIAGNOSIS — M20.60 DEFORMITY OF TOE, UNSPECIFIED LATERALITY: ICD-10-CM

## 2023-11-06 PROCEDURE — 99214 OFFICE O/P EST MOD 30 MIN: CPT | Performed by: INTERNAL MEDICINE

## 2023-11-06 PROCEDURE — 90662 IIV NO PRSV INCREASED AG IM: CPT | Performed by: INTERNAL MEDICINE

## 2023-11-06 PROCEDURE — G0008 ADMIN INFLUENZA VIRUS VAC: HCPCS | Performed by: INTERNAL MEDICINE

## 2023-11-06 PROCEDURE — G0439 PPPS, SUBSEQ VISIT: HCPCS | Performed by: INTERNAL MEDICINE

## 2023-11-06 PROCEDURE — 1126F AMNT PAIN NOTED NONE PRSNT: CPT | Performed by: INTERNAL MEDICINE

## 2024-01-04 RX ORDER — ATORVASTATIN CALCIUM 40 MG/1
40 TABLET, FILM COATED ORAL DAILY
Qty: 90 TABLET | Refills: 3 | Status: SHIPPED | OUTPATIENT
Start: 2024-01-04

## 2024-01-04 NOTE — TELEPHONE ENCOUNTER
Refill request is for a maintenance medication and has met the criteria specified in the Ambulatory Medication Refill Standing Order for eligibility, visits, laboratory, alerts and was sent to the requested pharmacy.    Requested Prescriptions     Signed Prescriptions Disp Refills    atorvastatin 40 MG Oral Tab 90 tablet 3     Sig: Take 1 tablet (40 mg total) by mouth daily     Authorizing Provider: LISA SIEGEL     Ordering User: MATILDA GERONIMO

## 2024-01-15 RX ORDER — AMLODIPINE BESYLATE 10 MG/1
10 TABLET ORAL DAILY
Qty: 90 TABLET | Refills: 3 | Status: SHIPPED | OUTPATIENT
Start: 2024-01-15

## 2024-01-15 NOTE — TELEPHONE ENCOUNTER
Refill request is for a maintenance medication and has met the criteria specified in the Ambulatory Medication Refill Standing Order for eligibility, visits, laboratory, alerts and was sent to the requested pharmacy.    Requested Prescriptions     Signed Prescriptions Disp Refills    amLODIPine 10 MG Oral Tab 90 tablet 3     Sig: Take 1 tablet (10 mg total) by mouth daily     Authorizing Provider: LISA SIEGEL     Ordering User: MATILDA GERONIMO

## 2024-04-02 RX ORDER — MONTELUKAST SODIUM 10 MG/1
10 TABLET ORAL NIGHTLY
Qty: 30 TABLET | Refills: 0 | Status: SHIPPED | OUTPATIENT
Start: 2024-04-02

## 2024-04-02 NOTE — TELEPHONE ENCOUNTER
Refill request for Montelukast sent to pharmacy on 4/2/24, patients last OV 8/2023, Dr. Lind wanted to see him in 4 months or sooner. Only sent in a 30 day supply.

## 2024-04-26 ENCOUNTER — TELEPHONE (OUTPATIENT)
Facility: CLINIC | Age: 74
End: 2024-04-26

## 2024-04-26 DIAGNOSIS — Z86.010 PERSONAL HISTORY OF COLONIC POLYPS: Primary | ICD-10-CM

## 2024-04-29 NOTE — TELEPHONE ENCOUNTER
Called patient for CLN recall  Medications, pharmacy, and allergies verified  **Please advise on colonoscopy and bowel prep orders**.   › Insurance:  MEDICARE  › Last PCP Visit: 11/6/2023  › Last CBC/BMP drawn: 11/3/2023  › H/W/BMI: 6'1/210 lbs/27.7  Special comments/notes:  Recall details:     Last Procedure, Date, MD:    8/10/2021 CLN by Dr ROMAN   Last diagnosis:  Fragments sessile serrated adenoma, tubular adenoma, fragments of tubular adenoma   Recalled for (mth/yrs):  3 yrs (August 2024)   Sedation used previously:  MAC   Last Prep Used:  Miralax/gatorade   Quality of Prep:  good     Telephone colon screening Questionnaires:  Yes No   Are you currently experiencing any new GI symptoms? [] [x]   If yes, symptom details:     Rectal Bleeding with or without bowel movements: [] [x]   Black stool: [] [x]   Dysphagia &Food feeling/getting stuck: [] [x]   Intractable Vomiting: [] [x]   Unexplained weight loss: [] [x]   First colonoscopy? [] [x]   Family history of colon cancer? [] [x]   Any issues with anesthesia? [] [x]   If yes, explain:      Personal history of Resp. Issues/Oxygen Use/CHRISTOPHER/COPD? [] [x]   If yes, CPAP/BiPAP? [] []   History of devices Pacemaker/Defibrillator/Stents? [] [x]   History of Cardiac/CVA issues/(MI/Stroke):  [] [x]     Medication usage:  Yes  No   Anticoagulants:  Anticoagulant (Except Aspirin) ? Route to RN staff to obtain ordering provider orders [] [x]   Diabetic Meds:   PO DM Meds ? hold day prior and day of procedure  Insulin ? Route to RN clinical staff to obtain provider orders  [] [x]   Weight loss meds (Phentermine/Vyvanse/Saxsenda/etc): [] [x]   Iron/Herbal/Multivitamin Supplement (RX/OTC): [] [x]   Usage of marijuana, CBD &/or vape products: [] [x]        Fareed Jackson MD   Physician  Gastroenterology     Operative Report     Signed     Date of Service: 8/10/2021 10:32 AM  Case Time: Procedures: Surgeons:   8/10/2021 10:39 AM COLONOSCOPY    Fareed Jackson MD                Signed         [facility] Endoscopy Report     Date of Procedure:  08/10/21     Preoperative Diagnosis:  Colorectal cancer screening     Postoperative Diagnosis:  1.  Colon polyps  2.  Sigmoid colon diverticulosis     Procedure:    Colonoscopy with polypectomy     Surgeon:  Fareed Jackson M.D.     Anesthesia:  Monitored anesthesia care  Cecal withdrawal time: 24 minutes  EBL:  Insignificant     Brief History:  This is a 71 year old male who presents for his for screening colonoscopy.  He has had no lower gastrointestinal tract symptoms, signs.  He has no first-degree family members with colorectal cancer.     Technique:  After informed consent, the patient was placed in the left lateral recumbent position.  Digital rectal examination revealed no palpable intraluminal abnormalities.  An Olympus variable stiffness 190 series HD colonoscope was inserted into the rectum and advanced under direct vision by following the lumen to the terminal ileum.  The colon was examined upon withdrawal in the left lateral recumbent position.       Findings:  The preparation of the colon was good.  The terminal ileum was examined for 5 cm and visually normal.  The ileocecal valve was well preserved. The visualized colonic mucosa from the cecum to the anal verge was normal with an intact vascular pattern.  There were #4 polyps seen within the colon which removed as follows:     1.  In the proximal ascending colon there were #2 4 mm serrated sessile polyps which were cold snare excised and retrieved.  2.  In the distal transverse colon there was a 2 mm sessile polyp which was removed with a cold biopsy forceps.  3.  In the mid sigmoid colon there was a 4-5 mm sessile polyp which was cold snare excised and retrieved.     Inspection of all sites revealed no evidence of ongoing bleeding.  There were a few small diverticula seen in the sigmoid colon without current signs of complication.  There were no other  colonic polyps, mass lesions, vascular anomalies or signs of inflammation seen.  Retroflexion in the rectum revealed no abnormalities.  The procedure was well tolerated without immediate complication.     Impression:  1.  Diminutive/small colon polyps  2.  Sigmoid colon diverticulosis, currently uncomplicated     Recommendations:  1.  High-fiber diet.  2.  Follow-up biopsy results.  Polyp histology to determine recommendations regarding follow-up.                 Fareed Jackson MD  8/12/2021  7:56 PM CDT       I spoke to Jevon.  He is feeling well.  He had #4 subcentimeter adenomatous polyps removed.  I have discussed the significance.  I have recommended a high-fiber diet for diverticulosis and a surveillance colonoscopy in 3 years.    GI RNs: Please enter colonoscopy recall for 3 years.  TARIQ Aragon          Final Diagnosis:      A. Colon ascending polyp x2:  Fragments of sessile serrated adenoma.     B. Colon transverse polyp x1:  Tubular adenoma.     C. Sigmoid colon polyp x1:  Fragments of tubular adenoma.

## 2024-05-01 NOTE — TELEPHONE ENCOUNTER
May schedule a colonoscopy for a history of colon polyps following a split dose MiraLAX/Gatorade preparation and monitored anesthesia care.

## 2024-05-07 RX ORDER — MONTELUKAST SODIUM 10 MG/1
10 TABLET ORAL NIGHTLY
Qty: 30 TABLET | Refills: 2 | Status: SHIPPED | OUTPATIENT
Start: 2024-05-07

## 2024-05-24 NOTE — TELEPHONE ENCOUNTER
Scheduled for:  Colonoscopy 69575  Provider Name:  Dr. Jackson  Date:  9/5/2024  Location:  [facility]  Sedation:  MAC  Time:  8am, (pt is aware to arrive at 7am)   Prep:  Miralax  Meds/Allergies Reconciled?:  Physician reviewed     Diagnosis with codes:  Hx of colon polyps Z86.010  Was patient informed to call insurance with codes (Y/N):  Yes, I confirmed MEDICARE insurance with this patient.      Referral sent?:  Referral was sent at the time of electronic surgical scheduling.   EM or [facility] notified?:  I sent an electronic request to Endo Scheduling and received a confirmation today.      Medication Orders:  n/a  Misc Orders:  n/a     Further instructions given by staff:   I discussed the prep instructions with the patient which he verbally understood and is aware that I will mail the instructions today.

## 2024-08-16 ENCOUNTER — TELEPHONE (OUTPATIENT)
Facility: CLINIC | Age: 74
End: 2024-08-16

## 2024-08-16 DIAGNOSIS — Z86.010 PERSONAL HISTORY OF COLONIC POLYPS: Primary | ICD-10-CM

## 2024-08-16 NOTE — TELEPHONE ENCOUNTER
Patient is call to reschedule 9/5/24 Colonoscopy.  Patient informed 1-2 week turnaround time for reschedule's.  Please call    Speech Therapy Evaluation    Visit Count: 1  Plan of Care Dates: Initial: 11/13/2019 Through: 2/5/2020  Insurance Information:    [employer]  Insurance Phone #: [phone]  : TONYA  Copay: 0  500 Deductible 500 met  Coinsurance percentage: 90  2000 Out of pocket 72.73 met  Next Referring Provider Visit: Not yet scheduled at this time with ENT. Appointment scheduled with ENT at [facility] 11/27/19.   Referred by: Amanda Lei MD  Medical Diagnosis (from order): 786.05 (ICD-9-CM) - R06.02 (ICD-10-CM) - SOB (shortness of breath)  Treatment Diagnosis: Other Diseases of Vocal Cords  Insurance: 1. [facility]  2. [facility]/BCBS [place]    Name of Insurance: [employer]  Insurance Phone #: [phone]  : TONYA     Copay: 0  500 Deductible 500 met  Coinsurance percentage: 90  2000 Out of pocket 72.73 met           Date of Onset/Injury: 2015  Precautions: Paradoxical Vocal Fold Motion  Chart reviewed: Relevant co-morbidities, allergies, tests and medications:  Endoscopy Chart reviewed.  Pt was originally seen by ENT 5/27/15. Flexible endoscope was unable to be passed through nasopharynx at the time.     Pt was seen again by ENT 6/26/15 with successful examination of nasopharynx by flexible endoscope.   Per ENT note 6/26/19: \"The nasal cavities were topically anesthetized and vasoconstricted with Lidocaine and oxymetazoline. After waiting sufficient time for the spray to take effect, pediatric flexible endoscope was passed through the nose.The scope was passed further allowing examination of the nasopharynx. The posterior choanae, eustachian tube orifices, adenoidal pad and nasopharyngeal mucosa were evaluated.Passage of the scope further allowed examination of the larynx. Tongue base, vallecula, true and false vocal cords, epiglottis, arytenoids, aryepiglottic folds, vocal cord mobility and what can be seen of the piriform sinuses are all examined.The scope was then withdrawn.  Patient was marginally cooperative for the procedure.  Findings:  Nasal cavity: Normal nasal mucosa and turbinates   Nasopharynx: Normal nasopharynx eustachian tube orifices 2-3+ adenoid  Tongue Base: No visible mass or lesions  Epiglottis, vallecula: No visible mass or lesions  Piriform sinuses: No visible mass or lesions  AE folds, arytenoids, false folds: No visible mass or lesions  True vocal folds: Mobile, visible paradoxical motion  Posterior pharyngeal wall: Moderate cobblestoning  Interarytenoid: Moderate edema\"      Pt seen by ENT 10/28/19 for c/o \"throat problem\" and previously diagnosed paradoxical vocal fold motion. Pt had been seen by ENT prior to visit 10/28/19 but had not been evaluated/treated by ST. Per ENT note 10/28/19:     \"The patient is shortness of breath with physical activity has been previously attributed to paradoxical vocal cord motion but never treated with speech therapy.  Today on exam with flexible laryngoscopy patient was extremely agitated, had to be restrained to even an attempt exam, and the evaluation of the vocal cord motion was difficult at best.  We attempted the exam any weighs at least to hopefully rule out anatomical lesion, none was seen.  Patient was screaming and crying throughout exam, it was unclear if vocal cord collapse was from laryngomalacia, floppy arytenoid tissue, or paradoxical vocal cord motion, or voluntary by the patient.  In any case there is no downside to trying speech therapy, would also recommend restarting the reflux medication which can worsen laryngomalacia.  And he certainly has significant nasal congestion that is reversible with the use of Afrin.  Would like him to start using Flonase, likely this is significantly contributing to his mouth breathing and snoring at night.\"    Current medications include Zantac, Flonase, Vyvanse  H/o otitis media 02/06/16                                       SUBJECTIVE   Father was present during evaluation this  date. Pt was reluctant to participate and required intermittent verbal encouragement from father and SLP to participate in evaluation this date. Father reported pt has inflammation in sinuses but the Flonase helps him breathe better. Pt agreed with previous statement. Pt reported difficulty running. He reported his heart rate was elevated but father denied this. Pt initially denied difficulty breathing while running but later expressed wheezing occurs with tasks of increased exertion (i.e., running the pacer during physical education). Pt is currently participating in physical education at school. When asked what pt typically does to alleviate symptoms, pt reported he puts his hands above his head and stands up straight.     Symptoms Include: shortness of breath, wheezing  Vocal Cord Irritants:  Asthma: no  Gastroesophageal reflux: possible reflux, pt was on reflux but medications were stopped per chart. pt reported occasional reflux, particularly with spicy foods. however, pt and father denied knowledge of pt's reflux.   Allergic Rhinitis: post nasal drainage, no, occasional nose bleeds  Triggers for Symptoms: exertion  Patient-Reported use of Voice:  Occupation and related voice demands: School.  Medical History Related to Voice: Reflux, Post Nasal Drainage  Voice use: Average, Excessive  Vocal misuse/abuse: soda  Stress/psychological factors: Occasional stress with homework per pt.    Pain: patient reports pain is not an issue/concern    Function:   Limitations (patient reported): breathing occasional difficulty with high exertion tasks.  Prior Level(patient reported): same as above    Prior Treatment: no therapies in the past year for current condition. Hospitalization, home health services or skilled nursing facility in the last 30 days: No, per patient.    Social Support/Home Environment: Patient lives father and mother. Parents are  and pt splits time with both..  Patient has consistent assist from  family/friends.       Safety:  Do you feel safe at home, work and/or school? yes, per patient  Fall History: (fall/near fall in past 12 months): No    Patient Goals / Concerns:  Pt reported he has \"no idea\" what his ST goals are at this time. When asked if pt would like to participate in ST to learn techniques to reduce symptoms of paradoxical vocal fold motion, pt verbalized his agreement.    OBJECTIVE   Evaluation of Voice & Resonance Analysis      [facility] Clinical Examination  Alternating /i/ sniff : no signs of difficulty breathing were noted  Rapid in and out breathing: mild wheezing was noted towards the end of task    Oral Mechanism Examination:   WNL    Breathing Patterns:    Chest    Perceptual Vocal Quality During Connected Speech:   Normal Voice.    Resonance:    WNL    Voice Onset Time:   within normal limits     Maximum Phonation Time:   21 seconds      S/Z ratio:    11/13 = .85    Prosody:    WNL    Intensity (dB):   Sustained /a/: 72.1 dB, Conversation: 73 dB    GRBAS Voice Rating Scale:  Grade: 0 = Normal    Swallow Function:  Pt denied difficulty swallowing.     ASSESSMENT   12 year old male presents to speech therapy with dx of paradoxical vocal fold motion and difficulty with respiration during tasks of high exertion. Elicited voice and respiration tasks using the [facility] Clinical Examination. No symptoms of paradoxical vocal fold motion were noted with alternating /i/ sniff task, however mild wheezing was noted with rapid in-and-out breathing near the end of task. Oral mechanism revealed WNL labial, lingual, velar, and jaw motion/coordination/functionality. Vocal quality was judged to be WNL and other areas of voice including maximum phonation time, resonance, prosody, and intensity were judged to be WNL. As pt did demonstrate one mild moment of difficulty breathing with mild exertion tasks during rapid in-and-out breathing and based on pt's chart  review/case history, ST is warranted to instruct in breathing avoidance/recovery techniques for paradoxical vocal fold motion.     Outcome:   Benefit from skilled therapy: yes to address functional limitations above.  Rehab potential is good due to positive factors age, family support and negative factors motivation level.  Plan of care to improve respiratory function to address functional limitations listed above.    Goals:    To be obtained by end of this plan of care:  1. Pt will learn and use diaphragmatic breathing techniques and methods of easy airflow release during speech on 80% of opportunities with minimal verbal/visual/tactile cues.  2. Pt will learn and use breathing avoidance/recovery exercises for paradoxical vocal fold motion events on 80% of opportunities with minimal verbal/visual/tactile cues.  3. Pt will use facilitated diaphragmatic respiration during and following exertional tasks for 10 minutes when minimal to no cueing is provided.     PLAN   Frequency/Duration: 1 times per week for 12 weeks with tapering as the patient progresses for an estimated total of 12 visits  Treatment of Speech Language ([id number])    The plan of care and goals were established with the patient and patient's guardian(s) who concurs.  Patient has been given attendance policy at time of initial evaluation.    Patient Education:  Who will be receiving education: patient and patient's guardian(s)  Are they ready to learn: yes  Preferred learning style: written, verbal, demonstration  Barriers to learning: no barriers apparent at this time   Result of initial outlined education: Needs reinforcement    Therapy Daily Billing   Primary Insurance: [email or web address] [place]  Secondary Insurance: [employer] [place]    Evaluation Procedures:  Voice & Resonance Analysis Eval    Treatment Procedures:  No treatment codes were used on this date of service    Total Treatment Time: 45 minutes    The referring provider's  electronic or written signature on the evaluation authorizes the therapy plan of care and certifies the need for these services, furnished under this plan of care while under their care.  Electronically sent for referring provider signature

## 2024-08-23 NOTE — TELEPHONE ENCOUNTER
CANCELLED for:  Colonoscopy [id number]  Provider Name:  Dr. Jackson  Date:  9/5/2024  Location:  [facility]  Sedation:  MAC  Time:  8am,        Cancelled in epic and endo

## 2024-08-30 RX ORDER — MONTELUKAST SODIUM 10 MG/1
10 TABLET ORAL NIGHTLY
Qty: 30 TABLET | Refills: 0 | Status: SHIPPED | OUTPATIENT
Start: 2024-08-30

## 2024-08-30 NOTE — TELEPHONE ENCOUNTER
Refill request sent to pharmacy on 8/30/24 for Montelukast, last OV on 8/22/23, will refill a 30 day supply but patient will need an appt for next request.

## 2024-09-04 NOTE — TELEPHONE ENCOUNTER
Scheduled for:  Colonoscopy [id number]  Provider Name:  Dr. Jackson  Date:  1/9/2025  Location:  [facility]  Sedation:  MAC  Time:  12:30pm, (pt is aware em will call with arrival time)   Prep:  Miralax  Meds/Allergies Reconciled?:  Physician reviewed      Diagnosis with codes:  Hx of colon polyps Z86.010  Was patient informed to call insurance with codes (Y/N):  Yes, I confirmed MEDICARE insurance with this patient.      Referral sent?:  Referral was sent at the time of electronic surgical scheduling.   EM or [facility] notified?:  I sent an electronic request to Endo Scheduling and received a confirmation today.      Medication Orders:  n/a  Misc Orders:  n/a     Further instructions given by staff:   I discussed the prep instructions with the patient which he verbally understood and is aware that I will mail the instructions today.

## 2024-09-30 RX ORDER — MONTELUKAST SODIUM 10 MG/1
10 TABLET ORAL NIGHTLY
Qty: 30 TABLET | Refills: 0 | OUTPATIENT
Start: 2024-09-30

## 2024-10-02 RX ORDER — MONTELUKAST SODIUM 10 MG/1
10 TABLET ORAL NIGHTLY
Qty: 30 TABLET | Refills: 0 | OUTPATIENT
Start: 2024-10-02

## 2024-10-07 RX ORDER — MONTELUKAST SODIUM 10 MG/1
10 TABLET ORAL NIGHTLY
Qty: 30 TABLET | Refills: 0 | OUTPATIENT
Start: 2024-10-07

## 2024-11-07 ENCOUNTER — TELEPHONE (OUTPATIENT)
Dept: INTERNAL MEDICINE CLINIC | Facility: CLINIC | Age: 74
End: 2024-11-07

## 2024-11-07 DIAGNOSIS — Z00.00 ANNUAL PHYSICAL EXAM: Primary | ICD-10-CM

## 2024-11-07 DIAGNOSIS — E78.00 HYPERCHOLESTEREMIA: ICD-10-CM

## 2024-11-07 DIAGNOSIS — R97.20 ELEVATED PSA: ICD-10-CM

## 2024-11-07 DIAGNOSIS — I10 BENIGN HYPERTENSION: ICD-10-CM

## 2024-11-09 ENCOUNTER — LAB ENCOUNTER (OUTPATIENT)
Dept: LAB | Age: 74
End: 2024-11-09
Attending: INTERNAL MEDICINE
Payer: MEDICARE

## 2024-11-09 DIAGNOSIS — I10 BENIGN HYPERTENSION: ICD-10-CM

## 2024-11-09 DIAGNOSIS — R97.20 ELEVATED PSA: ICD-10-CM

## 2024-11-09 DIAGNOSIS — E78.00 HYPERCHOLESTEREMIA: ICD-10-CM

## 2024-11-09 LAB
ALBUMIN SERPL-MCNC: 4.8 G/DL (ref 3.2–4.8)
ALBUMIN/GLOB SERPL: 1.6 {RATIO} (ref 1–2)
ALP LIVER SERPL-CCNC: 71 U/L
ALT SERPL-CCNC: 21 U/L
ANION GAP SERPL CALC-SCNC: 9 MMOL/L (ref 0–18)
AST SERPL-CCNC: 21 U/L (ref ?–34)
BASOPHILS # BLD AUTO: 0.12 X10(3) UL (ref 0–0.2)
BASOPHILS NFR BLD AUTO: 1.3 %
BILIRUB SERPL-MCNC: 0.7 MG/DL (ref 0.2–1.1)
BUN BLD-MCNC: 27 MG/DL (ref 9–23)
BUN/CREAT SERPL: 26 (ref 10–20)
CALCIUM BLD-MCNC: 9.9 MG/DL (ref 8.7–10.4)
CHLORIDE SERPL-SCNC: 107 MMOL/L (ref 98–112)
CHOLEST SERPL-MCNC: 139 MG/DL (ref ?–200)
CO2 SERPL-SCNC: 26 MMOL/L (ref 21–32)
CREAT BLD-MCNC: 1.04 MG/DL
DEPRECATED RDW RBC AUTO: 42.6 FL (ref 35.1–46.3)
EGFRCR SERPLBLD CKD-EPI 2021: 76 ML/MIN/1.73M2 (ref 60–?)
EOSINOPHIL # BLD AUTO: 0.89 X10(3) UL (ref 0–0.7)
EOSINOPHIL NFR BLD AUTO: 9.6 %
ERYTHROCYTE [DISTWIDTH] IN BLOOD BY AUTOMATED COUNT: 13.6 % (ref 11–15)
FASTING PATIENT LIPID ANSWER: YES
FASTING STATUS PATIENT QL REPORTED: YES
GLOBULIN PLAS-MCNC: 3 G/DL (ref 2–3.5)
GLUCOSE BLD-MCNC: 110 MG/DL (ref 70–99)
HCT VFR BLD AUTO: 46.5 %
HDLC SERPL-MCNC: 36 MG/DL (ref 40–59)
HGB BLD-MCNC: 15.2 G/DL
IMM GRANULOCYTES # BLD AUTO: 0.03 X10(3) UL (ref 0–1)
IMM GRANULOCYTES NFR BLD: 0.3 %
LDLC SERPL CALC-MCNC: 82 MG/DL (ref ?–100)
LYMPHOCYTES # BLD AUTO: 2.42 X10(3) UL (ref 1–4)
LYMPHOCYTES NFR BLD AUTO: 26.2 %
MCH RBC QN AUTO: 28.1 PG (ref 26–34)
MCHC RBC AUTO-ENTMCNC: 32.7 G/DL (ref 31–37)
MCV RBC AUTO: 86.1 FL
MONOCYTES # BLD AUTO: 0.74 X10(3) UL (ref 0.1–1)
MONOCYTES NFR BLD AUTO: 8 %
NEUTROPHILS # BLD AUTO: 5.04 X10 (3) UL (ref 1.5–7.7)
NEUTROPHILS # BLD AUTO: 5.04 X10(3) UL (ref 1.5–7.7)
NEUTROPHILS NFR BLD AUTO: 54.6 %
NONHDLC SERPL-MCNC: 103 MG/DL (ref ?–130)
OSMOLALITY SERPL CALC.SUM OF ELEC: 300 MOSM/KG (ref 275–295)
PLATELET # BLD AUTO: 248 10(3)UL (ref 150–450)
POTASSIUM SERPL-SCNC: 4 MMOL/L (ref 3.5–5.1)
PROT SERPL-MCNC: 7.8 G/DL (ref 5.7–8.2)
PSA SERPL-MCNC: 4.95 NG/ML (ref ?–4)
RBC # BLD AUTO: 5.4 X10(6)UL
SODIUM SERPL-SCNC: 142 MMOL/L (ref 136–145)
TRIGL SERPL-MCNC: 112 MG/DL (ref 30–149)
TSI SER-ACNC: 1.93 UIU/ML (ref 0.55–4.78)
VLDLC SERPL CALC-MCNC: 18 MG/DL (ref 0–30)
WBC # BLD AUTO: 9.2 X10(3) UL (ref 4–11)

## 2024-11-09 PROCEDURE — 80061 LIPID PANEL: CPT

## 2024-11-09 PROCEDURE — 84153 ASSAY OF PSA TOTAL: CPT

## 2024-11-09 PROCEDURE — 84443 ASSAY THYROID STIM HORMONE: CPT

## 2024-11-09 PROCEDURE — 85025 COMPLETE CBC W/AUTO DIFF WBC: CPT

## 2024-11-09 PROCEDURE — 80053 COMPREHEN METABOLIC PANEL: CPT

## 2024-11-09 PROCEDURE — 36415 COLL VENOUS BLD VENIPUNCTURE: CPT

## 2024-11-13 ENCOUNTER — OFFICE VISIT (OUTPATIENT)
Dept: INTERNAL MEDICINE CLINIC | Facility: CLINIC | Age: 74
End: 2024-11-13

## 2024-11-13 VITALS
BODY MASS INDEX: 30.09 KG/M2 | DIASTOLIC BLOOD PRESSURE: 69 MMHG | WEIGHT: 227 LBS | HEIGHT: 73 IN | HEART RATE: 74 BPM | SYSTOLIC BLOOD PRESSURE: 104 MMHG | OXYGEN SATURATION: 98 %

## 2024-11-13 DIAGNOSIS — Z86.69 HISTORY OF CONJUNCTIVITIS: ICD-10-CM

## 2024-11-13 DIAGNOSIS — D12.6 ADENOMATOUS POLYP OF COLON, UNSPECIFIED PART OF COLON: ICD-10-CM

## 2024-11-13 DIAGNOSIS — Z87.09 HISTORY OF SINUSITIS: ICD-10-CM

## 2024-11-13 DIAGNOSIS — H93.12 TINNITUS, LEFT EAR: ICD-10-CM

## 2024-11-13 DIAGNOSIS — J33.9 NASAL POLYPOSIS: ICD-10-CM

## 2024-11-13 DIAGNOSIS — E78.1 HYPERTRIGLYCERIDEMIA: ICD-10-CM

## 2024-11-13 DIAGNOSIS — L23.9 ALLERGIC CONTACT DERMATITIS, UNSPECIFIED TRIGGER: ICD-10-CM

## 2024-11-13 DIAGNOSIS — B35.3 TINEA PEDIS, UNSPECIFIED LATERALITY: ICD-10-CM

## 2024-11-13 DIAGNOSIS — R97.20 ELEVATED PSA: ICD-10-CM

## 2024-11-13 DIAGNOSIS — K59.00 CONSTIPATION, UNSPECIFIED CONSTIPATION TYPE: ICD-10-CM

## 2024-11-13 DIAGNOSIS — Z00.00 ANNUAL PHYSICAL EXAM: Primary | ICD-10-CM

## 2024-11-13 DIAGNOSIS — I83.93 VARICOSE VEINS OF BOTH LOWER EXTREMITIES, UNSPECIFIED WHETHER COMPLICATED: ICD-10-CM

## 2024-11-13 DIAGNOSIS — I87.2 CHRONIC VENOUS INSUFFICIENCY: ICD-10-CM

## 2024-11-13 DIAGNOSIS — E78.00 HYPERCHOLESTEREMIA: ICD-10-CM

## 2024-11-13 DIAGNOSIS — M20.60 DEFORMITY OF TOE, UNSPECIFIED LATERALITY: ICD-10-CM

## 2024-11-13 DIAGNOSIS — I10 BENIGN HYPERTENSION: ICD-10-CM

## 2024-11-13 PROCEDURE — 99214 OFFICE O/P EST MOD 30 MIN: CPT | Performed by: INTERNAL MEDICINE

## 2024-11-13 PROCEDURE — G0008 ADMIN INFLUENZA VIRUS VAC: HCPCS | Performed by: INTERNAL MEDICINE

## 2024-11-13 PROCEDURE — G0439 PPPS, SUBSEQ VISIT: HCPCS | Performed by: INTERNAL MEDICINE

## 2024-11-13 PROCEDURE — 90662 IIV NO PRSV INCREASED AG IM: CPT | Performed by: INTERNAL MEDICINE

## 2024-11-13 RX ORDER — AMLODIPINE BESYLATE 10 MG/1
10 TABLET ORAL DAILY
Qty: 90 TABLET | Refills: 3 | Status: SHIPPED | OUTPATIENT
Start: 2025-01-01

## 2024-11-13 RX ORDER — ATORVASTATIN CALCIUM 40 MG/1
40 TABLET, FILM COATED ORAL DAILY
Qty: 90 TABLET | Refills: 3 | Status: SHIPPED | OUTPATIENT
Start: 2025-01-01

## 2024-11-13 NOTE — PROGRESS NOTES
Benjamin Rachel Jr. is a 74 year old male.    HPI:     Chief Complaint   Patient presents with    Physical     Here today for Medicare Annual Physical      chief complaint: presents for both AWV and follow up for chronic conditions and/or medication refills,      73 y/o M here for subsequent Medicare annual wellness exam; pt walks flat-footed; had recent ultrasound showed \"good blood flow\" to feet; pt plans to obtain new shoes to attempt more walking exercises; LDL 82 in Nov 2024 on atorvastatin 40 mg po at bedtime;   SBP 100s on amlodipine 10 mg po qD;  no CP; no SOB; no headaches; no palpitation          HISTORY:  Past Medical History:    BPH (benign prostatic hyperplasia)    Chronic venous insufficiency of lower extremity    Contact dermatitis    to legs; since 1988    High blood pressure    History of right inguinal hernia repair    age 7     Nasal polyps    ENT Dr Quintana    S/P LASIK surgery of both eyes    Toe deformity    since 1988      Past Surgical History:   Procedure Laterality Date    Colonoscopy N/A 8/10/2021    Procedure: COLONOSCOPY;  Surgeon: Fareed Jackson MD;  Location: [facility] ENDOSCOPY    Excision turbinate,submucous  12/27/2019    Eye surgery      Hernia surgery      Nasal scope,bx/rmv polyp/debrid  12/27/2019    Nasal scopy,explor frontal sinus  12/27/2019    Nasal scopy,remv part ethmoid  12/27/2019    Nasal scopy,remv tiss sphenoid  12/27/2019    Nasal scopy,rmv tiss maxill sinus  12/27/2019      Family History   Problem Relation Age of Onset    No Known Problems Father     No Known Problems Mother     Diabetes Sister       Social History:   Social History     Socioeconomic History    Marital status: Single   Tobacco Use    Smoking status: Never    Smokeless tobacco: Never   Vaping Use    Vaping status: Never Used   Substance and Sexual Activity    Alcohol use: No     Comment: Over 40 yrs    Drug use: Never        Medications (Active prior to today's visit):  Current Outpatient Medications    Medication Sig Dispense Refill    [START ON 2025] atorvastatin 40 MG Oral Tab Take 1 tablet (40 mg total) by mouth daily. 90 tablet 3    [START ON 2025] amLODIPine 10 MG Oral Tab Take 1 tablet (10 mg total) by mouth daily. 90 tablet 3    MONTELUKAST 10 MG Oral Tab Take 1 tablet (10 mg total) by mouth nightly 30 tablet 0    Diaper Rash Products (CERAVE BABY HEALING OINTMENT EX) Apply topically.      ASPIRIN LOW DOSE 81 MG Oral Chew Tab Chew 1 tablet (81 mg total) by mouth daily.         Allergies:  Allergies[1]        General Health     In the past six months, have you lost more than 10 pounds without trying?: 2 - No    Has your appetite been poor?: No    Type of Diet: Balanced    How does the patient maintain a good energy level?: Daily Walks    How would you describe your daily physical activity?: Light    How would you describe your current health state?: Good    How do you maintain positive mental well-being?: Social Interaction;Puzzles;Games;Visiting Friends;Visiting Family         Have you had any immunizations at another office such as Influenza, Hepatitis B, Tetanus, or Pneumococcal?: No     Functional Ability     Bathing or Showering: Able without help    Toileting: Able without help    Dressing: Able without help    Eating: Able without help    Driving: Able without help    Preparing your meals: Able without help    Managing money/bills: Able without help    Taking medications as prescribed: Able without help    Are you able to afford your medications?: Yes    Hearing Problems?: No     Functional Status     Hearing Problems?: No    Vision Problems? : No    Difficulty walking?: No    Difficulty dressing or bathing?: No    Problems with daily activities? : No    Memory Problems?: No      Fall/Risk Assessment          Have you fallen in the last 12 months?: 0-No                              Fall/Risk Scorin         Depression Screening (PHQ-2/PHQ-9): Over the LAST 2 WEEKS   Little interest or  pleasure in doing things (over the last two weeks)?: Not at all     Feeling down, depressed, or hopeless (over the last two weeks)?: Not at all     PHQ-2 SCORE: 0         Advance Directives     Do you have a healthcare power of ?: Yes    Do you have a living will?: Yes     Hearing Assessment (Required for AWV/SWV)      Hearing Screening    Screening Method: Whisper Test  Whisper Test Result: Pass         Visual Acuity                   Cognitive Assessment     What day of the week is this?: Correct    What month is it?: Correct    What year is it?: Correct    Recall \"Ball\": Correct    Recall \"Flag\": Correct    Recall \"Tree\": Correct        Benjamin Rachel Jr.'s SCREENING SCHEDULE   Tests on this list are recommended by your physician but may not be covered, or covered at this frequency, by your insurer. Please check with your insurance carrier before scheduling to verify coverage.    PREVENTATIVE SERVICES  INDICATIONS AND SCHEDULE Internal Lab or Procedure External Lab or Procedure   Diabetes Screening      HbgA1C   Annually No results found for: \"A1C\"      No data to display                Fasting Blood Sugar (FSB) Annually Glucose (mg/dL)   Date Value   11/09/2024 110 (H)       Cardiovascular Disease Screening     LDL Annually LDL Cholesterol (mg/dL)   Date Value   11/09/2024 82        EKG One Time     Colorectal Cancer Screening      Colonoscopy Screen every 10 years Health Maintenance   Topic Date Due    Colorectal Cancer Screening  08/10/2024    Update [facility] if applicable    Flex Sigmoidoscopy Screen every 5 years No results found for this or any previous visit.      No data to display                 Fecal Occult Blood Annually No results found for: \"FOB\", \"OCCULTSTOOL\"      No data to display                Glaucoma Screening      Ophthalmology Visit Annually     Prostate Cancer Screening      PSA  Annually Health Maintenance   Topic Date Due    PSA  11/09/2026     Update Health  Maintenance if applicable   Immunizations      Influenza No orders found for this or any previous visit. Update Immunization Activity if applicable    Pneumococcal Orders placed or performed in visit on 06/10/20    PNEUMOCOCCAL IMM, 23    Update Immunization Activity if applicable    Hepatitis B No orders found for this or any previous visit. Update Immunization Activity if applicable    Tetanus No orders found for this or any previous visit. Update Immunization Activity if applicable    Zoster (Not covered by Medicare Part B) No orders found for this or any previous visit. Update Immunization Activity if applicable     SPECIFIC DISEASE MONITORING Internal Lab or Procedure External Lab or Procedure   Annual Monitoring of Persistent     Medications (ACE/ARB, digoxin, diuretics)    Potassium  Annually Potassium (mmol/L)   Date Value   11/09/2024 4.0         No data to display                Creatinine  Annually Creatinine (mg/dL)   Date Value   11/09/2024 1.04         No data to display                Digoxin Serum Conc  Annually No results found for: \"DIGOXIN\"      No data to display                Diabetes      HgbA1C  Annually No results found for: \"A1C\"      No data to display                Creat/alb ratio  Annually      LDL  Annually LDL Cholesterol (mg/dL)   Date Value   11/09/2024 82         No data to display                 Dilated Eye exam  Annually      No data to display                   No data to display                COPD      Spirometry Testing Annually No results found for this or any previous visit.      No data to display                        ROS:   Constitutional: no weight loss; no fatigue  ENMT:  Negative for ear drainage, hearing loss and nasal drainage  Eyes:  Negative for eye discharge and vision loss  Cardiovascular:  Negative for chest pain; negative palpitations  Respiratory:  Negative for cough, dyspnea and wheezing  Endocrine:  Negative for abnormal sleep patterns, increased  activity, polydipsia and polyphagia  Gastrointestinal:  Negative for abdominal pain, constipation, decreased appetite, diarrhea and vomiting; no melena or hematochezia  Musculoskeletal:  Negative for arthralgias or myalgias  Genitourinary:  Negative for dysuria or polyuria  Hema/Lymph:  Negative for easy bleeding and easy bruising  Integumentary:  Negative for pruritus and rash  Neurological:  Negative for gait disturbance; negative for paresthesias   All other review of systems are negative.        PHYSICAL EXAM:   Blood pressure 104/69, pulse 74, height 6' 1\" (1.854 m), weight 227 lb (103 kg), SpO2 98%.  Constitutional: alert and oriented x3 in no acute distress  HEENT- EOMI, PERRL  Nose/Mouth/Throat: pharynx without erythema; no oral lesions  Neck/Thyroid: neck supple; no thyromegaly  Lymphatics: no lymphadenopathy of neck or groin  Cardiovascular: RRR, S1, S2, no S3 or murmur  Respiratory: lungs without crackles or wheezes  Abdomen: normoactive bowel sounds, soft, non-tender and non-distended  Extremities: no clubbing, cyanosis or edema  Vascular: no carotid bruits; DP/PT 2/2  Musculoskeletal: Motor 5/5 upper and lower extremities  Neurological: cranial nerves II-XII intact; light touch and proprioception intact  Skin: no rash or ulcerations         ASSESSMENT/PLAN:   Physical exam  Pneumovax given in June 2020; flu vax in Oct 2021 elsewhere; s/p COVID Pfizer vax x 2     Constipation  -tried Miralax 17 gm po daily, though only had one BM;   -stopped Miralax  -on lactulose 30 mL po BID prn; has had BM; does not use regularly  -if no benefit with lactulose, would consider Linzess, or change amlodipine to alternate anti-hypertensive     History of Acute sinusitis  -s/p cefdinir 300 mg po BID #20     History of Chemical conjunctivitis  On 9/1/22; Left eye irrigated with sterile water  -s/p Tobradex one drop q 4 hours OS     Chronic venous insufficiency  No Rx for now; was seen by PV surgery Dr Allison at [facility]      hypercholesterolemia  LDL 82 in Nov 2024 on atorvastatin 40 mg po at bedtime as ordered by PV surgery Dr Allison at [facility]     severe Tinea pedis  with hyperkeratosis to right heel; two large 1.5 cm skin cracks noted--> sealed with Dermabond in Nov 2021;     Colon polyps  screening colonoscopy 8/10/21 by GI Dr Jackson for colon cancer screening; next colonoscopy in 3 years, or Aug 2024; scheduled Jan 2025     Tinnitus, left ear  Began after COVID vaccine 3/15/21; had hearing tested by ENT Dr Quintana and 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; tinnitus has been lessening, though not entirely reoslved     Contact dermatitis with +xerosis  Pt avoiding contact with Tide detergent; no pruritus, so would avoid steroids initially  -improved with Eucerin cream ATAA BID, and clotrimazole 1% cream ATAA BID  -has irritation to urethral meatus intermittently since June 2019; saw MARTI ALEXANDER at [place]; improved with avoidance of Dial soap  -pt prefers Cerave cream over Eucerin     HTN  SBP 100s on amlodipine 10 mg po qD; CPM; last EKG in Feb 2019 showed NSR without ischemic abnormality;     Nasal polyposis  taking Flonase NS 1 sp EN BID  S/p Medtr[employer] assisted navigational sinus surgery, Bilateral transnasal endoscopic: total ethmoidectomies, sphenoidotomies, frontal sinusotomies, maxillary antrostomies with tissue removal, nasal polypectomies  and bilateral inferior turbinate outfracture with submucosal resection, placement of intranasal propel implants on 12/27/19 per ENT Dr Quintana  -now sees ENT Dr Lind; s/p Bilateral endoscopic maxillary antrostomy, endoscopic total ethmoidectomy, endoscopic nasal polypectomy on 11/21/23  -on montelukast 10 mg po at bedtime; wheezing has lessened     Toe deformity  F/U podiatrist Dr Velazquez at [place]     Varicose veins  Noted to BLE; no ulcerations; had venous doppler study for venous insufficiency on 1/24/19 at [place]; saw PV surgery 11/26/19 and saw APN at [facility]; was advised for LE  compression stockings; saw PV surgery Dr Abby Spears at [place] in Feb 2020; was advised to wear [employer] LE stockings; has F/U appt later this year     Elevated PSA   PSA 4.95 in Nov 2024; PSA 5.15 in Oct 2021; PSA 3.38 in Sept 2020;  MRI prostate in Feb 2022 shows PI-RADS 4; had prostate biopsy showed PIN-high grade with adjacent area suspicious for carcinoma; PSA 5.26 in Oct 2022; PSA 6.02 in Nov 2023; has been seeing  Dr Morgan Lundy at  [facility] ; plans to see Dr Lundy for repeat MRI and prostate biopsy     Hypertriglyceridemia   in Nov 2024; advise low fat diet        MWE 11/6/23        Spent 30 minutes obtaining history, evaluating patient, discussing treatment options, diet, exercise, review of available labs and radiology reports, and completing documentation.                Orders This Visit:  Orders Placed This Encounter   Procedures    High Dose Fluzone trivalent influenza, 65yrs+ PFS ([id number])       Meds This Visit:  Requested Prescriptions     Signed Prescriptions Disp Refills    atorvastatin 40 MG Oral Tab 90 tablet 3     Sig: Take 1 tablet (40 mg total) by mouth daily.    amLODIPine 10 MG Oral Tab 90 tablet 3     Sig: Take 1 tablet (10 mg total) by mouth daily.       Imaging & Referrals:  INFLUENZA VAC HIGH DOSE PRSV FREE     11/13/2024  Cole Yousif MD               [1]   Allergies  Allergen Reactions    Soap RASH and OTHER (SEE COMMENTS)     Tide no dye     Skin reaction, also itching

## 2025-01-09 ENCOUNTER — HOSPITAL ENCOUNTER (OUTPATIENT)
Facility: HOSPITAL | Age: 75
Setting detail: HOSPITAL OUTPATIENT SURGERY
Discharge: HOME | End: 2025-01-09
Attending: INTERNAL MEDICINE | Admitting: INTERNAL MEDICINE
Payer: MEDICARE

## 2025-01-09 ENCOUNTER — ANESTHESIA (OUTPATIENT)
Dept: ENDOSCOPY | Facility: HOSPITAL | Age: 75
End: 2025-01-09
Payer: MEDICARE

## 2025-01-09 ENCOUNTER — ANESTHESIA EVENT (OUTPATIENT)
Dept: ENDOSCOPY | Facility: HOSPITAL | Age: 75
End: 2025-01-09
Payer: MEDICARE

## 2025-01-09 VITALS
WEIGHT: 225 LBS | SYSTOLIC BLOOD PRESSURE: 130 MMHG | OXYGEN SATURATION: 99 % | RESPIRATION RATE: 16 BRPM | HEIGHT: 73 IN | DIASTOLIC BLOOD PRESSURE: 59 MMHG | HEART RATE: 63 BPM | BODY MASS INDEX: 29.82 KG/M2

## 2025-01-09 DIAGNOSIS — Z86.0100 PERSONAL HISTORY OF COLONIC POLYPS: ICD-10-CM

## 2025-01-09 DIAGNOSIS — Z86.0100 HISTORY OF COLONIC POLYPS: ICD-10-CM

## 2025-01-09 PROCEDURE — 0DJD8ZZ INSPECTION OF LOWER INTESTINAL TRACT, VIA NATURAL OR ARTIFICIAL OPENING ENDOSCOPIC: ICD-10-PCS | Performed by: INTERNAL MEDICINE

## 2025-01-09 PROCEDURE — G0105 COLORECTAL SCRN; HI RISK IND: HCPCS | Performed by: INTERNAL MEDICINE

## 2025-01-09 RX ORDER — LIDOCAINE HYDROCHLORIDE 10 MG/ML
INJECTION, SOLUTION EPIDURAL; INFILTRATION; INTRACAUDAL; PERINEURAL AS NEEDED
Status: DISCONTINUED | OUTPATIENT
Start: 2025-01-09 | End: 2025-01-09 | Stop reason: SURG

## 2025-01-09 RX ORDER — SODIUM CHLORIDE, SODIUM LACTATE, POTASSIUM CHLORIDE, CALCIUM CHLORIDE 600; 310; 30; 20 MG/100ML; MG/100ML; MG/100ML; MG/100ML
INJECTION, SOLUTION INTRAVENOUS CONTINUOUS
Status: DISCONTINUED | OUTPATIENT
Start: 2025-01-09 | End: 2025-01-09

## 2025-01-09 RX ADMIN — LIDOCAINE HYDROCHLORIDE 50 MG: 10 INJECTION, SOLUTION EPIDURAL; INFILTRATION; INTRACAUDAL; PERINEURAL at 12:58:00

## 2025-01-09 NOTE — OPERATIVE REPORT
[facility] Endoscopy Report      Date of Procedure:  01/09/25      Preoperative Diagnosis:  Personal history of adenomatous colon polyps      Postoperative Diagnosis:  Sigmoid colon diverticulosis      Procedure:    Colonoscopy       Surgeon:  Fareed Jackson M.D.      Anesthesia:  Monitored anesthesia care  Cecal withdrawal time: 19 minutes  EBL: None      Brief History:  This is a 74 year old male who presents for surveillance colonoscopy in the setting of a history of #4 subcentimeter adenomatous polyps (SSAs and TAs) removed endoscopically a little over 3 years prior.  The patient has been asymptomatic from a lower gastrointestinal tract standpoint.      Technique:  After informed consent, the patient was placed in the left lateral recumbent position.  Digital rectal examination revealed no palpable intraluminal abnormalities.  An Olympus variable stiffness 190 series HD colonoscope was inserted into the rectum and advanced under direct vision by following the lumen to the terminal ileum.  The colon was examined upon withdrawal in the left lateral recumbent position.      Findings:  The preparation of the colon was reasonably good with irrigation.  The terminal ileum was examined for 5 cm and visually normal.  The ileocecal valve was well preserved. The visualized colonic mucosa from the cecum to the anal verge was normal with an intact vascular pattern.  There were several diverticula seen in the sigmoid colon without signs of complication.  There were no colonic polyps, mass lesions, vascular anomalies or signs of inflammation seen.  Retroflexion in the rectum revealed incidental internal hemorrhoids.  The procedure was well tolerated without immediate complication.      Impression:  1.  Uncomplicated sigmoid colon diverticulosis  2.  Otherwise normal colonoscopy to the terminal ileum    Recommendations:  1.  High-fiber diet.  2.  Observation versus surveillance colonoscopy in 5 years  depending on patient preference, functional status and comorbidities.        Fareed Jackson MD  1/9/2025  1:29 PM

## 2025-01-09 NOTE — DISCHARGE INSTRUCTIONS
Home Care Instructions for Colonoscopy with Sedation    Diet:  - Resume your regular diet as tolerated unless otherwise instructed.  - Start with light meals to minimize bloating.  - Do not drink alcohol today.    Medication:  - If you have questions about resuming your normal medications, please contact your Primary Care Physician.    Activities:  - Take it easy today. Do not return to work today.  - Do not drive today.  - Do not operate any machinery today (including kitchen equipment).    Colonoscopy:  - You may notice some rectal \"spotting\" (a little blood on the toilet tissue) for a day or two after the exam. This is normal.  - If you experience any rectal bleeding (not spotting), persistent tenderness or sharp severe abdominal pains, oral temperature over 100 degrees Fahrenheit, light-headedness or dizziness, or any other problems, contact your doctor.      **If unable to reach your doctor, please go to the [facility] Emergency Room**    - Your referring physician will receive a full report of your examination.  - If you do not hear from your doctor's office within two weeks of your biopsy, please call them for your results.    You may be able to see your laboratory results in [employer] between 4 and 7 business days.  In some cases, your physician may not have viewed the results before they are released to [employer].  If you have questions regarding your results contact the physician who ordered the test/exam by phone or via [employer] by choosing \"Ask a Medical Question.\"

## 2025-01-09 NOTE — ANESTHESIA POSTPROCEDURE EVALUATION
Patient: Benjamin Rachel Jr.    Procedure Summary       Date: 01/09/25 Room / Location: [facility] ENDOSCOPY 03 / [facility] ENDOSCOPY    Anesthesia Start: 1255 Anesthesia Stop: 1338    Procedure: COLONOSCOPY Diagnosis:       Personal history of colonic polyps      (Diverticulosis)    Surgeons: Fareed Jackson MD Anesthesiologist: Salina Avina CRNA    Anesthesia Type: MAC ASA Status: 3            Anesthesia Type: MAC    Vitals Value Taken Time   /64 01/09/25 1335   Temp 97 01/09/25 1338   Pulse 58 01/09/25 1338   Resp 19 01/09/25 1338   SpO2 98 % 01/09/25 1338   Vitals shown include unfiled device data.    [facility] AN Post Evaluation:   Patient Evaluated in PACU  Patient Participation: complete - patient participated  Level of Consciousness: awake and alert  Pain Score: 0  Pain Management: adequate  Airway Patency:patent  Yes    Nausea/Vomiting: none  Cardiovascular Status: acceptable  Respiratory Status: acceptable  Postoperative Hydration acceptable      Salina Avina CRNA  1/9/2025 1:38 PM

## 2025-01-09 NOTE — H&P
History & Physical Examination    Patient Name: Benjamin Rachel Jr.  MRN: [id number]  CSN: [id number]  YOB: 1950    Diagnosis: Personal history of adenomatous colon polyps      Prescriptions Prior to Admission[1]  Current Facility-Administered Medications   Medication Dose Route Frequency    lactated ringers infusion   Intravenous Continuous       Allergies: Allergies[2]    Past Medical History:    BPH (benign prostatic hyperplasia)    Chronic venous insufficiency of lower extremity    Contact dermatitis    to legs; since 1988    High blood pressure    High cholesterol    History of right inguinal hernia repair    age 7     Nasal polyps    ENT Dr Quintana    S/P LASIK surgery of both eyes    Toe deformity    since 1988     Past Surgical History:   Procedure Laterality Date    Colonoscopy N/A 8/10/2021    Procedure: COLONOSCOPY;  Surgeon: Fareed Jackson MD;  Location: [facility] ENDOSCOPY    Excision turbinate,submucous  12/27/2019    Eye surgery      Hernia surgery      Nasal scope,bx/rmv polyp/debrid  12/27/2019    Nasal scopy,explor frontal sinus  12/27/2019    Nasal scopy,remv part ethmoid  12/27/2019    Nasal scopy,remv tiss sphenoid  12/27/2019    Nasal scopy,rmv tiss maxill sinus  12/27/2019     Family History   Problem Relation Age of Onset    No Known Problems Father     No Known Problems Mother     Diabetes Sister      Social History     Tobacco Use    Smoking status: Never    Smokeless tobacco: Never   Substance Use Topics    Alcohol use: No     Comment: Over 40 yrs       SYSTEM Check if Review is Normal Check if Physical Exam is Normal If not normal, please explain:   HEENT [X ] [ X]    NECK  [X ] [ X]    HEART [X ] [ X]    LUNGS [X ] [ X]    ABDOMEN [X ] [ X]    EXTREMITIES [X ] [ X]    OTHER        [ x ] I have discussed the risks and benefits and alternatives with the patient/family.  They understand and agree to proceed with plan of care.  [ x ] I have reviewed the History and Physical done  within the last 30 days.  Any changes noted above.    Fareed Jackson MD  1/9/2025  12:51 PM             [1]   Medications Prior to Admission   Medication Sig Dispense Refill Last Dose/Taking    atorvastatin 40 MG Oral Tab Take 1 tablet (40 mg total) by mouth daily. 90 tablet 3 1/9/2025    amLODIPine 10 MG Oral Tab Take 1 tablet (10 mg total) by mouth daily. 90 tablet 3 1/9/2025    ASPIRIN LOW DOSE 81 MG Oral Chew Tab Chew 1 tablet (81 mg total) by mouth daily.   1/6/2025    Diaper Rash Products (CERAVE BABY HEALING OINTMENT EX) Apply topically.      [2]   Allergies  Allergen Reactions    Soap RASH and OTHER (SEE COMMENTS)     Tide no dye     Skin reaction, also itching

## 2025-01-09 NOTE — ANESTHESIA PREPROCEDURE EVALUATION
Anesthesia PreOp Note    HPI:     Benjamin Rachel Jr. is a 74 year old male who presents for preoperative consultation requested by: Fareed Jackson MD    Date of Surgery: 1/9/2025    Procedure(s):  COLONOSCOPY  Indication: Personal history of colonic polyps    Relevant Problems   No relevant active problems       NPO:  Last Liquid Consumption Date: 01/09/25  Last Liquid Consumption Time: 0630  Last Solid Consumption Date: 01/08/25  Last Solid Consumption Time: 0800  Last Liquid Consumption Date: 01/09/25          History Review:  Patient Active Problem List    Diagnosis Date Noted    Pulsatile tinnitus of left ear 03/19/2021       Past Medical History:    BPH (benign prostatic hyperplasia)    Chronic venous insufficiency of lower extremity    Contact dermatitis    to legs; since 1988    High blood pressure    High cholesterol    History of right inguinal hernia repair    age 7     Nasal polyps    ENT Dr Qunitana    S/P LASIK surgery of both eyes    Toe deformity    since 1988       Past Surgical History:   Procedure Laterality Date    Colonoscopy N/A 8/10/2021    Procedure: COLONOSCOPY;  Surgeon: Fareed Jackson MD;  Location: [facility] ENDOSCOPY    Excision turbinate,submucous  12/27/2019    Eye surgery      Hernia surgery      Nasal scope,bx/rmv polyp/debrid  12/27/2019    Nasal scopy,explor frontal sinus  12/27/2019    Nasal scopy,remv part ethmoid  12/27/2019    Nasal scopy,remv tiss sphenoid  12/27/2019    Nasal scopy,rmv tiss maxill sinus  12/27/2019       Prescriptions Prior to Admission[1]  Current Medications and Prescriptions Ordered in Epic[2]    Allergies[3]    Family History   Problem Relation Age of Onset    No Known Problems Father     No Known Problems Mother     Diabetes Sister      Social History     Socioeconomic History    Marital status: Single   Tobacco Use    Smoking status: Never    Smokeless tobacco: Never   Vaping Use    Vaping status: Never Used   Substance and Sexual Activity     Alcohol use: No     Comment: Over 40 yrs    Drug use: Never       Available pre-op labs reviewed.  Lab Results   Component Value Date    WBC 9.2 11/09/2024    RBC 5.40 11/09/2024    HGB 15.2 11/09/2024    HCT 46.5 11/09/2024    MCV 86.1 11/09/2024    MCH 28.1 11/09/2024    MCHC 32.7 11/09/2024    RDW 13.6 11/09/2024    .0 11/09/2024     Lab Results   Component Value Date     11/09/2024    K 4.0 11/09/2024     11/09/2024    CO2 26.0 11/09/2024    BUN 27 (H) 11/09/2024    CREATSERUM 1.04 11/09/2024     (H) 11/09/2024    CA 9.9 11/09/2024          Vital Signs:  Body mass index is 29.69 kg/m².   height is 1.854 m (6' 1\") and weight is 102.1 kg (225 lb). His blood pressure is 134/74 and his pulse is 74. His respiration is 17 and oxygen saturation is 98%.   Vitals:    01/03/25 1451 01/09/25 1226   BP:  134/74   Pulse:  74   Resp:  17   SpO2:  98%   Weight: 102.1 kg (225 lb)    Height: 1.854 m (6' 1\")         Anesthesia Evaluation     Patient summary reviewed and Nursing notes reviewed    No history of anesthetic complications   Airway   Mallampati: II  TM distance: >3 FB  Neck ROM: full  Dental - Dentition appears grossly intact     Pulmonary - negative ROS and normal exam   Cardiovascular - normal exam  Exercise tolerance: poor  (+) hypertension well controlled    Rhythm: regular  Rate: normal    Neuro/Psych - negative ROS     GI/Hepatic/Renal - negative ROS     Endo/Other - negative ROS   Abdominal  - normal exam                 Anesthesia Plan:   ASA:  3  Plan:   MAC  Post-op Pain Management: IV analgesics  Informed Consent Plan and Risks Discussed With:  Patient  Use of Blood Products Discussed With:  Patient      I have informed Benjamin Rachel Jr. and/or legal guardian or family member of the nature of the anesthetic plan, benefits, risks including possible dental damage if relevant, major complications, and any alternative forms of anesthetic management.   All of the patient's questions  were answered to the best of my ability. The patient desires the anesthetic management as planned.  Salina AvinaEMILEE  1/9/2025 12:51 PM  Present on Admission:  **None**           [1]   Medications Prior to Admission   Medication Sig Dispense Refill Last Dose/Taking    atorvastatin 40 MG Oral Tab Take 1 tablet (40 mg total) by mouth daily. 90 tablet 3 1/9/2025    amLODIPine 10 MG Oral Tab Take 1 tablet (10 mg total) by mouth daily. 90 tablet 3 1/9/2025    ASPIRIN LOW DOSE 81 MG Oral Chew Tab Chew 1 tablet (81 mg total) by mouth daily.   1/6/2025    Diaper Rash Products (CERAVE BABY HEALING OINTMENT EX) Apply topically.      [2]   Current Facility-Administered Medications Ordered in Epic   Medication Dose Route Frequency Provider Last Rate Last Admin    lactated ringers infusion   Intravenous Continuous Fareed Jackson MD 20 mL/hr at 01/09/25 1227 New Bag at 01/09/25 1227     No current [facility]-ordered outpatient medications on file.   [3]   Allergies  Allergen Reactions    Soap RASH and OTHER (SEE COMMENTS)     Tide no dye     Skin reaction, also itching

## 2025-05-20 ENCOUNTER — TELEPHONE (OUTPATIENT)
Dept: INTERNAL MEDICINE CLINIC | Facility: CLINIC | Age: 75
End: 2025-05-20

## 2025-05-20 RX ORDER — LACTULOSE 10 G/15ML
20 SOLUTION ORAL 2 TIMES DAILY PRN
Qty: 480 ML | Refills: 3 | Status: SHIPPED | OUTPATIENT
Start: 2025-05-20

## 2025-05-20 NOTE — TELEPHONE ENCOUNTER
Patient is calling requesting a refill for a laxative that was prescribed by Dr Yousif  for the patient about 2 or 3 years ago.      Patient has been experiencing constipation for the about 7 to 8 days.    Please send script to [facility] in [place]

## 2025-05-29 ENCOUNTER — HOSPITAL ENCOUNTER (INPATIENT)
Facility: HOSPITAL | Age: 75
LOS: 4 days | Discharge: HOME | End: 2025-06-02
Attending: EMERGENCY MEDICINE | Admitting: INTERNAL MEDICINE
Payer: MEDICARE

## 2025-05-29 ENCOUNTER — HOSPITAL ENCOUNTER (INPATIENT)
Facility: HOSPITAL | Age: 75
LOS: 4 days | Discharge: HOME HEALTH | End: 2025-06-02
Attending: EMERGENCY MEDICINE | Admitting: INTERNAL MEDICINE
Payer: MEDICARE

## 2025-05-29 DIAGNOSIS — R57.8 HEMORRHAGIC SHOCK (HCC): ICD-10-CM

## 2025-05-29 DIAGNOSIS — D64.9 SYMPTOMATIC ANEMIA: ICD-10-CM

## 2025-05-29 DIAGNOSIS — K92.2 GASTROINTESTINAL HEMORRHAGE, UNSPECIFIED GASTROINTESTINAL HEMORRHAGE TYPE: Primary | ICD-10-CM

## 2025-05-29 LAB
ALBUMIN SERPL-MCNC: 3.2 G/DL (ref 3.2–4.8)
ALBUMIN/GLOB SERPL: 1.5 {RATIO} (ref 1–2)
ALP LIVER SERPL-CCNC: 43 U/L (ref 45–117)
ALT SERPL-CCNC: 13 U/L (ref 10–49)
ANION GAP SERPL CALC-SCNC: 11 MMOL/L (ref 0–18)
ANTIBODY SCREEN: NEGATIVE
APTT PPP: 25.5 SECONDS (ref 23–36)
AST SERPL-CCNC: 13 U/L (ref ?–34)
BASOPHILS # BLD AUTO: 0.08 X10(3) UL (ref 0–0.2)
BASOPHILS NFR BLD AUTO: 0.5 %
BILIRUB SERPL-MCNC: 0.5 MG/DL (ref 0.2–1.1)
BUN BLD-MCNC: 57 MG/DL (ref 9–23)
BUN/CREAT SERPL: 44.5 (ref 10–20)
CALCIUM BLD-MCNC: 8 MG/DL (ref 8.7–10.4)
CHLORIDE SERPL-SCNC: 108 MMOL/L (ref 98–112)
CO2 SERPL-SCNC: 20 MMOL/L (ref 21–32)
CREAT BLD-MCNC: 1.28 MG/DL (ref 0.7–1.3)
DEPRECATED RDW RBC AUTO: 43.6 FL (ref 35.1–46.3)
EGFRCR SERPLBLD CKD-EPI 2021: 59 ML/MIN/1.73M2 (ref 60–?)
EOSINOPHIL # BLD AUTO: 0.15 X10(3) UL (ref 0–0.7)
EOSINOPHIL NFR BLD AUTO: 1 %
ERYTHROCYTE [DISTWIDTH] IN BLOOD BY AUTOMATED COUNT: 13.4 % (ref 11–15)
GLOBULIN PLAS-MCNC: 2.1 G/DL (ref 2–3.5)
GLUCOSE BLD-MCNC: 192 MG/DL (ref 70–99)
HCT VFR BLD AUTO: 23.3 % (ref 39–53)
HGB BLD-MCNC: 7.5 G/DL (ref 13–17.5)
IMM GRANULOCYTES # BLD AUTO: 0.13 X10(3) UL (ref 0–1)
IMM GRANULOCYTES NFR BLD: 0.9 %
INR BLD: 1.25 (ref 0.8–1.2)
LYMPHOCYTES # BLD AUTO: 3.25 X10(3) UL (ref 1–4)
LYMPHOCYTES NFR BLD AUTO: 21.9 %
MCH RBC QN AUTO: 28.7 PG (ref 26–34)
MCHC RBC AUTO-ENTMCNC: 32.2 G/DL (ref 31–37)
MCV RBC AUTO: 89.3 FL (ref 80–100)
MONOCYTES # BLD AUTO: 0.65 X10(3) UL (ref 0.1–1)
MONOCYTES NFR BLD AUTO: 4.4 %
NEUTROPHILS # BLD AUTO: 10.55 X10 (3) UL (ref 1.5–7.7)
NEUTROPHILS # BLD AUTO: 10.55 X10(3) UL (ref 1.5–7.7)
NEUTROPHILS NFR BLD AUTO: 71.3 %
OSMOLALITY SERPL CALC.SUM OF ELEC: 309 MOSM/KG (ref 275–295)
PLATELET # BLD AUTO: 232 10(3)UL (ref 150–450)
POTASSIUM SERPL-SCNC: 4.7 MMOL/L (ref 3.5–5.1)
PROT SERPL-MCNC: 5.3 G/DL (ref 5.7–8.2)
PROTHROMBIN TIME: 16.4 SECONDS (ref 11.6–14.8)
RBC # BLD AUTO: 2.61 X10(6)UL (ref 3.8–5.8)
RH BLOOD TYPE: POSITIVE
RH BLOOD TYPE: POSITIVE
SODIUM SERPL-SCNC: 139 MMOL/L (ref 136–145)
WBC # BLD AUTO: 14.8 X10(3) UL (ref 4–11)

## 2025-05-29 PROCEDURE — 99223 1ST HOSP IP/OBS HIGH 75: CPT | Performed by: INTERNAL MEDICINE

## 2025-05-29 PROCEDURE — 30233N1 TRANSFUSION OF NONAUTOLOGOUS RED BLOOD CELLS INTO PERIPHERAL VEIN, PERCUTANEOUS APPROACH: ICD-10-PCS | Performed by: HOSPITALIST

## 2025-05-29 RX ORDER — HYDROCODONE BITARTRATE AND ACETAMINOPHEN 5; 325 MG/1; MG/1
1 TABLET ORAL EVERY 4 HOURS PRN
Status: DISCONTINUED | OUTPATIENT
Start: 2025-05-29 | End: 2025-06-02

## 2025-05-29 RX ORDER — METOCLOPRAMIDE HYDROCHLORIDE 5 MG/ML
5 INJECTION INTRAMUSCULAR; INTRAVENOUS EVERY 8 HOURS PRN
Status: DISCONTINUED | OUTPATIENT
Start: 2025-05-29 | End: 2025-06-02

## 2025-05-29 RX ORDER — HYDROCODONE BITARTRATE AND ACETAMINOPHEN 5; 325 MG/1; MG/1
2 TABLET ORAL EVERY 4 HOURS PRN
Status: DISCONTINUED | OUTPATIENT
Start: 2025-05-29 | End: 2025-06-02

## 2025-05-29 RX ORDER — SODIUM CHLORIDE 9 MG/ML
INJECTION, SOLUTION INTRAVENOUS CONTINUOUS
Status: DISCONTINUED | OUTPATIENT
Start: 2025-05-29 | End: 2025-06-02

## 2025-05-29 RX ORDER — ACETAMINOPHEN 325 MG/1
650 TABLET ORAL EVERY 4 HOURS PRN
Status: DISCONTINUED | OUTPATIENT
Start: 2025-05-29 | End: 2025-06-02

## 2025-05-29 RX ORDER — ATORVASTATIN CALCIUM 40 MG/1
40 TABLET, FILM COATED ORAL DAILY
Status: DISCONTINUED | OUTPATIENT
Start: 2025-05-29 | End: 2025-06-02

## 2025-05-29 RX ORDER — ACETAMINOPHEN 500 MG
500 TABLET ORAL EVERY 4 HOURS PRN
Status: DISCONTINUED | OUTPATIENT
Start: 2025-05-29 | End: 2025-06-02

## 2025-05-29 RX ORDER — ONDANSETRON 2 MG/ML
4 INJECTION INTRAMUSCULAR; INTRAVENOUS EVERY 6 HOURS PRN
Status: DISCONTINUED | OUTPATIENT
Start: 2025-05-29 | End: 2025-06-02

## 2025-05-29 NOTE — H&P
[facility]  part of [facility]  HISTORY AND PHYSICAL       Benjamin Rachel Jr. Patient Status:  Emergency    1950  75 year old CSN [id number]   Location 46/46 Attending Cristóbal Bower MD     PCP Cole Yousif MD         DATE OF ADMISSION: 2025     CHIEF COMPLAINT: Diarrhea    HISTORY OF PRESENT ILLNESS  This is a 75 year oldmale who presented complaining of diarrhea.  Patient stated symptoms started on the evening prior to admission.  Woke him from sleep.  Described multiple episodes up to 17 times overnight.  Eventually he began feeling generally weak and fatigued.  At one point he felt off balance and had a fall.  Upon further review, patient did note dark black stools.  Patient denied associated abdominal discomfort.  Patient states he is otherwise been in normal state of health this past week.  Denied recent NSAID use.  Patient takes low-dose aspirin daily.  Patient denies other anticoagulation.  At time of interview, patient reports diarrhea decreasing in frequency.  Feeling generally better.  Denies current chest pain, shortness of breath, nausea vomiting, fevers or chills.    PAST MEDICAL HISTORY   Past Medical History[1]     PAST SURGICAL HISTORY  Past Surgical History[2]    ALLERGIES   Soap    MEDICATIONS  Patient's Medications   New Prescriptions    No medications on file   Previous Medications    AMLODIPINE 10 MG ORAL TAB    Take 1 tablet (10 mg total) by mouth daily.    ASPIRIN LOW DOSE 81 MG ORAL CHEW TAB    Chew 1 tablet (81 mg total) by mouth daily.    ATORVASTATIN 40 MG ORAL TAB    Take 1 tablet (40 mg total) by mouth daily.    DIAPER RASH PRODUCTS (CERAVE BABY HEALING OINTMENT EX)    Apply topically.    LACTULOSE 10 GM/15ML ORAL SOLUTION    Take 30 mL (20 g total) by mouth 2 (two) times daily as needed (constipation).   Modified Medications    No medications on file   Discontinued Medications    No medications on file       SOCIAL HISTORY  Social Hx on  file[3]    FAMILY HISTORY  Family History[4]    PHYSICAL EXAM  Vital signs:  /49   Pulse 76   Temp 97.7 °F (36.5 °C) (Temporal)   Resp 19   Ht 6' 1\" (1.854 m)   Wt 230 lb (104.3 kg)   SpO2 99%   BMI 30.34 kg/m²      GENERAL:  Awake and alert, in no acute distress.  HEART:  Regular rhythm.  Regular rate   LUNGS:  Air entry was good.  No increased work of breathing or wheezes   ABDOMEN: Soft and non-tender.    EXTREMITIES: Trace bilateral extremity edema appreciated  SKIN: Chronic venous stasis changes of bilateral extremities  PSYCHIATRIC: Normal mood      IMAGING    No results found.     Data:  Recent Labs   Lab 05/29/25  1713   RBC 2.61*   HGB 7.5*   HCT 23.3*   MCV 89.3   MCH 28.7   MCHC 32.2   RDW 13.4   NEPRELIM 10.55*   WBC 14.8*   .0     Recent Labs   Lab 05/29/25  1713   *   BUN 57*   CREATSERUM 1.28   CA 8.0*   ALB 3.2      K 4.7      CO2 20.0*   ALKPHO 43*   AST 13   ALT 13   BILT 0.5   TP 5.3*       ASSESSMENT/PLAN      Gastrointestinal hemorrhage, unspecified gastrointestinal hemorrhage type  Acute blood loss anemia  - Patient presented with multiple episodes of diarrhea with dark black stools  - Hemoglobin noted to be decreased to 7.5.  - Receiving PRBCs in ED.  - Starting IV Protonix and IV fluids  - Keep n.p.o.  - Symptom relief as able  -Close monitoring of hemoglobin, transfusing for goal greater than 7.  - GI consulted, appreciate further recommendations    History of hypertension  - Currently normotensive  - With recent GI bleeding, holding antihypertensive agents at this time  - Continue to monitor and add agents as needed    Hyperlipidemia  -Statin      Plan of care discussed with patient and family at bedside.  Discussed with ED physician and RN. Decision made that pt needs hospitalization for further management/monitoring.      Sammy Martins MD    This note was prepared using Dragon Medical voice recognition dictation software. As a result errors may  occur. When identified these errors have been corrected. While every attempt is made to correct errors during dictation discrepancies may still exist         [1]   Past Medical History:   BPH (benign prostatic hyperplasia)    Chronic venous insufficiency of lower extremity    Contact dermatitis    to legs; since 1988    Essential hypertension    High blood pressure    High cholesterol    History of right inguinal hernia repair    age 7     Nasal polyps    ENT Dr Quintana    PAD (peripheral artery disease)    S/P LASIK surgery of both eyes    Toe deformity    since 1988   [2]   Past Surgical History:  Procedure Laterality Date    Colonoscopy N/A 8/10/2021    Procedure: COLONOSCOPY;  Surgeon: Fareed Jackson MD;  Location: [facility] ENDOSCOPY    Colonoscopy N/A 1/9/2025    Procedure: COLONOSCOPY;  Surgeon: Fareed Jackson MD;  Location: [facility] ENDOSCOPY    Excision turbinate,submucous  12/27/2019    Eye surgery      Hernia surgery      Nasal scope,bx/rmv polyp/debrid  12/27/2019    Nasal scopy,explor frontal sinus  12/27/2019    Nasal scopy,remv part ethmoid  12/27/2019    Nasal scopy,remv tiss sphenoid  12/27/2019    Nasal scopy,rmv tiss maxill sinus  12/27/2019   [3]   Social History  Socioeconomic History    Marital status: Single   Tobacco Use    Smoking status: Never    Smokeless tobacco: Never   Vaping Use    Vaping status: Never Used   Substance and Sexual Activity    Alcohol use: No     Comment: Over 40 yrs    Drug use: Never   [4]   Family History  Problem Relation Age of Onset    No Known Problems Father     No Known Problems Mother     Diabetes Sister

## 2025-05-29 NOTE — ED INITIAL ASSESSMENT (HPI)
Pt brought to ed by ems for gi bleeding. Pt states he has bee having diarrhea since midnight and he has been having leg weakness x 1 hr. Per ems, when they arrived pt was on the toilet and they noticed bloody diarrhea, when pt stood up he became pale and diaphoretic.  Pt denies blood in stool. + baby asa    Pt aox4, speaking in full sentences. Pt appears pale

## 2025-05-29 NOTE — ED PROVIDER NOTES
Patient Seen in: [facility] Emergency Department        History  Chief Complaint   Patient presents with    GI Bleeding     Stated Complaint: diarrhea    Subjective:   HPI            75-year-old male on aspirin with listed medical history who started with diarrhea overnight.  He has had multiple episodes today.  He denies pain.  He stating lightheaded and dizzy initially fell twice.  No injuries but had trouble getting up.  On EMS arrival they noted blood in the toilet reportedly.  No history of GI bleed.  Had routine colonoscopy in January of this year.      Objective:     Past Medical History:    BPH (benign prostatic hyperplasia)    Chronic venous insufficiency of lower extremity    Contact dermatitis    to legs; since 1988    Essential hypertension    High blood pressure    High cholesterol    History of right inguinal hernia repair    age 7     Nasal polyps    ENT Dr Mariano COFFMAN (peripheral artery disease)    S/P LASIK surgery of both eyes    Toe deformity    since 1988              Past Surgical History:   Procedure Laterality Date    Colonoscopy N/A 8/10/2021    Procedure: COLONOSCOPY;  Surgeon: Fareed Jackson MD;  Location: [facility] ENDOSCOPY    Colonoscopy N/A 1/9/2025    Procedure: COLONOSCOPY;  Surgeon: Fareed Jackson MD;  Location: [facility] ENDOSCOPY    Excision turbinate,submucous  12/27/2019    Eye surgery      Hernia surgery      Nasal scope,bx/rmv polyp/debrid  12/27/2019    Nasal scopy,explor frontal sinus  12/27/2019    Nasal scopy,remv part ethmoid  12/27/2019    Nasal scopy,remv tiss sphenoid  12/27/2019    Nasal scopy,rmv tiss maxill sinus  12/27/2019                Social History     Socioeconomic History    Marital status: Single   Tobacco Use    Smoking status: Never    Smokeless tobacco: Never   Vaping Use    Vaping status: Never Used   Substance and Sexual Activity    Alcohol use: No     Comment: Over 40 yrs    Drug use: Never                                Physical  Exam    ED Triage Vitals [05/29/25 1714]   BP (!) 88/44   Pulse 97   Resp 22   Temp 97.7 °F (36.5 °C)   Temp src Temporal   SpO2 100 %   O2 Device None (Room air)       Current Vitals:   Vital Signs  BP: 108/46  Pulse: 83  Resp: 14  Temp: 97.7 °F (36.5 °C)  Temp src: Temporal  MAP (mmHg): 65    Oxygen Therapy  SpO2: 100 %  O2 Device: None (Room air)            Physical Exam  Constitutional: Oriented to person, place, and time.  Appears well-developed. No distress.   Head: Normocephalic and atraumatic.   Eyes: Conjunctivae are normal. Pupils are equal, round, and reactive to light.   Neck: Normal range of motion. Neck supple.   Cardiovascular: Normal rate, regular rhythm and intact distal pulses.    Pulmonary/Chest: Effort normal. No respiratory distress.   Abdominal: Soft. There is no tenderness. There is no guarding.  Melena on exam.  Musculoskeletal: Normal range of motion. No edema or tenderness.   Neurological: Alert and oriented to person, place, and time.   Skin: Skin is warm and dry.   Psychiatric: Normal mood and affect.  Behavior is normal.   Nursing note and vitals reviewed.    Differential diagnosis includes GI bleed and symptomatic anemia.          ED Course  Labs Reviewed   CBC WITH DIFFERENTIAL WITH PLATELET - Abnormal; Notable for the following components:       Result Value    WBC 14.8 (*)     RBC 2.61 (*)     HGB 7.5 (*)     HCT 23.3 (*)     Neutrophil Absolute Prelim 10.55 (*)     Neutrophil Absolute 10.55 (*)     All other components within normal limits   COMP METABOLIC PANEL (14) - Abnormal; Notable for the following components:    Glucose 192 (*)     CO2 20.0 (*)     BUN 57 (*)     BUN/CREA Ratio 44.5 (*)     Calcium, Total 8.0 (*)     Calculated Osmolality 309 (*)     eGFR-Cr 59 (*)     Alkaline Phosphatase 43 (*)     Total Protein 5.3 (*)     All other components within normal limits   PROTHROMBIN TIME (PT) - Abnormal; Notable for the following components:    PT 16.4 (*)     INR 1.25 (*)      All other components within normal limits   PTT, ACTIVATED - Normal   TYPE AND SCREEN    Narrative:     The following orders were created for panel order Type and screen.  Procedure                               Abnormality         Status                     ---------                               -----------         ------                     ABORH (Blood Type)[[id number]]                               In process                 Antibody Screen[[id number]]                                  In process                   Please view results for these tests on the individual orders.   PREPARE RBC   ABORH (BLOOD TYPE)   ANTIBODY SCREEN   ABORH CONFIRMATION   RAINBOW DRAW LAVENDER   RAINBOW DRAW LIGHT GREEN   RAINBOW DRAW BLUE     EKG    Rate, intervals and axes as noted on EKG Report.  Rate: 90  Rhythm: Sinus Rhythm  Reading: No gross acute ischemic changes, likely LVH changes                                MDM         Admission disposition: 5/29/2025  6:17 PM           Medical Decision Making  BP improved with just some fluids.  He is dropped half his hemoglobin on last hemoglobin available in November.  I will give him 2 units of blood now.  Protonix.  NPO.  PCU admission.  I messaged GI and the hospitalist.  Patient is otherwise agreeable to admission and stable.    Problems Addressed:  Gastrointestinal hemorrhage, unspecified gastrointestinal hemorrhage type: acute illness or injury with systemic symptoms that poses a threat to life or bodily functions  Hemorrhagic shock (HCC): acute illness or injury that poses a threat to life or bodily functions  Symptomatic anemia: acute illness or injury with systemic symptoms    Amount and/or Complexity of Data Reviewed  External Data Reviewed: ECG.     Details: 11/12/22 EKG reviewed, no sig change  Labs: ordered. Decision-making details documented in ED Course.  ECG/medicine tests: ordered and independent interpretation performed. Decision-making details documented in ED  Course.    Risk  Decision regarding hospitalization.    Critical Care  Total time providing critical care: minutes (I spent a total of 35 of critical care time in obtaining history, performing a physical exam, bedside monitoring of interventions, collecting and interpreting tests and discussion with consultants but not including time spent performing procedures.)        Disposition and Plan     Clinical Impression:  1. Gastrointestinal hemorrhage, unspecified gastrointestinal hemorrhage type    2. Symptomatic anemia    3. Hemorrhagic shock (HCC)         Disposition:  Admit  5/29/2025  6:17 pm    Follow-up:  No follow-up provider specified.  We recommend that you schedule follow up care with a primary care provider within the next three months to obtain basic health screening including reassessment of your blood pressure.      Medications Prescribed:  Current Discharge Medication List                Supplementary Documentation:         Hospital Problems       Present on Admission  Date Reviewed: 11/13/2024          ICD-10-CM Noted POA    * (Principal) Gastrointestinal hemorrhage, unspecified gastrointestinal hemorrhage type K92.2 5/29/2025 Unknown

## 2025-05-29 NOTE — ED QUICK NOTES
Orders for admission, patient is aware of plan and ready to go upstairs. Any questions, please call ED RN jordana at extension [phone].     Patient Covid vaccination status: Fully vaccinated     COVID Test Ordered in ED: None    COVID Suspicion at Admission: N/A    Running Infusions: Medication Infusions[1]     Mental Status/LOC at time of transport: aox4    Other pertinent information:   CIWA score: N/A   NIH score:  N/A             [1]

## 2025-05-30 ENCOUNTER — ANESTHESIA (OUTPATIENT)
Dept: ENDOSCOPY | Facility: HOSPITAL | Age: 75
End: 2025-05-30
Payer: MEDICARE

## 2025-05-30 ENCOUNTER — ANESTHESIA EVENT (OUTPATIENT)
Dept: ENDOSCOPY | Facility: HOSPITAL | Age: 75
End: 2025-05-30
Payer: MEDICARE

## 2025-05-30 LAB
ADENOVIRUS F 40/41 PCR: NEGATIVE
ASTROVIRUS PCR: NEGATIVE
ATRIAL RATE: 90 BPM
C CAYETANENSIS DNA SPEC QL NAA+PROBE: NEGATIVE
C DIFF TOX B STL QL: NEGATIVE
CAMPY SP DNA.DIARRHEA STL QL NAA+PROBE: NEGATIVE
CRYPTOSP DNA SPEC QL NAA+PROBE: NEGATIVE
DEPRECATED HBV CORE AB SER IA-ACNC: 60 NG/ML (ref 50–336)
EAEC PAA PLAS AGGR+AATA ST NAA+NON-PRB: NEGATIVE
EC STX1+STX2 + H7 FLIC SPEC NAA+PROBE: NEGATIVE
ENTAMOEBA HISTOLYTICA PCR: NEGATIVE
EPEC EAE GENE STL QL NAA+NON-PROBE: NEGATIVE
ETEC LTA+ST1A+ST1B TOX ST NAA+NON-PROBE: NEGATIVE
GIARDIA LAMBLIA PCR: NEGATIVE
HGB BLD-MCNC: 8.6 G/DL (ref 13–17.5)
HGB BLD-MCNC: 8.8 G/DL (ref 13–17.5)
HGB BLD-MCNC: 9.2 G/DL (ref 13–17.5)
IRON SATN MFR SERPL: 28 % (ref 20–50)
IRON SERPL-MCNC: 88 UG/DL (ref 65–175)
NOROVIRUS GI/GII PCR: NEGATIVE
P AXIS: 44 DEGREES
P SHIGELLOIDES DNA STL QL NAA+PROBE: NEGATIVE
P-R INTERVAL: 166 MS
Q-T INTERVAL: 358 MS
QRS DURATION: 94 MS
QTC CALCULATION (BEZET): 437 MS
R AXIS: -38 DEGREES
ROTAVIRUS A PCR: NEGATIVE
SALMONELLA DNA SPEC QL NAA+PROBE: NEGATIVE
SAPOVIRUS PCR: NEGATIVE
SHIGELLA SP+EIEC IPAH ST NAA+NON-PROBE: NEGATIVE
T AXIS: 36 DEGREES
TOTAL IRON BINDING CAPACITY: 312 UG/DL (ref 250–425)
TRANSFERRIN SERPL-MCNC: 232 MG/DL (ref 215–365)
V CHOLERAE DNA SPEC QL NAA+PROBE: NEGATIVE
VENTRICULAR RATE: 90 BPM
VIBRIO DNA SPEC NAA+PROBE: NEGATIVE
YERSINIA DNA SPEC NAA+PROBE: NEGATIVE

## 2025-05-30 PROCEDURE — 99233 SBSQ HOSP IP/OBS HIGH 50: CPT | Performed by: INTERNAL MEDICINE

## 2025-05-30 PROCEDURE — 99223 1ST HOSP IP/OBS HIGH 75: CPT | Performed by: INTERNAL MEDICINE

## 2025-05-30 PROCEDURE — 43239 EGD BIOPSY SINGLE/MULTIPLE: CPT | Performed by: INTERNAL MEDICINE

## 2025-05-30 PROCEDURE — 0DB68ZX EXCISION OF STOMACH, VIA NATURAL OR ARTIFICIAL OPENING ENDOSCOPIC, DIAGNOSTIC: ICD-10-PCS | Performed by: INTERNAL MEDICINE

## 2025-05-30 RX ORDER — NALOXONE HYDROCHLORIDE 0.4 MG/ML
0.08 INJECTION, SOLUTION INTRAMUSCULAR; INTRAVENOUS; SUBCUTANEOUS ONCE AS NEEDED
Status: ACTIVE | OUTPATIENT
Start: 2025-05-30 | End: 2025-05-30

## 2025-05-30 RX ORDER — PHENYLEPHRINE HCL 10 MG/ML
VIAL (ML) INJECTION AS NEEDED
Status: DISCONTINUED | OUTPATIENT
Start: 2025-05-30 | End: 2025-05-30 | Stop reason: SURG

## 2025-05-30 RX ORDER — SODIUM CHLORIDE, SODIUM LACTATE, POTASSIUM CHLORIDE, CALCIUM CHLORIDE 600; 310; 30; 20 MG/100ML; MG/100ML; MG/100ML; MG/100ML
INJECTION, SOLUTION INTRAVENOUS CONTINUOUS PRN
Status: DISCONTINUED | OUTPATIENT
Start: 2025-05-30 | End: 2025-05-30 | Stop reason: SURG

## 2025-05-30 RX ORDER — SODIUM CHLORIDE, SODIUM LACTATE, POTASSIUM CHLORIDE, CALCIUM CHLORIDE 600; 310; 30; 20 MG/100ML; MG/100ML; MG/100ML; MG/100ML
INJECTION, SOLUTION INTRAVENOUS CONTINUOUS
Status: DISCONTINUED | OUTPATIENT
Start: 2025-05-30 | End: 2025-05-30

## 2025-05-30 RX ORDER — LIDOCAINE HYDROCHLORIDE 10 MG/ML
INJECTION, SOLUTION EPIDURAL; INFILTRATION; INTRACAUDAL; PERINEURAL AS NEEDED
Status: DISCONTINUED | OUTPATIENT
Start: 2025-05-30 | End: 2025-05-30 | Stop reason: SURG

## 2025-05-30 RX ADMIN — PHENYLEPHRINE HCL 100 MCG: 10 MG/ML VIAL (ML) INJECTION at 14:53:00

## 2025-05-30 RX ADMIN — PHENYLEPHRINE HCL 100 MCG: 10 MG/ML VIAL (ML) INJECTION at 14:40:00

## 2025-05-30 RX ADMIN — LIDOCAINE HYDROCHLORIDE 50 MG: 10 INJECTION, SOLUTION EPIDURAL; INFILTRATION; INTRACAUDAL; PERINEURAL at 14:37:00

## 2025-05-30 RX ADMIN — SODIUM CHLORIDE, SODIUM LACTATE, POTASSIUM CHLORIDE, CALCIUM CHLORIDE: 600; 310; 30; 20 INJECTION, SOLUTION INTRAVENOUS at 14:24:00

## 2025-05-30 RX ADMIN — PHENYLEPHRINE HCL 100 MCG: 10 MG/ML VIAL (ML) INJECTION at 14:42:00

## 2025-05-30 NOTE — CM/SW NOTE
05/30/25 0900   CM/SW Referral Data   Referral Source    Reason for Referral Discharge planning   Informant Patient   Medical Hx   Does patient have an established PCP? Yes  (Cole Yousif)   Patient Info   Patient's Current Mental Status at Time of Assessment Alert;Oriented   Patient's Home Environment House   Number of Levels in Home 1   Number of Stair in Home 2 stairs to enter   Patient lives with Sibling   Patient Status Prior to Admission   Independent with ADLs and Mobility Yes   Discharge Needs   Anticipated D/C needs   (To be determined)     Dx: Gastrointestinal hemorrhage     Met with patient at bedside. Patient was alert, oriented, and able to answer questions.    Verified home address. Patient lives at home with his sister.    No DME in the home. Patient was independent with ADLs.     PT/OT evals requested for discharge recommendations.    Plan: TBD    / to remain available for support and/or discharge planning     Maira DILLARD RN   Nurse

## 2025-05-30 NOTE — PLAN OF CARE
Patient went for EGD, duodenal ulcer found with no intervention needed. Call light within reach, safety measures maintained. Transfer order in.     Problem: Patient Centered Care  Goal: Patient preferences are identified and integrated in the patient's plan of care  Description: Interventions:  - What would you like us to know as we care for you? My feet are very sensitive.   - Provide timely, complete, and accurate information to patient/family  - Incorporate patient and family knowledge, values, beliefs, and cultural backgrounds into the planning and delivery of care  - Encourage patient/family to participate in care and decision-making at the level they choose  - Honor patient and family perspectives and choices  Outcome: Progressing     Problem: PAIN - ADULT  Goal: Verbalizes/displays adequate comfort level or patient's stated pain goal  Description: INTERVENTIONS:  - Encourage pt to monitor pain and request assistance  - Assess pain using appropriate pain scale  - Administer analgesics based on type and severity of pain and evaluate response  - Implement non-pharmacological measures as appropriate and evaluate response  - Consider cultural and social influences on pain and pain management  - Manage/alleviate anxiety  - Utilize distraction and/or relaxation techniques  - Monitor for opioid side effects  - Notify MD/LIP if interventions unsuccessful or patient reports new pain  - Anticipate increased pain with activity and pre-medicate as appropriate  Outcome: Progressing     Problem: GASTROINTESTINAL - ADULT  Goal: Minimal or absence of nausea and vomiting  Description: INTERVENTIONS:  - Maintain adequate hydration with IV or PO as ordered and tolerated  - Nasogastric tube to low intermittent suction as ordered  - Evaluate effectiveness of ordered antiemetic medications  - Provide nonpharmacologic comfort measures as appropriate  - Advance diet as tolerated, if ordered  - Obtain nutritional consult as needed  -  Evaluate fluid balance  Outcome: Progressing  Goal: Maintains or returns to baseline bowel function  Description: INTERVENTIONS:  - Assess bowel function  - Maintain adequate hydration with IV or PO as ordered and tolerated  - Evaluate effectiveness of GI medications  - Encourage mobilization and activity  - Obtain nutritional consult as needed  - Establish a toileting routine/schedule  - Consider collaborating with pharmacy to review patient's medication profile  Outcome: Progressing

## 2025-05-30 NOTE — ANESTHESIA PREPROCEDURE EVALUATION
Anesthesia PreOp Note    HPI:     Benjamin Rachel Jr. is a 75 year old male who presents for preoperative consultation requested by: Clarisa Vuong MD    Date of Surgery: 5/29/2025 - 5/30/2025    Procedure(s):  ESOPHAGOGASTRODUODENOSCOPY (EGD)  Indication: melena    Relevant Problems   No relevant active problems       NPO:  Last Liquid Consumption Date: 05/29/25  Last Liquid Consumption Time: 1500  Last Solid Consumption Date: 05/29/25  Last Solid Consumption Time: 1200  Last Liquid Consumption Date: 05/29/25          History Review:  Patient Active Problem List    Diagnosis Date Noted   • Gastrointestinal hemorrhage, unspecified gastrointestinal hemorrhage type 05/29/2025   • Symptomatic anemia 05/29/2025   • Hemorrhagic shock (HCC) 05/29/2025   • History of colon polyps 01/09/2025   • Pulsatile tinnitus of left ear 03/19/2021       Past Medical History[1]    Past Surgical History[2]    Prescriptions Prior to Admission[3]  Current Medications and Prescriptions Ordered in Epic[4]    Allergies[5]    Family History[6]  Social Hx on file[7]    Available pre-op labs reviewed.  Lab Results   Component Value Date    WBC 14.8 (H) 05/29/2025    RBC 2.61 (L) 05/29/2025    HGB 8.6 (L) 05/30/2025    HCT 23.3 (L) 05/29/2025    MCV 89.3 05/29/2025    MCH 28.7 05/29/2025    MCHC 32.2 05/29/2025    RDW 13.4 05/29/2025    .0 05/29/2025     Lab Results   Component Value Date     05/29/2025    K 4.7 05/29/2025     05/29/2025    CO2 20.0 (L) 05/29/2025    BUN 57 (H) 05/29/2025    CREATSERUM 1.28 05/29/2025     (H) 05/29/2025    CA 8.0 (L) 05/29/2025     Lab Results   Component Value Date    INR 1.25 (H) 05/29/2025       Vital Signs:  Body mass index is 30.4 kg/m².   height is 1.854 m (6' 1\") and weight is 104.5 kg (230 lb 6.1 oz). His temporal temperature is 99.1 °F (37.3 °C). His blood pressure is 123/53 and his pulse is 88. His respiration is 23 and oxygen saturation is 97%.   Vitals:    05/30/25 1100  05/30/25 1200 05/30/25 1300 05/30/25 1320   BP: 112/57 117/65  123/53   Pulse: 73 77 92 88   Resp: 15 12 13 23   Temp:  99.1 °F (37.3 °C)     TempSrc:  Temporal     SpO2: 99% 100% 97% 97%   Weight:       Height:            Anesthesia Evaluation     Patient summary reviewed    Airway   Mallampati: III  TM distance: >3 FB  Neck ROM: full  Dental - Dentition appears grossly intact     Pulmonary - negative ROS and normal exam   Cardiovascular - normal exam  (+) hypertension    NYHA Classification: III  Rhythm: regular  Rate: normal    Neuro/Psych - negative ROS     GI/Hepatic/Renal - negative ROS     Endo/Other - negative ROS   Abdominal  - normal exam  (+) obese               Anesthesia Plan:   ASA:  3  Plan:   MAC  Informed Consent Plan and Risks Discussed With:  Patient  Discussed plan with:  CRNA    I have informed Benjamin Rachel Jr. and/or legal guardian or family member of the nature of the anesthetic plan, benefits, risks including possible dental damage if relevant, major complications, and any alternative forms of anesthetic management.   All of the patient's questions were answered to the best of my ability. The patient desires the anesthetic management as planned.  Brandon Solis CRNA  5/30/2025 2:07 PM  Present on Admission:  **None**             [1]  Past Medical History:  • BPH (benign prostatic hyperplasia)   • Chronic venous insufficiency of lower extremity   • Contact dermatitis    to legs; since 1988   • Essential hypertension   • High blood pressure   • High cholesterol   • History of right inguinal hernia repair    age 7    • Nasal polyps    ENT Dr Quintana   • PAD (peripheral artery disease)   • S/P LASIK surgery of both eyes   • Toe deformity    since 1988   [2]  Past Surgical History:  Procedure Laterality Date   • Colonoscopy N/A 8/10/2021    Procedure: COLONOSCOPY;  Surgeon: Fareed Jackson MD;  Location: [facility] ENDOSCOPY   • Colonoscopy N/A 1/9/2025    Procedure: COLONOSCOPY;  Surgeon:  Fareed Jackson MD;  Location: [facility] ENDOSCOPY   • Excision turbinate,submucous  12/27/2019   • Eye surgery     • Hernia surgery     • Nasal scope,bx/rmv polyp/debrid  12/27/2019   • Nasal scopy,explor frontal sinus  12/27/2019   • Nasal scopy,remv part ethmoid  12/27/2019   • Nasal scopy,remv tiss sphenoid  12/27/2019   • Nasal scopy,rmv tiss maxill sinus  12/27/2019   [3]  Medications Prior to Admission   Medication Sig Dispense Refill Last Dose/Taking   • atorvastatin 40 MG Oral Tab Take 1 tablet (40 mg total) by mouth daily. 90 tablet 3 5/28/2025 at  4:00 PM   • amLODIPine 10 MG Oral Tab Take 1 tablet (10 mg total) by mouth daily. 90 tablet 3 5/28/2025 at  4:00 PM   • ASPIRIN LOW DOSE 81 MG Oral Chew Tab Chew 1 tablet (81 mg total) by mouth in the evening.   5/28/2025 at  4:00 PM   • lactulose 10 GM/15ML Oral Solution Take 30 mL (20 g total) by mouth 2 (two) times daily as needed (constipation). (Patient not taking: Reported on 5/29/2025) 480 mL 3 More than a month   [4]  Current Facility-Administered Medications Ordered in Epic   Medication Dose Route Frequency Provider Last Rate Last Admin   • atorvastatin (Lipitor) tab 40 mg  40 mg Oral Daily Sammy Martins MD       • pantoprazole (Protonix) 40 mg in sodium chloride 0.9% PF 10 mL IV push  40 mg Intravenous Q12H Sammy Martins MD   40 mg at 05/30/25 0340   • sodium chloride 0.9% infusion   Intravenous Continuous Sammy Martins MD 83 mL/hr at 05/30/25 1128 New Bag at 05/30/25 1128   • acetaminophen (Tylenol Extra Strength) tab 500 mg  500 mg Oral Q4H PRN Sammy Martins MD       • acetaminophen (Tylenol) tab 650 mg  650 mg Oral Q4H PRN Sammy Martins MD        Or   • HYDROcodone-acetaminophen (Norco) 5-325 MG per tab 1 tablet  1 tablet Oral Q4H PRN Sammy Martins MD        Or   • HYDROcodone-acetaminophen (Norco) 5-325 MG per tab 2 tablet  2 tablet Oral Q4H PRN Sammy Martins MD       • ondansetron (Zofran) 4 MG/2ML  injection 4 mg  4 mg Intravenous Q6H PRN Sammy Martins MD       • metoclopramide (Reglan) 5 mg/mL injection 5 mg  5 mg Intravenous Q8H PRN Sammy Martins MD   5 mg at 05/30/25 1239     No current Epic-ordered outpatient medications on file.   [5]  Allergies  Allergen Reactions   • Soap RASH and OTHER (SEE COMMENTS)     Tide no dye     Skin reaction, also itching   [6]  Family History  Problem Relation Age of Onset   • No Known Problems Father    • No Known Problems Mother    • Diabetes Sister    [7]  Social History  Socioeconomic History   • Marital status: Single   Tobacco Use   • Smoking status: Never   • Smokeless tobacco: Never   Vaping Use   • Vaping status: Never Used   Substance and Sexual Activity   • Alcohol use: No     Comment: Over 40 yrs   • Drug use: Never

## 2025-05-30 NOTE — ED QUICK NOTES
Pt made a bowel movement. Stool appears to be black and tarry, ERMD made aware. Pt cleaned up and sheets changed.

## 2025-05-30 NOTE — PROGRESS NOTES
[facility]  part of [facility]     Hospitalist Progress Note     Benjamin Rachel Jr. Patient Status:  Inpatient    1950 MRN [id number]   Location [facility] 2W/SW Attending Eder Goncalves MD   Hosp Day # 1 PCP Cole Yousif MD     Subjective:     Patient laying in bed and in NAD. He continues to feel weak but states he feels slightly better than he did on admission.   Continues to have diarrhea at this time.   No overnight events reported by the nursing staff.   He denied any chest pain, n/v/d, vision changes or any other complaints at the time of interview.       Objective:    Review of Systems:   ROS completed; pertinent positive and negatives stated in subjective.      Vital signs:  Temp:  [97.7 °F (36.5 °C)-98.6 °F (37 °C)] 97.9 °F (36.6 °C)  Pulse:  [] 85  Resp:  [14-24] 17  BP: ()/(32-96) 111/49  SpO2:  [90 %-100 %] 90 %      Physical Exam:    Gen: NAD AO x3  Chest: good air entry CTABL  CVS: normal s1 and s2 RR  Abd: NABS soft NT ND  Neuro: CN 2-12 grossly intact  Ext: no edema in bilateral LE      Diagnostic Data:    Labs:  Recent Labs   Lab 25  1713 25  0318   WBC 14.8*  --    HGB 7.5* 8.8*   MCV 89.3  --    .0  --    INR 1.25*  --        Recent Labs   Lab 25  1713   *   BUN 57*   CREATSERUM 1.28   CA 8.0*   ALB 3.2      K 4.7      CO2 20.0*   ALKPHO 43*   AST 13   ALT 13   BILT 0.5   TP 5.3*       Estimated Creatinine Clearance: 58.1 mL/min (based on SCr of 1.28 mg/dL).    Recent Labs   Lab 25  1713   PTP 16.4*   INR 1.25*              Imaging: Imaging data reviewed in Epic.    Medications: Scheduled Medications[1]    Assessment & Plan:     GI bleed  Acute blood loss anemia  Diarrhea  Multiple episodes of diarrhea with black/dark stools at home  Hb 7.5 on admission, baseline ~15 per chart review  Started on IVF, IV Protonix BID  Transfused 1U pRBC  Monitor H&H, transfuse as indicated  GI on  consult  Appreciate recs  Supportive care  HTN  BP well controlled  Hold home meds at this time  Monitor vitals and titrate meds as indicated  HLD  Continue statin  Hx of colon polyps and sigmoid diverticulosis  Diagnosed on colonoscopy several years ago  Repeat CLN done in Jan 2025  Regular surveillance in 5 years  Opt GI follow up      Plan of care discussed with patient or family at bedside.      Supplementary Documentation:     Quality:  DVT Prophylaxis: SCDs  CODE status: Full       Estimated date of discharge: TBD  Discharge is dependent on: clinical stability  At this point Mr. Rachel is expected to be discharge to: home             **Certification      PHYSICIAN Certification of Need for Inpatient Hospitalization - Initial Certification    Patient will require inpatient services that will reasonably be expected to span two midnight's based on the clinical documentation in H+P.   Based on patients current state of illness, I anticipate that, after discharge, patient will require TBD.      Eder Goncalves MD  Hospitalist    MDM: High, I personally reviewed the available laboratories, imaging. I discussed the case with RN. I ordered laboratories, studies including AM labs.  Medical decision making high, risk is high.       The 21st Century Cures Act makes medical notes like these available to patients in the interest of transparency. Please be advised this is a medical document. Medical documents are intended to carry relevant information, facts as evident, and the clinical opinion of the practitioner. The medical note is intended as peer to peer communication and may appear blunt or direct. It is written in medical language and may contain abbreviations or verbiage that are unfamiliar.        [1]    atorvastatin  40 mg Oral Daily    pantoprazole  40 mg Intravenous Q12H

## 2025-05-30 NOTE — DISCHARGE INSTRUCTIONS
Home Care Instructions for Gastroscopy with Sedation    Diet:  - Resume your regular diet as tolerated unless otherwise instructed.  - Start with light meals to minimize bloating.  - Do not drink alcohol today.    Medication:  - If you have questions about resuming your normal medications, please contact your Primary Care Physician.    Activities:  - Take it easy today. Do not return to work today.  - Do not drive today.  - Do not operate any machinery today (including kitchen equipment).    Gastroscopy:  - You may have a sore throat for 2-3 days following the exam. This is normal. Gargling with warm salt water (1/2 tsp salt to 1 glass warm water) or using throat lozenges will help.  - If you experience any sharp pain in your neck, abdomen or chest, vomiting of blood, oral temperature over 100 degrees Fahrenheit, light-headedness or dizziness, or any other problems, contact your doctor.    **If unable to reach your doctor, please go to the [facility] Emergency Room**    - Your referring physician will receive a full report of your examination.  - If you do not hear from your doctor's office within two weeks of your biopsy, please call them for your results.    You may be able to see your laboratory results in [employer] between 4 and 7 business days.  In some cases, your physician may not have viewed the results before they are released to [employer].  If you have questions regarding your results contact the physician who ordered the test/exam by phone or via [employer] by choosing \"Ask a Medical Question.\"

## 2025-05-30 NOTE — H&P (VIEW-ONLY)
Is this a shared or split note between Advanced Practice Provider and Physician? Yes      [facility]   Gastroenterology Consultation Note    Benjamin Rachel Jr.  Patient Status:    Inpatient  Date of Admission:         5/29/2025, Hospital day #1  Attending:   Eder Goncalves MD  PCP:     Cole Yousif MD    Reason for Consultation:  Melena    History of Present Illness:  Benjamin Rachel Jr. is a 75 year old male w/ PMHx of BMI 30.40, BPH, Chronic venous insufficiency, HTN, HLD, PAD who presents to the ED with black, tarry diarrhea.    Pt notes that he was in his usual state of health when he was awoken from his sleep Wednesday night with diarrhea, black/tarry x17 episodes.  He was feeling generally weak and fatigued with subsequent falls so called 911 to come into the ED.  He denies ABD pain, N/V, dyspepsia, difficult/painful swallowing, bloody stools, constipation, fevers, chills, chest pain, SOB and unintentional weight loss.  He does not take NSAIDs outside of daily ASA.  No other AC.  He denies ABX use, sick contacts, recent travel but notes eating leftovers from the weekend for lunch Wednesday.  He has never had black/tarry stools previously.  No pepto or iron supplements.    Pertinent Family Hx:  + FHCC, maternal grandfather (dx 60s)?  - No family hx of esophageal, gastric cancer  - No family history of IBD    Endoscopy Hx:  - Colonoscopy  1/2025 with Dr. Jackson for screening:   Impression:  1.  Uncomplicated sigmoid colon diverticulosis  2.  Otherwise normal colonoscopy to the terminal ileum    - Colonoscopy 8/2021:   Impression:  1.  Diminutive/small colon polyps  2.  Sigmoid colon diverticulosis, currently uncomplicated    Social Hx:  - No tobacco use  - No ETOH  - Denies cannabis/illicit drug use  - PRN NSAIDs  - Lives with: sister  - Occupation: Retired      History:  Past Medical History[1]  Past Surgical History[2]  Family History[3]   reports that he has never smoked. He has  never used smokeless tobacco. He reports that he does not drink alcohol and does not use drugs.    Allergies:  Allergies[4]    Medications:  Current Hospital Medications[5]    Review of Systems:   CONSTITUTIONAL:  negative for fevers, chills, unintentional weight loss   EYES:  negative for diplopia or change in vision   RESPIRATORY:  negative for severe shortness of breath  CARDIOVASCULAR:  negative for crushing sub-sternal chest pain  GASTROINTESTINAL:  see HPI  GENITOURINARY:  negative for dysuria or gross hematuria  INTEGUMENT/BREAST:  SKIN:  negative for jaundice or new rash   ALLERGIC/IMMUNOLOGIC:  negative for hay fever   ENDOCRINE:  negative for cold intolerance and heat intolerance  MUSCULOSKELETAL:  negative for joint effusion/severe erythema  NEURO: negative for new loss of consciousness or dizziness   BEHAVIOR/PSYCH:  negative for psychotic behavior    Physical Exam:    Blood pressure (!) 127/100, pulse 83, temperature 97.5 °F (36.4 °C), temperature source Temporal, resp. rate 16, height 6' 1\" (1.854 m), weight 230 lb 6.1 oz (104.5 kg), SpO2 90%. Body mass index is 30.4 kg/m².    Gen: awake, alert patient, NAD  HEENT: EOMI, the sclera appears anicteric, oropharynx clear, mucus membranes appear moist  CV: RRR  Lung: no conversational dyspnea   Abdomen: soft NTND abdomen with NABS appreciated   Back: No CVA tenderness   Skin: dry, warm, no jaundice  Ext: no LE edema is evident  Neuro: Alert and interactive  Psych: calm, cooperative    Laboratory Data:  Lab Results   Component Value Date    WBC 14.8 05/29/2025    HGB 8.6 05/30/2025    HCT 23.3 05/29/2025    .0 05/29/2025    CREATSERUM 1.28 05/29/2025    BUN 57 05/29/2025     05/29/2025    K 4.7 05/29/2025     05/29/2025    CO2 20.0 05/29/2025     05/29/2025    CA 8.0 05/29/2025    ALB 3.2 05/29/2025    ALKPHO 43 05/29/2025    BILT 0.5 05/29/2025    TP 5.3 05/29/2025    AST 13 05/29/2025    ALT 13 05/29/2025    PTT 25.5 05/29/2025     INR 1.25 05/29/2025       Imaging:  No results found.    Assessment & Plan   Benjamin Rachel Jr. is a 75 year old male w/ PMHx of BMI 30.40, BPH, Chronic venous insufficiency, HTN, HLD, PAD who presents to the ED with black, tarry diarrhea.    #Melena  #Anemia  -Black tarry diarrheal stools x17 episodes with subsequent weakness Wednesday night into Thursday  -Hgb on admission was 7.5 from baseline 14-15, transfused 2 units with improvement to 8.8, stable at 8.6 this am.  BUN elevated to 57, BUN:Cr 44.5.  -No prior EGD  -Last colonoscopy 1/2025 with uncomplicated benign sigmoid diverticulosis  -Etiology possible AVM, dieulafoy, PUD, less likely neoplasm  -Recommend upper endoscopy to further evaluate for intraluminal source of bleeding.  Risks/benefits discussed. Pt states understanding and agrees to proceed.  -Pt remains HDS with last tarry stools 0300, which is reassuring.    EGD consent: I have discussed the risks, benefits, and alternatives to upper endoscopy/enteroscopy with the patient/primary decision maker [who demonstrated understanding], including but not limited to the risks of bleeding, infection, pain, death, as well as the risks of anesthesia and perforation all leading to prolonged hospitalization, surgical intervention, or even death. I also specifically mentioned the miss rate of upper endoscopy of 5-10% in the best of all circumstances.  The patient has agreed to sign an informed consent and elected to proceed with procedure with possible intervention [i.e. polypectomy, stent placement, etc.] as indicated.     Recommend:  -EGD today  -NPO  -Empiric PPI BID  -Monitor for overt bleeding  -Trend Hgb  -Stool studies if diarrhea without melena persists    Thank you for the opportunity to participate in the care of this patient.    Case discussed with Clarisa Vuong MD and Radha BLUM.    Jyoti Chavez DNP, FNP-[facility] Gastroenterology  5/30/2025          [1]   Past Medical History:   BPH (benign  prostatic hyperplasia)    Chronic venous insufficiency of lower extremity    Contact dermatitis    to legs; since 1988    Essential hypertension    High blood pressure    High cholesterol    History of right inguinal hernia repair    age 7     Nasal polyps    ENT Dr Quintana    PAD (peripheral artery disease)    S/P LASIK surgery of both eyes    Toe deformity    since 1988   [2]   Past Surgical History:  Procedure Laterality Date    Colonoscopy N/A 8/10/2021    Procedure: COLONOSCOPY;  Surgeon: Fareed Jackson MD;  Location: [facility] ENDOSCOPY    Colonoscopy N/A 1/9/2025    Procedure: COLONOSCOPY;  Surgeon: Fareed Jackson MD;  Location: [facility] ENDOSCOPY    Excision turbinate,submucous  12/27/2019    Eye surgery      Hernia surgery      Nasal scope,bx/rmv polyp/debrid  12/27/2019    Nasal scopy,explor frontal sinus  12/27/2019    Nasal scopy,remv part ethmoid  12/27/2019    Nasal scopy,remv tiss sphenoid  12/27/2019    Nasal scopy,rmv tiss maxill sinus  12/27/2019   [3]   Family History  Problem Relation Age of Onset    No Known Problems Father     No Known Problems Mother     Diabetes Sister    [4]   Allergies  Allergen Reactions    Soap RASH and OTHER (SEE COMMENTS)     Tide no dye     Skin reaction, also itching   [5]   Current Facility-Administered Medications:     atorvastatin (Lipitor) tab 40 mg, 40 mg, Oral, Daily    pantoprazole (Protonix) 40 mg in sodium chloride 0.9% PF 10 mL IV push, 40 mg, Intravenous, Q12H    sodium chloride 0.9% infusion, , Intravenous, Continuous    acetaminophen (Tylenol Extra Strength) tab 500 mg, 500 mg, Oral, Q4H PRN    acetaminophen (Tylenol) tab 650 mg, 650 mg, Oral, Q4H PRN **OR** HYDROcodone-acetaminophen (Norco) 5-325 MG per tab 1 tablet, 1 tablet, Oral, Q4H PRN **OR** HYDROcodone-acetaminophen (Norco) 5-325 MG per tab 2 tablet, 2 tablet, Oral, Q4H PRN    ondansetron (Zofran) 4 MG/2ML injection 4 mg, 4 mg, Intravenous, Q6H PRN    metoclopramide (Reglan) 5 mg/mL injection 5  mg, 5 mg, Intravenous, Q8H PRN

## 2025-05-30 NOTE — OPERATIVE REPORT
ESOPHAGOGASTRODUODENOSCOPY (EGD) REPORT    Benjamin Rachel Jr.     1950 Age 75 year old   PCP Cole Yousif MD Endoscopist Clarisa Vuong MD     Date of procedure: 25    Procedure: EGD w/biopsy    Pre-operative diagnosis: melena, ABLA    Post-operative diagnosis: see impression    Medications: MAC    Complications: none    Procedure:  Informed consent was obtained from the patient after the risks of the procedure were discussed, including but not limited to bleeding, perforation, aspiration, infection, or possibility of a missed lesion. After discussions of the risks/benefits and alternatives to this procedure, as well as the planned sedation, the patient was placed in the left lateral decubitus position and begun on continuous blood pressure pulse oximetry and EKG monitoring and this was maintained throughout the procedure. Once an adequate level of sedation was obtained a bite block was placed. Then the lubricated tip of the [id number] diagnostic video upper endoscope was inserted and advanced using direct visualization into the posterior pharynx and ultimately into the esophagus, stomach, and duodenum.    Complications: None    Findings:      1. Esophagus: normal    2. Stomach: We then entered the stomach. Gastric mucosa appeared normal in the forward view with no evidence of erythema, erosions, or ulcerations. There was no evidence of any luminal or submucosal masses. A retroflexed view allowed examination of the angularis, cardia and fundus and these areas also appeared normal with a non-patulous cardia. No hiatal hernia seen. Clear secretions. Random biopsies of the stomach (body, antrum, cardia, and incisura) were taken with cold forceps for histology.     3. Duodenum: in the bulb there were few small non-bleeding duodenal erosions along with a 8 mm clean based ulcer with pigment spot. Bilious secretions.     We then withdrew the instrument from the patient who tolerated the procedure well.      Impression:   1. Small lopez class II c duodenal bulb ulcer along with small erosions    Recommend:  1. Follow-up pathology to exclude h pylori  2. Continue ppi bid  3. Ok to resume diet    >>>If tissue was sampled/removed and you have not received your pathology results either by phone or letter within 2 weeks, please call our office at [phone].    Specimens:  Gastric     Blood loss: <1 ml      Clarisa Vuong MD  [facility] Gastroenterology

## 2025-05-30 NOTE — ANESTHESIA POSTPROCEDURE EVALUATION
Patient: Benjamin Rachel Jr.    Procedure Summary       Date: 05/30/25 Room / Location: [facility] ENDOSCOPY 05 / EM ENDOSCOPY    Anesthesia Start: 1435 Anesthesia Stop:     Procedure: ESOPHAGOGASTRODUODENOSCOPY (EGD) Diagnosis: (duodenal ulcer)    Surgeons: Clarisa Vuong MD Anesthesiologist: Brandon Solis CRNA    Anesthesia Type: MAC ASA Status: 3            Anesthesia Type: MAC    Vitals Value Taken Time   BP 97/56 05/30/25 14:54   Temp 97.8 05/30/25 14:54   Pulse 78 05/30/25 14:54   Resp 12 05/30/25 14:54   SpO2 100 05/30/25 14:54       [facility] AN Post Evaluation:   Patient Evaluated in PACU  Patient Participation: complete - patient participated  Level of Consciousness: awake  Pain Score: 0  Pain Management: adequate  Airway Patency:patent  Yes    Nausea/Vomiting: none  Cardiovascular Status: acceptable  Respiratory Status: acceptable  Postoperative Hydration acceptable      Brandon Solis CRNA  5/30/2025 2:54 PM

## 2025-05-30 NOTE — CONSULTS
Is this a shared or split note between Advanced Practice Provider and Physician? Yes      [facility]   Gastroenterology Consultation Note    Benjamin Rachel Jr.  Patient Status:    Inpatient  Date of Admission:         5/29/2025, Hospital day #1  Attending:   Eder Goncalves MD  PCP:     Cole Yousif MD    Reason for Consultation:  Melena    History of Present Illness:  Benjamin Rachel Jr. is a 75 year old male w/ PMHx of BMI 30.40, BPH, Chronic venous insufficiency, HTN, HLD, PAD who presents to the ED with black, tarry diarrhea.    Pt notes that he was in his usual state of health when he was awoken from his sleep Wednesday night with diarrhea, black/tarry x17 episodes.  He was feeling generally weak and fatigued with subsequent falls so called 911 to come into the ED.  He denies ABD pain, N/V, dyspepsia, difficult/painful swallowing, bloody stools, constipation, fevers, chills, chest pain, SOB and unintentional weight loss.  He does not take NSAIDs outside of daily ASA.  No other AC.  He denies ABX use, sick contacts, recent travel but notes eating leftovers from the weekend for lunch Wednesday.  He has never had black/tarry stools previously.  No pepto or iron supplements.    Pertinent Family Hx:  + FHCC, maternal grandfather (dx 60s)?  - No family hx of esophageal, gastric cancer  - No family history of IBD    Endoscopy Hx:  - Colonoscopy  1/2025 with Dr. Jackson for screening:   Impression:  1.  Uncomplicated sigmoid colon diverticulosis  2.  Otherwise normal colonoscopy to the terminal ileum    - Colonoscopy 8/2021:   Impression:  1.  Diminutive/small colon polyps  2.  Sigmoid colon diverticulosis, currently uncomplicated    Social Hx:  - No tobacco use  - No ETOH  - Denies cannabis/illicit drug use  - PRN NSAIDs  - Lives with: sister  - Occupation: Retired      History:  Past Medical History[1]  Past Surgical History[2]  Family History[3]   reports that he has never smoked. He has  never used smokeless tobacco. He reports that he does not drink alcohol and does not use drugs.    Allergies:  Allergies[4]    Medications:  Current Hospital Medications[5]    Review of Systems:   CONSTITUTIONAL:  negative for fevers, chills, unintentional weight loss   EYES:  negative for diplopia or change in vision   RESPIRATORY:  negative for severe shortness of breath  CARDIOVASCULAR:  negative for crushing sub-sternal chest pain  GASTROINTESTINAL:  see HPI  GENITOURINARY:  negative for dysuria or gross hematuria  INTEGUMENT/BREAST:  SKIN:  negative for jaundice or new rash   ALLERGIC/IMMUNOLOGIC:  negative for hay fever   ENDOCRINE:  negative for cold intolerance and heat intolerance  MUSCULOSKELETAL:  negative for joint effusion/severe erythema  NEURO: negative for new loss of consciousness or dizziness   BEHAVIOR/PSYCH:  negative for psychotic behavior    Physical Exam:    Blood pressure (!) 127/100, pulse 83, temperature 97.5 °F (36.4 °C), temperature source Temporal, resp. rate 16, height 6' 1\" (1.854 m), weight 230 lb 6.1 oz (104.5 kg), SpO2 90%. Body mass index is 30.4 kg/m².    Gen: awake, alert patient, NAD  HEENT: EOMI, the sclera appears anicteric, oropharynx clear, mucus membranes appear moist  CV: RRR  Lung: no conversational dyspnea   Abdomen: soft NTND abdomen with NABS appreciated   Back: No CVA tenderness   Skin: dry, warm, no jaundice  Ext: no LE edema is evident  Neuro: Alert and interactive  Psych: calm, cooperative    Laboratory Data:  Lab Results   Component Value Date    WBC 14.8 05/29/2025    HGB 8.6 05/30/2025    HCT 23.3 05/29/2025    .0 05/29/2025    CREATSERUM 1.28 05/29/2025    BUN 57 05/29/2025     05/29/2025    K 4.7 05/29/2025     05/29/2025    CO2 20.0 05/29/2025     05/29/2025    CA 8.0 05/29/2025    ALB 3.2 05/29/2025    ALKPHO 43 05/29/2025    BILT 0.5 05/29/2025    TP 5.3 05/29/2025    AST 13 05/29/2025    ALT 13 05/29/2025    PTT 25.5 05/29/2025     INR 1.25 05/29/2025       Imaging:  No results found.    Assessment & Plan   Benjamin Rachel Jr. is a 75 year old male w/ PMHx of BMI 30.40, BPH, Chronic venous insufficiency, HTN, HLD, PAD who presents to the ED with black, tarry diarrhea.    #Melena  #Anemia  -Black tarry diarrheal stools x17 episodes with subsequent weakness Wednesday night into Thursday  -Hgb on admission was 7.5 from baseline 14-15, transfused 2 units with improvement to 8.8, stable at 8.6 this am.  BUN elevated to 57, BUN:Cr 44.5.  -No prior EGD  -Last colonoscopy 1/2025 with uncomplicated benign sigmoid diverticulosis  -Etiology possible AVM, dieulafoy, PUD, less likely neoplasm  -Recommend upper endoscopy to further evaluate for intraluminal source of bleeding.  Risks/benefits discussed. Pt states understanding and agrees to proceed.  -Pt remains HDS with last tarry stools 0300, which is reassuring.    EGD consent: I have discussed the risks, benefits, and alternatives to upper endoscopy/enteroscopy with the patient/primary decision maker [who demonstrated understanding], including but not limited to the risks of bleeding, infection, pain, death, as well as the risks of anesthesia and perforation all leading to prolonged hospitalization, surgical intervention, or even death. I also specifically mentioned the miss rate of upper endoscopy of 5-10% in the best of all circumstances.  The patient has agreed to sign an informed consent and elected to proceed with procedure with possible intervention [i.e. polypectomy, stent placement, etc.] as indicated.     Recommend:  -EGD today  -NPO  -Empiric PPI BID  -Monitor for overt bleeding  -Trend Hgb  -Stool studies if diarrhea without melena persists    Thank you for the opportunity to participate in the care of this patient.    Case discussed with Clarisa Vuong MD and Radha BLUM.    Jyoti Chavez DNP, FNP-[facility]astro[facility]  5/30/2025          [1]   Past Medical History:   BPH (benign  prostatic hyperplasia)    Chronic venous insufficiency of lower extremity    Contact dermatitis    to legs; since 1988    Essential hypertension    High blood pressure    High cholesterol    History of right inguinal hernia repair    age 7     Nasal polyps    ENT Dr Quintana    PAD (peripheral artery disease)    S/P LASIK surgery of both eyes    Toe deformity    since 1988   [2]   Past Surgical History:  Procedure Laterality Date    Colonoscopy N/A 8/10/2021    Procedure: COLONOSCOPY;  Surgeon: Fareed Jackson MD;  Location: [facility] ENDOSCOPY    Colonoscopy N/A 1/9/2025    Procedure: COLONOSCOPY;  Surgeon: Fareed Jackson MD;  Location: [facility] ENDOSCOPY    Excision turbinate,submucous  12/27/2019    Eye surgery      Hernia surgery      Nasal scope,bx/rmv polyp/debrid  12/27/2019    Nasal scopy,explor frontal sinus  12/27/2019    Nasal scopy,remv part ethmoid  12/27/2019    Nasal scopy,remv tiss sphenoid  12/27/2019    Nasal scopy,rmv tiss maxill sinus  12/27/2019   [3]   Family History  Problem Relation Age of Onset    No Known Problems Father     No Known Problems Mother     Diabetes Sister    [4]   Allergies  Allergen Reactions    Soap RASH and OTHER (SEE COMMENTS)     Tide no dye     Skin reaction, also itching   [5]   Current Facility-Administered Medications:     atorvastatin (Lipitor) tab 40 mg, 40 mg, Oral, Daily    pantoprazole (Protonix) 40 mg in sodium chloride 0.9% PF 10 mL IV push, 40 mg, Intravenous, Q12H    sodium chloride 0.9% infusion, , Intravenous, Continuous    acetaminophen (Tylenol Extra Strength) tab 500 mg, 500 mg, Oral, Q4H PRN    acetaminophen (Tylenol) tab 650 mg, 650 mg, Oral, Q4H PRN **OR** HYDROcodone-acetaminophen (Norco) 5-325 MG per tab 1 tablet, 1 tablet, Oral, Q4H PRN **OR** HYDROcodone-acetaminophen (Norco) 5-325 MG per tab 2 tablet, 2 tablet, Oral, Q4H PRN    ondansetron (Zofran) 4 MG/2ML injection 4 mg, 4 mg, Intravenous, Q6H PRN    metoclopramide (Reglan) 5 mg/mL injection 5  mg, 5 mg, Intravenous, Q8H PRN

## 2025-05-30 NOTE — INTERVAL H&P NOTE
Pre-op Diagnosis: melena    The above referenced H&P was reviewed by Clarisa Garduno Ma, MD on 5/30/2025, the patient was examined and no significant changes have occurred in the patient's condition since the H&P was performed.  I discussed with the patient and/or legal representative the potential benefits, risks and side effects of this procedure; the likelihood of the patient achieving goals; and potential problems that might occur during recuperation.  I discussed reasonable alternatives to the procedure, including risks, benefits and side effects related to the alternatives and risks related to not receiving this procedure.  We will proceed with procedure as planned.

## 2025-05-31 LAB
ALBUMIN SERPL-MCNC: 3.1 G/DL (ref 3.2–4.8)
ALBUMIN/GLOB SERPL: 1.6 {RATIO} (ref 1–2)
ALP LIVER SERPL-CCNC: 41 U/L (ref 45–117)
ALT SERPL-CCNC: 15 U/L (ref 10–49)
ANION GAP SERPL CALC-SCNC: 8 MMOL/L (ref 0–18)
AST SERPL-CCNC: 29 U/L (ref ?–34)
BASOPHILS # BLD AUTO: 0.08 X10(3) UL (ref 0–0.2)
BASOPHILS NFR BLD AUTO: 0.6 %
BILIRUB SERPL-MCNC: 0.6 MG/DL (ref 0.2–1.1)
BLOOD TYPE BARCODE: 5100
BUN BLD-MCNC: 20 MG/DL (ref 9–23)
BUN/CREAT SERPL: 21.3 (ref 10–20)
CALCIUM BLD-MCNC: 7.9 MG/DL (ref 8.7–10.4)
CHLORIDE SERPL-SCNC: 110 MMOL/L (ref 98–112)
CO2 SERPL-SCNC: 23 MMOL/L (ref 21–32)
CREAT BLD-MCNC: 0.94 MG/DL (ref 0.7–1.3)
DEPRECATED RDW RBC AUTO: 46.3 FL (ref 35.1–46.3)
EGFRCR SERPLBLD CKD-EPI 2021: 86 ML/MIN/1.73M2 (ref 60–?)
EOSINOPHIL # BLD AUTO: 0.7 X10(3) UL (ref 0–0.7)
EOSINOPHIL NFR BLD AUTO: 5.5 %
ERYTHROCYTE [DISTWIDTH] IN BLOOD BY AUTOMATED COUNT: 14.5 % (ref 11–15)
GLOBULIN PLAS-MCNC: 1.9 G/DL (ref 2–3.5)
GLUCOSE BLD-MCNC: 125 MG/DL (ref 70–99)
HCT VFR BLD AUTO: 21.9 % (ref 39–53)
HGB BLD-MCNC: 7.1 G/DL (ref 13–17.5)
IMM GRANULOCYTES # BLD AUTO: 0.11 X10(3) UL (ref 0–1)
IMM GRANULOCYTES NFR BLD: 0.9 %
LYMPHOCYTES # BLD AUTO: 2.36 X10(3) UL (ref 1–4)
LYMPHOCYTES NFR BLD AUTO: 18.5 %
MAGNESIUM SERPL-MCNC: 1.9 MG/DL (ref 1.6–2.6)
MCH RBC QN AUTO: 28.9 PG (ref 26–34)
MCHC RBC AUTO-ENTMCNC: 32.4 G/DL (ref 31–37)
MCV RBC AUTO: 89 FL (ref 80–100)
MONOCYTES # BLD AUTO: 0.91 X10(3) UL (ref 0.1–1)
MONOCYTES NFR BLD AUTO: 7.1 %
NEUTROPHILS # BLD AUTO: 8.63 X10 (3) UL (ref 1.5–7.7)
NEUTROPHILS # BLD AUTO: 8.63 X10(3) UL (ref 1.5–7.7)
NEUTROPHILS NFR BLD AUTO: 67.4 %
OSMOLALITY SERPL CALC.SUM OF ELEC: 296 MOSM/KG (ref 275–295)
PHOSPHATE SERPL-MCNC: 2.7 MG/DL (ref 2.4–5.1)
PLATELET # BLD AUTO: 164 10(3)UL (ref 150–450)
POTASSIUM SERPL-SCNC: 3.7 MMOL/L (ref 3.5–5.1)
PROT SERPL-MCNC: 5 G/DL (ref 5.7–8.2)
RBC # BLD AUTO: 2.46 X10(6)UL (ref 3.8–5.8)
SODIUM SERPL-SCNC: 141 MMOL/L (ref 136–145)
UNIT VOLUME: 350 ML
WBC # BLD AUTO: 12.8 X10(3) UL (ref 4–11)

## 2025-05-31 PROCEDURE — 99233 SBSQ HOSP IP/OBS HIGH 50: CPT | Performed by: HOSPITALIST

## 2025-05-31 PROCEDURE — 99233 SBSQ HOSP IP/OBS HIGH 50: CPT | Performed by: INTERNAL MEDICINE

## 2025-05-31 NOTE — PROGRESS NOTES
[facility]  part of [facility]     Hospitalist Progress Note     Benjamin Rachel Jr. Patient Status:  Inpatient    1950 MRN [id number]   Location [facility] 2W/SW Attending Eder Goncalves MD   Hosp Day # 2 PCP Cole Yousif MD     Subjective:     Patient is feeling ok.  Complaining of dryness in his legs and that his feet hurt especially in his heels.  Hb dropped to 7.1 today  Denies any signs of bleeding.       Objective:    Review of Systems:   ROS completed; pertinent positive and negatives stated in subjective.      Vital signs:  Temp:  [97.8 °F (36.6 °C)-98.7 °F (37.1 °C)] 98.7 °F (37.1 °C)  Pulse:  [77-92] 77  Resp:  [5-25] 18  BP: ()/(34-83) 131/59  SpO2:  [95 %-100 %] 95 %      Physical Exam:    Gen: NAD AO x3  Chest: good air entry CTABL  CVS: normal s1 and s2 RR  Abd: NABS soft NT ND  Neuro: CN 2-12 grossly intact  Ext: no edema in bilateral LE      Diagnostic Data:    Labs:  Recent Labs   Lab 25  1713 25  0318 25  0842 25  1642 25  0700   WBC 14.8*  --   --   --  12.8*   HGB 7.5* 8.8* 8.6* 9.2* 7.1*   MCV 89.3  --   --   --  89.0   .0  --   --   --  164.0   INR 1.25*  --   --   --   --        Recent Labs   Lab 25  1713 25  0700   * 125*   BUN 57* 20   CREATSERUM 1.28 0.94   CA 8.0* 7.9*   ALB 3.2 3.1*    141   K 4.7 3.7    110   CO2 20.0* 23.0   ALKPHO 43* 41*   AST 13 29   ALT 13 15   BILT 0.5 0.6   TP 5.3* 5.0*       Estimated Creatinine Clearance: 79.1 mL/min (based on SCr of 0.94 mg/dL).    Recent Labs   Lab 25  1713   PTP 16.4*   INR 1.25*              Imaging: Imaging data reviewed in Epic.    Medications: Scheduled Medications[1]    Assessment & Plan:     GI bleed  Acute blood loss anemia  Diarrhea  Multiple episodes of diarrhea with black/dark stools at home  Hb 7.5 on admission, baseline ~15 per chart review  Started on IVF, IV Protonix BID  Transfused 1U pRBC  Monitor H&H,  transfuse as indicated  GI on consult  Appreciate recs  Supportive care  Hb dropped to 7.1 today- transfuse if Hb <7     HTN  BP well controlled  Hold home meds at this time  Monitor vitals and titrate meds as indicated  HLD  Continue statin  Hx of colon polyps and sigmoid diverticulosis  Diagnosed on colonoscopy several years ago  Repeat CLN done in Jan 2025  Regular surveillance in 5 years  Opt GI follow up  Severe Venous stasis  - apply eucerin cream  - wound care consult on Monday       Plan of care discussed with patient or family at bedside.      Supplementary Documentation:     Quality:  DVT Prophylaxis: SCDs  CODE status: Full       Estimated date of discharge: TBD  Discharge is dependent on: clinical stability  At this point Mr. Rachel is expected to be discharge to: home       MDM. High Mery Monsalve MD         [1]    atorvastatin  40 mg Oral Daily    pantoprazole  40 mg Intravenous Q12H

## 2025-05-31 NOTE — PROGRESS NOTES
[facility]     Gastroenterology Progress Note    Benjamin Rachel Jr. Patient Status:  Inpatient    1950 MRN [id number]   Location [facility] 5SW/SE Attending Mery Monsalve MD   Hosp Day # 2 PCP Cole Yousif MD       Subjective:   No bowel movement overnight.  He feels well this morning.  No abdominal pain, nausea, emesis.  Tolerating diet.    Objective:   Blood pressure 117/83, pulse 77, temperature 97.8 °F (36.6 °C), temperature source Oral, resp. rate 17, height 6' 1\" (1.854 m), weight 230 lb 6.1 oz (104.5 kg), SpO2 95%. Body mass index is 30.4 kg/m².    General: awake, alert and oriented, no acute distress  HEENT: moist mucus membranes  PULM: no conversational dyspnea  CARDIOVASCULAR: regular rate and rhythm, the extremities are warm and well perfused  GI: soft, non-tender, non-distended, + BS, no rebound/guarding   EXTREMITIES: no edema, moving all extremities  SKIN: no visible rash  NEURO: appropriate and interactive    Assessment and Plan:   75M on PAD on aspirin who presents with melena and ABLA. Hgb 7.5 s/p 2 unit prbc transfusion on admission.  Infectious stool studies including C. difficile and GI stool PCR negative.  EGD  showed a small Blas class IIc duodenal bulb ulcer along with small erosions.  Biopsies obtained to exclude H. pylori.    No further overt GI bleeding overnight  Hgb 7.1 this morning may be equilibration    Ok to resume diet  Empiric PPI  Repeat hgb in morning  Follow-up pathology      Clarisa Vuong MD  [facility] Gastroenterology      Results:     Lab Results   Component Value Date    WBC 12.8 (H) 2025    HGB 7.1 (L) 2025    HCT 21.9 (L) 2025    .0 2025    CREATSERUM 0.94 2025    BUN 20 2025     2025    K 3.7 2025     2025    CO2 23.0 2025     (H) 2025    CA 7.9 (L) 2025    ALB 3.1 (L) 2025    ALKPHO 41 (L) 2025    BILT 0.6  05/31/2025    TP 5.0 (L) 05/31/2025    AST 29 05/31/2025    ALT 15 05/31/2025    PTT 25.5 05/29/2025    INR 1.25 (H) 05/29/2025    TSH 1.929 11/09/2024    PSA 4.95 (H) 11/09/2024    MG 1.9 05/31/2025    PHOS 2.7 05/31/2025       No results found.  EKG 12 Lead  Result Date: 5/30/2025  Normal sinus rhythm Left axis deviation Minimal voltage criteria for LVH, may be normal variant ( R in aVL ) Abnormal ECG When compared with ECG of 12-NOV-2022 12:21, Premature ventricular complexes are no longer Present Confirmed by SALVATORE DALY, JEEVAN ([id number]) on 5/30/2025 8:24:19 AM

## 2025-05-31 NOTE — PHYSICAL THERAPY NOTE
PHYSICAL THERAPY EVALUATION - INPATIENT     Room Number: 564/564-A  Evaluation Date: 5/31/2025  Type of Evaluation: Initial   Physician Order: PT Eval and Treat    Presenting Problem: GI bleed, anemia  Co-Morbidities : venous insuff BLE  Reason for Therapy: Mobility Dysfunction and Discharge Planning    PHYSICAL THERAPY ASSESSMENT   Patient is a 75 year old male admitted 5/29/2025 for anemia, GI bleed.  Prior to admission, patient's baseline is MI.  Patient is currently functioning below baseline with bed mobility, transfers, and gait.  Patient is requiring contact guard assist and moderate assist as a result of the following impairments: decreased functional strength, pain, impaired motor planning, and medical status.  Physical Therapy will continue to follow for duration of hospitalization.    Patient will benefit from continued skilled PT Services at discharge to promote prior level of function.  Anticipate patient will return home with home health PT.    PLAN DURING HOSPITALIZATION  Nursing Mobility Recommendation : Lift Equipment  PT Device Recommendation: Rolling walker  PT Treatment Plan: Bed mobility, Gait training, Patient education  Rehab Potential : Fair  Frequency (Obs): 3x/week     PHYSICAL THERAPY MEDICAL/SOCIAL HISTORY   History related to current admission: ER with black diarrhea, weakness, falls.  C/o severe BLE foot sensitivity due to venous insuff.       Problem List  Principal Problem:    Gastrointestinal hemorrhage, unspecified gastrointestinal hemorrhage type  Active Problems:    Symptomatic anemia    Hemorrhagic shock (HCC)      HOME SITUATION  Type of Home: House  Home Layout: One level  Stairs to Enter : 2                  Lives With: Family    Drives: No   Patient Regularly Uses: None     Prior Level of [place]: lives with sister in 2 step ranch.  Only wears flip flops outside and barefoot indoors.    SUBJECTIVE  Don't touch my feet    PHYSICAL THERAPY EXAMINATION    OBJECTIVE  Precautions: Limb alert - left, Limb alert - right  Fall Risk: High fall risk    WEIGHT BEARING RESTRICTION       PAIN ASSESSMENT  Ratin  Location: feet  Management Techniques: Activity promotion    COGNITION  Perseveration: perseverates during conversation  concerned about feet    RANGE OF MOTION AND STRENGTH ASSESSMENT  Upper extremity ROM and strength are within functional limits see OT  Lower extremity ROM is within functional limits except for the following: BLE  Lower extremity strength is within functional limits except for the following:  BLE    BALANCE  Static Sitting: Fair +  Dynamic Sitting: Fair +  Static Standing: Fair -  Dynamic Standing: Poor +    ADDITIONAL TESTS                                    NEUROLOGICAL FINDINGS                      ACTIVITY TOLERANCE                         O2 WALK       AM-PAC '6-Clicks' INPATIENT SHORT FORM - BASIC MOBILITY  How much difficulty does the patient currently have...  Patient Difficulty: Turning over in bed (including adjusting bedclothes, sheets and blankets)?: A Little   Patient Difficulty: Sitting down on and standing up from a chair with arms (e.g., wheelchair, bedside commode, etc.): A Little   Patient Difficulty: Moving from lying on back to sitting on the side of the bed?: A Little   How much help from another person does the patient currently need...   Help from Another: Moving to and from a bed to a chair (including a wheelchair)?: A Little   Help from Another: Need to walk in hospital room?: A Lot   Help from Another: Climbing 3-5 steps with a railing?: A Lot     AM-PAC Score:  Raw Score: 16   Approx Degree of Impairment: 54.16%   Standardized Score (AM-PAC Scale): 40.78   CMS Modifier (G-Code): CK    FUNCTIONAL ABILITY STATUS  Functional Mobility/Gait Assessment  Gait Assistance: Not tested  Rolling: minimal assist  Supine to Sit: minimal assist  Sit to Supine: minimal assist  Sit to Stand: contact guard assist    Exercise/Education  Provided:  Bed mobility  Body mechanics  Energy conservation  Functional activity tolerated  ROM  Transfer training    Skilled Therapy Provided: Chart reviewed. RN aware.  Sister present.  Pt up in bed.  C/o weakness and BLE foot pain due to chronic venous insuff.  States he is only able to wear flip flops.  Min A with bed mob.  Sit to stand with CGA.  Pt donned flip flops and noticed signif bleeding from R foot.  Sat down, contacted RN.  Noted skin tears to 4 toes on both feet.  Assisted with gauze and wrapping R foot to stop bleeding.  Returned to supine.  Discussed potential for new footwear due to poor skin condition.  Pt adamant that he will go barefoot and prefers to be returned to home via ambulance.  All needs left wtihin reach. RN aware.     The patient's Approx Degree of Impairment: 54.16% has been calculated based on documentation in the [facility] '6 clicks' Inpatient Basic Mobility Short Form.  Research supports that patients with this level of impairment may benefit from  PT .  Final disposition will be made by interdisciplinary medical team.    Patient End of Session: In bed, Needs met, With  staff, Call light within reach, RN aware of session/findings, All patient questions and concerns addressed, Alarm set, Family present    CURRENT GOALS  Goals to be met by: 6/7/2025  Patient Goal Patient's self-stated goal is: to go home   Goal #1 Patient is able to demonstrate supine - sit EOB @ level: independent     Goal #1   Current Status    Goal #2 Patient is able to demonstrate transfers EOB to/from C at assistance level: independent with none     Goal #2  Current Status    Goal #3 Patient is able to ambulate 400 feet with assist device: walker - rolling at assistance level: modified independent   Goal #3   Current Status    Goal #4 Patient will negotiate 2 stairs/one curb w/ assistive device and supervision   Goal #4   Current Status    Goal #5 Patient to demonstrate independence with home  activity/exercise instructions provided to patient in preparation for discharge.   Goal #5   Current Status    Goal #6    Goal #6  Current Status      Patient Evaluation Complexity Level:  History Moderate - 1 or 2 personal factors and/or co-morbidities   Examination of body systems Moderate - addressing a total of 3 or more elements   Clinical Presentation  Moderate - Evolving   Clinical Decision Making  Moderate Complexity     Gait Trainin minutes  Therapeutic Activity:  25 minutes  Neuromuscular Re-education: 5 minutes  Therapeutic Exercise: 0 minutes  Canalith Repositionin minutes  Manual Therapy: 0 minutes  Can add/delete any of these

## 2025-05-31 NOTE — PLAN OF CARE
Problem: Patient Centered Care  Goal: Patient preferences are identified and integrated in the patient's plan of care  Description: Interventions:  - Provide timely, complete, and accurate information to patient/family  - Incorporate patient and family knowledge, values, beliefs, and cultural backgrounds into the planning and delivery of care  - Encourage patient/family to participate in care and decision-making at the level they choose  - Honor patient and family perspectives and choices  Outcome: Progressing     Problem: PAIN - ADULT  Goal: Verbalizes/displays adequate comfort level or patient's stated pain goal  Description: INTERVENTIONS:  - Encourage pt to monitor pain and request assistance  - Assess pain using appropriate pain scale  - Administer analgesics based on type and severity of pain and evaluate response  - Implement non-pharmacological measures as appropriate and evaluate response  - Consider cultural and social influences on pain and pain management  - Manage/alleviate anxiety  - Utilize distraction and/or relaxation techniques  - Monitor for opioid side effects  - Notify MD/LIP if interventions unsuccessful or patient reports new pain  - Anticipate increased pain with activity and pre-medicate as appropriate  Outcome: Progressing     Problem: GASTROINTESTINAL - ADULT  Goal: Minimal or absence of nausea and vomiting  Description: INTERVENTIONS:  - Maintain adequate hydration with IV or PO as ordered and tolerated  - Nasogastric tube to low intermittent suction as ordered  - Evaluate effectiveness of ordered antiemetic medications  - Provide nonpharmacologic comfort measures as appropriate  - Advance diet as tolerated, if ordered  - Obtain nutritional consult as needed  - Evaluate fluid balance  Outcome: Progressing  Goal: Maintains or returns to baseline bowel function  Description: INTERVENTIONS:  - Assess bowel function  - Maintain adequate hydration with IV or PO as ordered and tolerated  -  Evaluate effectiveness of GI medications  - Encourage mobilization and activity  - Obtain nutritional consult as needed  - Establish a toileting routine/schedule  - Consider collaborating with pharmacy to review patient's medication profile  Outcome: Progressing

## 2025-05-31 NOTE — TRANSFER CENTER NOTE
Double RN skin check done prior to transfer off Unit. Skin check performed by this RN and TIFFANI Gifford.      Wounds are as follows: n/a     Will remain available for any further questions or concerns.

## 2025-06-01 LAB
ANION GAP SERPL CALC-SCNC: 7 MMOL/L (ref 0–18)
BUN BLD-MCNC: 17 MG/DL (ref 9–23)
BUN/CREAT SERPL: 18.5 (ref 10–20)
CALCIUM BLD-MCNC: 8 MG/DL (ref 8.7–10.4)
CHLORIDE SERPL-SCNC: 108 MMOL/L (ref 98–112)
CO2 SERPL-SCNC: 26 MMOL/L (ref 21–32)
CREAT BLD-MCNC: 0.92 MG/DL (ref 0.7–1.3)
EGFRCR SERPLBLD CKD-EPI 2021: 88 ML/MIN/1.73M2 (ref 60–?)
GLUCOSE BLD-MCNC: 119 MG/DL (ref 70–99)
HGB BLD-MCNC: 6.9 G/DL (ref 13–17.5)
HGB BLD-MCNC: 7.7 G/DL (ref 13–17.5)
OSMOLALITY SERPL CALC.SUM OF ELEC: 295 MOSM/KG (ref 275–295)
POTASSIUM SERPL-SCNC: 3.7 MMOL/L (ref 3.5–5.1)
SODIUM SERPL-SCNC: 141 MMOL/L (ref 136–145)

## 2025-06-01 PROCEDURE — 99233 SBSQ HOSP IP/OBS HIGH 50: CPT | Performed by: HOSPITALIST

## 2025-06-01 PROCEDURE — 99233 SBSQ HOSP IP/OBS HIGH 50: CPT | Performed by: INTERNAL MEDICINE

## 2025-06-01 RX ORDER — SODIUM CHLORIDE 9 MG/ML
INJECTION, SOLUTION INTRAVENOUS ONCE
Status: COMPLETED | OUTPATIENT
Start: 2025-06-01 | End: 2025-06-01

## 2025-06-01 NOTE — PROGRESS NOTES
[facility]     Gastroenterology Progress Note    Benjamin Rachel Jr. Patient Status:  Inpatient    1950 MRN [id number]   Location [facility] 5SW/SE Attending Mery Monsalve MD   Hosp Day # 3 PCP Cole Yousif MD       Subjective:   In discussing with nursing, 1 small black formed stool.  He feels well this morning.  No abdominal pain, nausea, emesis.  Tolerating diet.    Objective:   Blood pressure 135/63, pulse 73, temperature 98.4 °F (36.9 °C), temperature source Oral, resp. rate 16, height 6' 1\" (1.854 m), weight 225 lb 3.2 oz (102.2 kg), SpO2 95%. Body mass index is 29.71 kg/m².    General: awake, alert and oriented, no acute distress  HEENT: moist mucus membranes  PULM: no conversational dyspnea  CARDIOVASCULAR: regular rate and rhythm, the extremities are warm and well perfused  GI: soft, non-tender, non-distended, + BS, no rebound/guarding   EXTREMITIES: no edema, moving all extremities  SKIN: no visible rash  NEURO: appropriate and interactive    Assessment and Plan:   75M on PAD on aspirin who presents with melena and ABLA. Hgb 7.5 s/p 2 unit prbc transfusion on admission.  Infectious stool studies including C. difficile and GI stool PCR negative.  EGD  showed a small Blas class IIc duodenal bulb ulcer along with small erosions.  Biopsies negative for H. pylori.    No further overt GI bleeding overnight  Hgb 7.0 this morning may be equilibration, stable    Ok to resume diet  Empiric PPI twice daily x 8 weeks then daily thereafter  Consider repeat hemoglobin this afternoon prior to discharge     Clarisa Vuong MD  [facility] Gastroenterology      Results:     Lab Results   Component Value Date    WBC 11.7 (H) 2025    HGB 7.0 (L) 2025    HCT 21.5 (L) 2025    .0 2025    CREATSERUM 0.92 2025    BUN 17 2025     2025    K 3.7 2025     2025    CO2 26.0 2025     (H) 2025    CA  8.0 (L) 06/01/2025    ALB 3.1 (L) 05/31/2025    ALKPHO 41 (L) 05/31/2025    BILT 0.6 05/31/2025    TP 5.0 (L) 05/31/2025    AST 29 05/31/2025    ALT 15 05/31/2025    PTT 25.5 05/29/2025    INR 1.25 (H) 05/29/2025    TSH 1.929 11/09/2024    PSA 4.95 (H) 11/09/2024    MG 1.9 05/31/2025    PHOS 2.7 05/31/2025       No results found.

## 2025-06-01 NOTE — PLAN OF CARE
Safety precautions in place. Call light within reach.  Problem: Patient Centered Care  Goal: Patient preferences are identified and integrated in the patient's plan of care  Description: Interventions:  - What would you like us to know as we care for you?   - Provide timely, complete, and accurate information to patient/family  - Incorporate patient and family knowledge, values, beliefs, and cultural backgrounds into the planning and delivery of care  - Encourage patient/family to participate in care and decision-making at the level they choose  - Honor patient and family perspectives and choices  Outcome: Progressing     Problem: Patient/Family Goals  Goal: Patient/Family Long Term Goal  Description: Patient's Long Term Goal:     Interventions:  -   - See additional Care Plan goals for specific interventions  Outcome: Progressing  Goal: Patient/Family Short Term Goal  Description: Patient's Short Term Goal:     Interventions:   -   - See additional Care Plan goals for specific interventions  Outcome: Progressing     Problem: PAIN - ADULT  Goal: Verbalizes/displays adequate comfort level or patient's stated pain goal  Description: INTERVENTIONS:  - Encourage pt to monitor pain and request assistance  - Assess pain using appropriate pain scale  - Administer analgesics based on type and severity of pain and evaluate response  - Implement non-pharmacological measures as appropriate and evaluate response  - Consider cultural and social influences on pain and pain management  - Manage/alleviate anxiety  - Utilize distraction and/or relaxation techniques  - Monitor for opioid side effects  - Notify MD/LIP if interventions unsuccessful or patient reports new pain  - Anticipate increased pain with activity and pre-medicate as appropriate  Outcome: Progressing     Problem: GASTROINTESTINAL - ADULT  Goal: Minimal or absence of nausea and vomiting  Description: INTERVENTIONS:  - Maintain adequate hydration with IV or PO as  ordered and tolerated  - Nasogastric tube to low intermittent suction as ordered  - Evaluate effectiveness of ordered antiemetic medications  - Provide nonpharmacologic comfort measures as appropriate  - Advance diet as tolerated, if ordered  - Obtain nutritional consult as needed  - Evaluate fluid balance  Outcome: Progressing  Goal: Maintains or returns to baseline bowel function  Description: INTERVENTIONS:  - Assess bowel function  - Maintain adequate hydration with IV or PO as ordered and tolerated  - Evaluate effectiveness of GI medications  - Encourage mobilization and activity  - Obtain nutritional consult as needed  - Establish a toileting routine/schedule  - Consider collaborating with pharmacy to review patient's medication profile  Outcome: Progressing     Problem: Patient/Family Goals  Goal: Patient/Family Long Term Goal  Description: Patient's Long Term Goal:     Interventions:  -   - See additional Care Plan goals for specific interventions  Outcome: Progressing     Problem: PAIN - ADULT  Goal: Verbalizes/displays adequate comfort level or patient's stated pain goal  Description: INTERVENTIONS:  - Encourage pt to monitor pain and request assistance  - Assess pain using appropriate pain scale  - Administer analgesics based on type and severity of pain and evaluate response  - Implement non-pharmacological measures as appropriate and evaluate response  - Consider cultural and social influences on pain and pain management  - Manage/alleviate anxiety  - Utilize distraction and/or relaxation techniques  - Monitor for opioid side effects  - Notify MD/LIP if interventions unsuccessful or patient reports new pain  - Anticipate increased pain with activity and pre-medicate as appropriate  Outcome: Progressing     Problem: GASTROINTESTINAL - ADULT  Goal: Minimal or absence of nausea and vomiting  Description: INTERVENTIONS:  - Maintain adequate hydration with IV or PO as ordered and tolerated  - Nasogastric  tube to low intermittent suction as ordered  - Evaluate effectiveness of ordered antiemetic medications  - Provide nonpharmacologic comfort measures as appropriate  - Advance diet as tolerated, if ordered  - Obtain nutritional consult as needed  - Evaluate fluid balance  Outcome: Progressing

## 2025-06-01 NOTE — PLAN OF CARE
Problem: Patient Centered Care  Goal: Patient preferences are identified and integrated in the patient's plan of care  Description: Interventions:  - What would you like us to know as we care for you?   - Provide timely, complete, and accurate information to patient/family  - Incorporate patient and family knowledge, values, beliefs, and cultural backgrounds into the planning and delivery of care  - Encourage patient/family to participate in care and decision-making at the level they choose  - Honor patient and family perspectives and choices  Outcome: Progressing     Problem: PAIN - ADULT  Goal: Verbalizes/displays adequate comfort level or patient's stated pain goal  Description: INTERVENTIONS:  - Encourage pt to monitor pain and request assistance  - Assess pain using appropriate pain scale  - Administer analgesics based on type and severity of pain and evaluate response  - Implement non-pharmacological measures as appropriate and evaluate response  - Consider cultural and social influences on pain and pain management  - Manage/alleviate anxiety  - Utilize distraction and/or relaxation techniques  - Monitor for opioid side effects  - Notify MD/LIP if interventions unsuccessful or patient reports new pain  - Anticipate increased pain with activity and pre-medicate as appropriate  Outcome: Progressing     Problem: GASTROINTESTINAL - ADULT  Goal: Minimal or absence of nausea and vomiting  Description: INTERVENTIONS:  - Maintain adequate hydration with IV or PO as ordered and tolerated  - Nasogastric tube to low intermittent suction as ordered  - Evaluate effectiveness of ordered antiemetic medications  - Provide nonpharmacologic comfort measures as appropriate  - Advance diet as tolerated, if ordered  - Obtain nutritional consult as needed  - Evaluate fluid balance  Outcome: Progressing  Goal: Maintains or returns to baseline bowel function  Description: INTERVENTIONS:  - Assess bowel function  - Maintain adequate  Goal Outcome Evaluation:      Plan of Care Reviewed With: patient    Overall Patient Progress: no changeOverall Patient Progress: no change            hydration with IV or PO as ordered and tolerated  - Evaluate effectiveness of GI medications  - Encourage mobilization and activity  - Obtain nutritional consult as needed  - Establish a toileting routine/schedule  - Consider collaborating with pharmacy to review patient's medication profile  Outcome: Progressing

## 2025-06-01 NOTE — PROGRESS NOTES
[facility]  part of [facility]    Progress Note    Benjamin Rachel Jr. Patient Status:  Inpatient    1950 MRN [id number]   Location [facility] 5SW/SE Attending Marleny Joya, DO   Hosp Day # 3 PCP Cole Yousif MD     Chief complaint - gi bleed, neuropathy     Subjective:   Benjamin Rachel Jr. is a(n) 75 year old male pt reports he cannot walk due to chronic venous insufficiency and bleeding of his toe     ROS:   No cp, sob   No c/d   No n/v     Objective:     Blood pressure 118/50, pulse 67, temperature 98.7 °F (37.1 °C), temperature source Oral, resp. rate 16, height 6' 1\" (1.854 m), weight 225 lb 3.2 oz (102.2 kg), SpO2 98%.      Intake/Output Summary (Last 24 hours) at 2025 1438  Last data filed at 2025 0917  Gross per 24 hour   Intake 1668.98 ml   Output 2524 ml   Net -855.02 ml       Patient Weight(s) for the past 336 hrs:   Weight   25 0418 225 lb 3.2 oz (102.2 kg)   25 0800 230 lb 6.1 oz (104.5 kg)   25 2331 233 lb 7.5 oz (105.9 kg)   25 1714 230 lb (104.3 kg)           General appearance: alert, appears stated age and cooperative  Pulmonary:  clear to auscultation bilaterally  Cardiovascular: S1, S2 normal, no murmur, click, rub or gallop, regular rate and rhythm  Abdominal: soft, non-tender; bowel sounds normal; no masses,  no organomegaly  Extremities: extremities normal, atraumatic, no cyanosis or edema        Medicines:     Current Hospital Medications[1]                Blood Pressure and Cardiac Medications            amLODIPine 10 MG Oral Tab            Medication Infusions[2]        Lab Results   Component Value Date    WBC 11.7 (H) 2025    HGB 7.0 (L) 2025    HCT 21.5 (L) 2025    .0 2025    CREATSERUM 0.92 2025    BUN 17 2025     2025    K 3.7 2025     2025    CO2 26.0 2025     (H) 2025    CA 8.0 (L) 2025    ALB 3.1 (L) 2025     ALKPHO 41 (L) 05/31/2025    BILT 0.6 05/31/2025    TP 5.0 (L) 05/31/2025    AST 29 05/31/2025    ALT 15 05/31/2025    PTT 25.5 05/29/2025    INR 1.25 (H) 05/29/2025    TSH 1.929 11/09/2024    PSA 4.95 (H) 11/09/2024    MG 1.9 05/31/2025    PHOS 2.7 05/31/2025       No results found.        Results:     CBC:    Lab Results   Component Value Date    WBC 11.7 (H) 06/01/2025    WBC 12.8 (H) 05/31/2025    WBC 14.8 (H) 05/29/2025     Lab Results   Component Value Date    HGB 7.0 (L) 06/01/2025    HGB 7.1 (L) 05/31/2025    HGB 9.2 (L) 05/30/2025      Lab Results   Component Value Date    .0 06/01/2025    .0 05/31/2025    .0 05/29/2025       Recent Labs   Lab 05/29/25  1713 05/31/25  0700 06/01/25  0824   * 125* 119*   BUN 57* 20 17   CREATSERUM 1.28 0.94 0.92   CA 8.0* 7.9* 8.0*    141 141   K 4.7 3.7 3.7    110 108   CO2 20.0* 23.0 26.0           Assessment and Plan:    GI bleed  Acute blood loss anemia  Diarrhea  Multiple episodes of diarrhea with black/dark stools at home  Hb 7.5 on admission, baseline ~15 per chart review  Started on IVF, IV Protonix BID  Transfused 1U pRBC  Monitor H&H, transfuse as indicated  GI on consult   EGD 5/30 showed a small Blas class IIc duodenal bulb ulcer along with small erosions.  Biopsies negative for H. pylori.   Appreciate recs - repeat hb later today   transfuse if Hb <7      HTN  BP well controlled  Hold home meds at this time  Monitor vitals and titrate meds as indicated  HLD  Continue statin  Hx of colon polyps and sigmoid diverticulosis  Diagnosed on colonoscopy several years ago  Repeat CLN done in Jan 2025  Regular surveillance in 5 years  Opt GI follow up  Severe Venous stasis  - apply eucerin cream  - wound care consult on Monday           Marleny Joya DO         Chart reviewed, including current vitals, notes, labs and imaging  Labs ordered and medications adjusted as outlined above  Coordinate care with care  team/consultants  Discussed with patient results of tests, management plan as outlined above, and the need for ongoing hospitalization  D/w RN     [facility]        6/1/2025             Supplementary Documentation:   DVT Mechanical Prophylaxis:   SCDs,    DVT Pharmacologic Prophylaxis   Medication   None                Code Status: Full Code  Mercado: No urinary catheter in place  [place]ercado Duration (in days):   Central line:    [facility]: 6/1/2025                            [1]   Current Facility-Administered Medications   Medication Dose Route Frequency    atorvastatin (Lipitor) tab 40 mg  40 mg Oral Daily    pantoprazole (Protonix) 40 mg in sodium chloride 0.9% PF 10 mL IV push  40 mg Intravenous Q12H    sodium chloride 0.9% infusion   Intravenous Continuous    acetaminophen (Tylenol Extra Strength) tab 500 mg  500 mg Oral Q4H PRN    acetaminophen (Tylenol) tab 650 mg  650 mg Oral Q4H PRN    Or    HYDROcodone-acetaminophen (Norco) 5-325 MG per tab 1 tablet  1 tablet Oral Q4H PRN    Or    HYDROcodone-acetaminophen (Norco) 5-325 MG per tab 2 tablet  2 tablet Oral Q4H PRN    ondansetron (Zofran) 4 MG/2ML injection 4 mg  4 mg Intravenous Q6H PRN    metoclopramide (Reglan) 5 mg/mL injection 5 mg  5 mg Intravenous Q8H PRN   [2]    sodium chloride 83 mL/hr at 06/01/25 3055

## 2025-06-02 ENCOUNTER — TELEPHONE (OUTPATIENT)
Dept: INTERNAL MEDICINE CLINIC | Facility: CLINIC | Age: 75
End: 2025-06-02

## 2025-06-02 VITALS
BODY MASS INDEX: 29.11 KG/M2 | HEART RATE: 67 BPM | HEIGHT: 73 IN | SYSTOLIC BLOOD PRESSURE: 115 MMHG | WEIGHT: 219.63 LBS | DIASTOLIC BLOOD PRESSURE: 88 MMHG | RESPIRATION RATE: 18 BRPM | OXYGEN SATURATION: 97 % | TEMPERATURE: 99 F

## 2025-06-02 LAB
DEPRECATED RDW RBC AUTO: 46.3 FL (ref 35.1–46.3)
ERYTHROCYTE [DISTWIDTH] IN BLOOD BY AUTOMATED COUNT: 14.6 % (ref 11–15)
HCT VFR BLD AUTO: 21.5 % (ref 39–53)
HGB BLD-MCNC: 7 G/DL (ref 13–17.5)
MCH RBC QN AUTO: 29 PG (ref 26–34)
MCHC RBC AUTO-ENTMCNC: 32.6 G/DL (ref 31–37)
MCV RBC AUTO: 89.2 FL (ref 80–100)
PLATELET # BLD AUTO: 173 10(3)UL (ref 150–450)
RBC # BLD AUTO: 2.41 X10(6)UL (ref 3.8–5.8)
WBC # BLD AUTO: 11.7 X10(3) UL (ref 4–11)

## 2025-06-02 PROCEDURE — 99233 SBSQ HOSP IP/OBS HIGH 50: CPT | Performed by: INTERNAL MEDICINE

## 2025-06-02 PROCEDURE — 99239 HOSP IP/OBS DSCHRG MGMT >30: CPT | Performed by: HOSPITALIST

## 2025-06-02 RX ORDER — PANTOPRAZOLE SODIUM 40 MG/1
40 TABLET, DELAYED RELEASE ORAL
Qty: 120 TABLET | Refills: 0 | Status: SHIPPED | OUTPATIENT
Start: 2025-06-02 | End: 2025-08-01

## 2025-06-02 RX ORDER — ASPIRIN 81 MG
81 TABLET,CHEWABLE ORAL DAILY
Qty: 1 TABLET | Refills: 0 | Status: SHIPPED | OUTPATIENT
Start: 2025-06-02

## 2025-06-02 NOTE — CM/SW NOTE
06/02/25 1400   Discharge disposition   Expected discharge disposition Home or Self   Post Acute Care Provider Residential   Discharge transportation Private car     SW received MDO for discharge. SW met with pt to provide list and obtain choice for  agency. Pt is agreeable to residential home health. SW reserved via [facility] and notified [facility] of choice. SW discussed transportation home with pt who stated his sister would be picking him up.    PLAN: Home w/ [facility]    SW/CM to remain available for support and/or discharge planning.    MS NicholeW, LSW [phone]

## 2025-06-02 NOTE — OCCUPATIONAL THERAPY NOTE
OCCUPATIONAL THERAPY EVALUATION - INPATIENT     Room Number: 564/564-A  Evaluation Date: 6/2/2025  Type of Evaluation: Initial   Presenting problem: GI hemorrhage; s/p EGD 5/30/25  Co-morbidities: BPH, Chronic venous insufficiency, HTN, HLD, PAD    Physician Order: IP Consult to Occupational Therapy  Reason for Therapy: ADL/IADL Dysfunction and Discharge Planning    OCCUPATIONAL THERAPY ASSESSMENT   Patient is a 75 year old male admitted 5/29/2025 for GI hemorrhage; s/p EGD 5/30/25.  Prior to admission, patient's baseline is independent with ADLs and mobility.  Patient is currently functioning below baseline with lower body dressing, bed mobility, transfers, and functional standing tolerance.  Patient is requiring stand-by assist and contact guard assist as a result of the following impairments: decreased functional strength, decreased endurance, impaired standing balance, decreased muscular endurance, medical status, and decreased safety awareness. Occupational Therapy will continue to follow for duration of hospitalization.    Patient will benefit from continued skilled OT Services at discharge to promote prior level of function and safety with additional support and return home with home health OT.    PLAN DURING HOSPITALIZATION  OT Device Recommendations: None  OT Treatment Plan: Balance activities, Energy conservation/work simplification techniques, ADL training, Functional transfer training, Endurance training, Patient/Family education, Patient/Family training, Compensatory technique education     OCCUPATIONAL THERAPY MEDICAL/SOCIAL HISTORY   Problem List  Principal Problem:    Gastrointestinal hemorrhage, unspecified gastrointestinal hemorrhage type  Active Problems:    Symptomatic anemia    Hemorrhagic shock (HCC)    HOME SITUATION  Type of Home: House  Home Layout: One level  Lives With: Family; Other (Comment) (sister)  Toilet and Equipment: Standard height toilet  Shower/Tub and Equipment: Walk-in shower;  Shower chair  Drives: Yes  Patient Regularly Uses: None    Prior Level of [place]: Prior to admission pt was independent with ADLs and mobility.     SUBJECTIVE  \"Thank goodness I live with my sister.\"     OCCUPATIONAL THERAPY EXAMINATION      OBJECTIVE  Precautions: Limb alert - left; Limb alert - right  Fall Risk: High fall risk      PAIN ASSESSMENT  Ratin      ACTIVITY TOLERANCE  Pulse: 84  Heart Rate Source: Monitor     BP: 150/71  BP Location: Right arm  BP Method: Automatic  Patient Position: Sitting    O2 SATURATIONS  Oxygen Therapy  SPO2% on Room Air at Rest: 97    COGNITION  Overall Cognitive Status:  WFL - within functional limits  Safety Judgement:  decreased awareness of need for assistance and decreased awareness of need for safety    RANGE OF MOTION   Upper extremity ROM is within functional limits     STRENGTH ASSESSMENT  Upper extremity strength is within functional limits     COORDINATION  Gross Motor: WFL   Fine Motor: WFL     ACTIVITIES OF DAILY LIVING ASSESSMENT  AM-PAC ‘6-Clicks’ Inpatient Daily Activity Short Form  How much help from another person does the patient currently need…  -   Putting on and taking off regular lower body clothing?: A Little  -   Bathing (including washing, rinsing, drying)?: A Little  -   Toileting, which includes using toilet, bedpan or urinal? : A Little  -   Putting on and taking off regular upper body clothing?: None  -   Taking care of personal grooming such as brushing teeth?: A Little  -   Eating meals?: None    AM-PAC Score:  Score: 20  Approx Degree of Impairment: 38.32%  Standardized Score (AM-PAC Scale): 42.03  CMS Modifier (G-Code): CJ    FUNCTIONAL TRANSFER ASSESSMENT  Sit to Stand: Edge of Bed  Edge of Bed: Contact Guard Assist  Simulated toilet transfer: CGA no device     FUNCTIONAL MOBILITY  Pt required CGA with no device to complete room distance mobility to simulate household distances.     BED MOBILITY  Supine to Sit : Stand-by Assist  Sit to  Supine (OT): Stand-by Assist    BALANCE ASSESSMENT  Static Sitting: Supervision  Static Standing: Contact Guard Assist  Dynamic sitting: SBA  Dynamic Standing: CGA      FUNCTIONAL ADL ASSESSMENT  LB Dressing Seated: Stand-by Assist  LB Dressing Standing: Contact Guard Assist    Skilled Therapy Provided:   RN cleared pt for participation. Pt received in bed with no visitors present. Pt agreeable to participation in therapy. To assess pt's participation during tasks while also providing intermittent education to maximize participation, OT facilitated the following: bed mobility, functional transfers, mobility, and ADL tasks. Pt tolerated treatment fairly well and completed all tasks with assist levels listed above. After completing STS and brief room mobility, pt noted to have bleeding from wound on right toe; RN notified. Pt remained in bed with all needs in reach and alarm on at end of session. RN aware.       EDUCATION PROVIDED  Patient Education : Plan of Care; Role of Occupational Therapy; Functional Transfer Techniques; Fall Prevention; Posture/Positioning; Energy Conservation; Proper Body Mechanics  Patient's Response to Education: Verbalized Understanding; Returned Demonstration    The patient's Approx Degree of Impairment: 38.32% has been calculated based on documentation in the [facility] '6 clicks' Inpatient Daily Activity Short Form.  Research supports that patients with this level of impairment may benefit from HHOT.  Final disposition will be made by interdisciplinary medical team.     Patient End of Session: In bed, Needs met, Call light within reach, RN aware of session/findings, All patient questions and concerns addressed, Hospital anti-slip socks, Alarm set    OT Goals  Patients self stated goal is: none stated      Patient will complete functional transfer with mod I   Comment:     Patient will complete toileting with mod I   Comment:     Patient will tolerate standing for 5 minutes in prep for adls  with mod I    Comment:    Patient will complete item retrieval with mod I   Comment:          Goals  on: 25  Frequency: 3-5x/week     Patient Evaluation Complexity Level:   Occupational Profile/Medical History MODERATE - Expanded review of history including review of medical or therapy record   Specific performance deficits impacting engagement in ADL/IADL MODERATE  3 - 5 performance deficits   Client Assessment/Performance Deficits MODERATE - Comorbidities and min to mod modifications of tasks    Clinical Decision Making MODERATE - Analysis of occupational profile, detailed assessments, several treatment options    Overall Complexity MODERATE     OT Session Time: 23 minutes  Self-Care Home Management: 23 minutes

## 2025-06-02 NOTE — HOME CARE LIAISON
[facility] Spoke w/ patient who is agreeable with Residential [facility]. Contact information placed on AVS.

## 2025-06-02 NOTE — PLAN OF CARE
Problem: Patient Centered Care  Goal: Patient preferences are identified and integrated in the patient's plan of care  Description: Interventions:  - What would you like us to know as we care for you?   - Provide timely, complete, and accurate information to patient/family  - Incorporate patient and family knowledge, values, beliefs, and cultural backgrounds into the planning and delivery of care  - Encourage patient/family to participate in care and decision-making at the level they choose  - Honor patient and family perspectives and choices  Outcome: Progressing     Problem: PAIN - ADULT  Goal: Verbalizes/displays adequate comfort level or patient's stated pain goal  Description: INTERVENTIONS:  - Encourage pt to monitor pain and request assistance  - Assess pain using appropriate pain scale  - Administer analgesics based on type and severity of pain and evaluate response  - Implement non-pharmacological measures as appropriate and evaluate response  - Consider cultural and social influences on pain and pain management  - Manage/alleviate anxiety  - Utilize distraction and/or relaxation techniques  - Monitor for opioid side effects  - Notify MD/LIP if interventions unsuccessful or patient reports new pain  - Anticipate increased pain with activity and pre-medicate as appropriate  Outcome: Progressing     Problem: GASTROINTESTINAL - ADULT  Goal: Minimal or absence of nausea and vomiting  Description: INTERVENTIONS:  - Maintain adequate hydration with IV or PO as ordered and tolerated  - Nasogastric tube to low intermittent suction as ordered  - Evaluate effectiveness of ordered antiemetic medications  - Provide nonpharmacologic comfort measures as appropriate  - Advance diet as tolerated, if ordered  - Obtain nutritional consult as needed  - Evaluate fluid balance  Outcome: Progressing  Goal: Maintains or returns to baseline bowel function  Description: INTERVENTIONS:  - Assess bowel function  - Maintain adequate  hydration with IV or PO as ordered and tolerated  - Evaluate effectiveness of GI medications  - Encourage mobilization and activity  - Obtain nutritional consult as needed  - Establish a toileting routine/schedule  - Consider collaborating with pharmacy to review patient's medication profile  Outcome: Progressing

## 2025-06-02 NOTE — DISCHARGE SUMMARY
[facility]  part of [facility]    Discharge Summary    Benjamin Rachel Jr. Patient Status:  Inpatient    1950 MRN [id number]   Location [facility] 5SW/SE Attending No att. providers found   Hosp Day # 4 PCP Cole Yousif MD     Date of Admission: 2025 Disposition: Home or Self Care     Date of Discharge: GI Bleed     Admitting Diagnosis: Hemorrhagic shock (HCC) [R57.8]  Symptomatic anemia [D64.9]  Gastrointestinal hemorrhage, unspecified gastrointestinal hemorrhage type [K92.2]    Hospital Discharge Diagnoses: gi bleed     Hospital Discharge Diagnoses: gi bleed     Lace+ Score: 45  59-90 High Risk  29-58 Medium Risk  0-28   Low Risk.    TCM Follow-Up Recommendation:  LACE 29-58: Moderate Risk of readmission after discharge from the hospital.          Lace+ Score: 45  59-90 High Risk  29-58 Medium Risk  0-28   Low Risk    Risk of readmission: Benjamin Rachel Jr. has Moderate Risk of readmission after discharge from the hospital.    Problem List: Problem List[1]    Reason for Admission: gi bleed     Physical Exam:   General appearance: alert, appears stated age and cooperative  Pulmonary:  clear to auscultation bilaterally  Cardiovascular: S1, S2 normal, no murmur, click, rub or gallop, regular rate and rhythm  Abdominal: soft, non-tender; bowel sounds normal; no masses,  no organomegaly  Extremities: extremities normal, atraumatic, no cyanosis or edema  Psychiatric: calm        History of Present Illness:     This is a 75 year oldmale who presented complaining of diarrhea.  Patient stated symptoms started on the evening prior to admission.  Woke him from sleep.  Described multiple episodes up to 17 times overnight.  Eventually he began feeling generally weak and fatigued.  At one point he felt off balance and had a fall.  Upon further review, patient did note dark black stools.  Patient denied associated abdominal discomfort.  Patient states he is otherwise been in normal  state of health this past week.  Denied recent NSAID use.  Patient takes low-dose aspirin daily.  Patient denies other anticoagulation.  At time of interview, patient reports diarrhea decreasing in frequency.  Feeling generally better.  Denies current chest pain, shortness of breath, nausea vomiting, fevers or chills.       Hospital Course:        GI bleed  Acute blood loss anemia  Diarrhea  Multiple episodes of diarrhea with black/dark stools at home  Hb 7.5 on admission, baseline ~15 per chart review  Started on IVF, IV Protonix BID - changed to 40 bid for 8 weeks at dc   Transfused 1U pRBC  Monitor H&H, transfuse as indicated  GI on consult   EGD 5/30 showed a small Blas class IIc duodenal bulb ulcer along with small erosions.  Biopsies negative for H. pylori.   Appreciate recs - repeat hb later today and if stable ok for dc   transfuse if Hb <7      HTN  BP well controlled  Hold home meds at this time  Monitor vitals and titrate meds as indicated  HLD  Continue statin  Hx of colon polyps and sigmoid diverticulosis  Diagnosed on colonoscopy several years ago  Repeat CLN done in Jan 2025  Regular surveillance in 5 years  Opt GI follow up  Severe Venous stasis  - apply eucerin cream  - wound care consult on Monday - apprec input ; follows at u michael Joya,            Chart reviewed, including current vitals, notes, labs and imaging  Labs ordered and medications adjusted as outlined above  Coordinate care with care team/consultants  Discussed with patient results of tests, management plan as outlined above, and the need for ongoing hospitalization  D/w RN     [facility]           6/1/2025                  Supplementary Documentation:   DVT Mechanical Prophylaxis:   SCDs,    DVT Pharmacologic Prophylaxis   Medication   None                 Code Status: Full Code  Mercado: No urinary catheter in place  Mercado Duration (in days):   Central line:    KAIA: 6/1/2025                             Consultations: Dr Vuong     Procedures: EGD     Complications: none     Discharge Condition: Good    Discharge Medications:      Discharge Medications        START taking these medications        Instructions Prescription details   pantoprazole 40 MG Tbec  Commonly known as: Protonix      Take 1 tablet (40 mg total) by mouth 2 (two) times daily before meals.   Stop taking on: August 1, 2025  Quantity: 120 tablet  Refills: 0            CONTINUE taking these medications        Instructions Prescription details   amLODIPine 10 MG Tabs  Commonly known as: Norvasc      Take 1 tablet (10 mg total) by mouth daily.   Quantity: 90 tablet  Refills: 3     Aspirin Low Dose 81 MG Chew  Generic drug: aspirin      Chew 1 tablet (81 mg total) by mouth in the evening.   Quantity: 1 tablet  Refills: 0     atorvastatin 40 MG Tabs  Commonly known as: Lipitor      Take 1 tablet (40 mg total) by mouth daily.   Quantity: 90 tablet  Refills: 3            STOP taking these medications      lactulose 10 GM/15ML Soln  Commonly known as: CHRONULAC                  Where to Get Your Medications        These medications were sent to [id number] - [address] [phone], [phone]  [address] [place] [id number]      Phone: [phone]   Aspirin Low Dose 81 MG Chew  pantoprazole 40 MG Tbec         Follow up Visits: Follow-up with pcp  in 1 week    Follow up Labs: none      Other Discharge Instructions: f/u with GI in 2 weeks     Marleny Joya DO  6/2/2025  5:50 PM    > 35 min        [1]   Patient Active Problem List  Diagnosis    Pulsatile tinnitus of left ear    History of colon polyps    Gastrointestinal hemorrhage, unspecified gastrointestinal hemorrhage type    Symptomatic anemia    Hemorrhagic shock (HCC)

## 2025-06-02 NOTE — TELEPHONE ENCOUNTER
Jaz from [facility] is calling and asking if Dr Yousif would over see and sign orders for home health services.    Please advise

## 2025-06-02 NOTE — PROGRESS NOTES
[facility]     Gastroenterology Progress Note    Benjamin Rachel Jr. Patient Status:  Inpatient    1950 MRN [id number]   Location [facility] 5SW/SE Attending Mery Monsalve MD   Hosp Day # 4 PCP Cole Yousif MD       Subjective:   He feels well this morning.  No abdominal pain, nausea, emesis.  Tolerating diet.    Objective:   Blood pressure 150/71, pulse 84, temperature 98.8 °F (37.1 °C), temperature source Oral, resp. rate 20, height 6' 1\" (1.854 m), weight 219 lb 9.6 oz (99.6 kg), SpO2 97%. Body mass index is 28.97 kg/m².    General: awake, alert and oriented, no acute distress  HEENT: moist mucus membranes  PULM: no conversational dyspnea  CARDIOVASCULAR: regular rate and rhythm, the extremities are warm and well perfused  GI: soft, non-tender, non-distended, + BS, no rebound/guarding   EXTREMITIES: no edema, moving all extremities  SKIN: no visible rash  NEURO: appropriate and interactive    Assessment and Plan:   75M on PAD on aspirin who presents with melena and ABLA. Hgb 7.5 s/p 2 unit prbc transfusion on admission.  Infectious stool studies including C. difficile and GI stool PCR negative.  EGD  showed a small Blas class IIc duodenal bulb ulcer along with small erosions.  Biopsies negative for H. pylori.    No further overt GI bleeding  Hgb 7.7 and sustained  Continue PPI BID x 8 weeks then daily thereafter    Follow-up gi clinic      Clarisa Vuong MD  [facility] Gastroenterology      Results:     Lab Results   Component Value Date    WBC 11.7 (H) 2025    HGB 7.7 (L) 2025    HCT 21.5 (L) 2025    .0 2025    CREATSERUM 0.92 2025    BUN 17 2025     2025    K 3.7 2025     2025    CO2 26.0 2025     (H) 2025    CA 8.0 (L) 2025    ALB 3.1 (L) 2025    ALKPHO 41 (L) 2025    BILT 0.6 2025    TP 5.0 (L) 2025    AST 29 2025    ALT 15 2025     PTT 25.5 05/29/2025    INR 1.25 (H) 05/29/2025    TSH 1.929 11/09/2024    PSA 4.95 (H) 11/09/2024    MG 1.9 05/31/2025    PHOS 2.7 05/31/2025       No results found.

## 2025-06-02 NOTE — PROGRESS NOTES
06/02/25 0827   Wound 06/02/25 Toe (Comment which one) Dorsal;Right   Date First Assessed/Time First Assessed: 06/02/25 0822   Present on Original Admission: Yes  Primary Wound Type: Arterial Ulcer  Location: (c) Toe (Comment which one)  Wound Location Orientation: (c) Dorsal;Right  Wound Description (Comments): 2nd toe   Wound Image    Site Assessment Fragile;Moist;Painful;Pink;Red   Closure Not approximated   Drainage Amount Small   Drainage Description Sanguineous   Treatments Cleansed;Saline;Site Care   Dressing Xeroform;2x2s;Band-Aid   Dressing Changed Changed   Dressing Status Clean;Dry;Intact;Dressing Changed   Wound Length (cm) 3.2 cm   Wound Width (cm) 0.8 cm   Wound Surface Area (cm^2) 2.01 cm^2   Wound Depth (cm) 0.1 cm   Wound Volume (cm^3) 0.134 cm^3   Margins Not attached   Non-staged Wound Description Partial thickness   María Elena-wound Assessment Fragile;Edema;Pink   Wound Granulation Tissue Red;Pink   Wound Bed Granulation (%) 100 %   State of Healing Early/partial granulation   Wound Odor None   Shape irregular   Tunneling? No   Undermining? No   Sinus Tracts? No   Wound 06/02/25 Toe (Comment which one)    Date First Assessed/Time First Assessed: 06/02/25 0823   Present on Original Admission: Yes  Primary Wound Type: Abrasion  Location: (c) Toe (Comment which one)  Wound Location Orientation: (c)   Wound Description (Comments): rt 4th toe   Wound Image    Site Assessment Dry;Fragile;Pink  (bloody scab)   Closure Not approximated   Drainage Amount None   Treatments Cleansed;Saline;Site Care   Dressing Xeroform;Band-Aid   Dressing Changed New   Dressing Status Clean;Dry;Intact   Wound Length (cm) 1 cm   Wound Width (cm) 1.2 cm   Wound Surface Area (cm^2) 0.94 cm^2   Wound Depth (cm) 0.1 cm   Wound Volume (cm^3) 0.063 cm^3   Margins Attached edges   Non-staged Wound Description Partial thickness   María Elena-wound Assessment Fragile;Pink   State of Healing Non-healing   Wound Odor None   Tunneling? No    Undermining? No   Sinus Tracts? No   Wound 06/02/25 Toe (Comment which one) Dorsal;Left   Date First Assessed/Time First Assessed: 06/02/25 0829   Present on Original Admission: Yes  Primary Wound Type: Abrasion  Location: (c) Toe (Comment which one)  Wound Location Orientation: (c) Dorsal;Left  Wound Description (Comments): left 3rd, 5th ...   Wound Image   (pic did not save)   Site Assessment Pink;Fragile;Moist;Painful   Closure Not approximated   Drainage Amount None   Treatments Cleansed;Saline;Site Care   Dressing Xeroform;Band-Aid   Dressing Changed New   Dressing Status Clean;Dry;Intact   Wound Length (cm) 1 cm   Wound Width (cm) 0.6 cm   Wound Surface Area (cm^2) 0.47 cm^2   Wound Depth (cm) 0.1 cm   Wound Volume (cm^3) 0.031 cm^3   Margins Not attached   Non-staged Wound Description Partial thickness   María Elena-wound Assessment Fragile;Pink   State of Healing Early/partial granulation   Wound Odor None   Shape 5th toe 0.5 cm 0.5 cm x 0.1 c,   Tunneling? No   Undermining? No   Sinus Tracts? No   Wound Follow Up   Follow up needed Yes     WOUND CARE NOTE    History:  Past Medical History[1]  Past Surgical History[2]   Short Social Hx on File[3]        PLAN   Recommendations:  GI is following the pt.   Heels elevated using pillows to offload heels  Offload toe wounds  To prevent sliding: decrease head of bed and elevate foot of bed as medical condition tolerates  Glucose control to help promote wound healing    Wound(s)  Location: Right 2nd toe  Cleansing  Saline   Dressings xeroform gauze cut to fit over the wound, 2x2s gauze  Secure with band aide or gauze and paper tape  Frequency daily   Location: Right 4th toe and Left 3rd and 5th toes  Cleansing  Saline   Dressings xeroform gauze cut to fit over the wounds  Secure with band aide  Frequency daily       Discharge Recommendations: Pt may need assist after discharge. PT is following the pt.       OBJECTIVE   MD Consult new toe wounds      ASSESSMENT   Danyel  Score:  Danyel Scale Score: 16    Chart Reviewed: yes    Wound(s):  The pt.  is resting in bed, he is agreeable for wound care assessment, per the pt. because of his pad, he cannot tolerate socks and regular shoes. He wears flip flops all year round and per pt. barefoot at home. Per the pt. he had a fall in the restroom at home and scraped his toes. See the wound assessments, feet are warm, pink, painful. Wounds were cleansed and dressed. The pt. asked for minimal dressings due to the need to get his flip flops on, Educated on skin and wound care, dressings, moist wound healing, nutrition. All questions answered. I gave the pt. dressing instructions and some supplies for when he goes home. Spoke with the nurse.          Allergies: Soap    Labs:   Lab Results   Component Value Date    WBC 11.7 (H) 06/01/2025    HGB 7.7 (L) 06/01/2025    HCT 21.5 (L) 06/01/2025    .0 06/01/2025    CREATSERUM 0.92 06/01/2025    BUN 17 06/01/2025     06/01/2025    K 3.7 06/01/2025     06/01/2025    CO2 26.0 06/01/2025     (H) 06/01/2025    CA 8.0 (L) 06/01/2025    ALB 3.1 (L) 05/31/2025    ALKPHO 41 (L) 05/31/2025    BILT 0.6 05/31/2025    TP 5.0 (L) 05/31/2025    AST 29 05/31/2025    ALT 15 05/31/2025    MG 1.9 05/31/2025    PHOS 2.7 05/31/2025     No results found for: \"PREALBUMIN\"      Time Spent 1 Hour.    Lauren Melgar RN  [facility]  [facility]  [phone]         [1]   Past Medical History:   BPH (benign prostatic hyperplasia)    Chronic venous insufficiency of lower extremity    Contact dermatitis    to legs; since 1988    Essential hypertension    High blood pressure    High cholesterol    History of right inguinal hernia repair    age 7     Nasal polyps    ENT Dr Quintana    PAD (peripheral artery disease)    S/P LASIK surgery of both eyes    Toe deformity    since 1988   [2]   Past Surgical History:  Procedure Laterality Date    Colonoscopy N/A 8/10/2021    Procedure:  COLONOSCOPY;  Surgeon: Fareed Jackson MD;  Location: [facility] ENDOSCOPY    Colonoscopy N/A 1/9/2025    Procedure: COLONOSCOPY;  Surgeon: Fareed Jackson MD;  Location: [facility] ENDOSCOPY    Excision turbinate,submucous  12/27/2019    Eye surgery      Hernia surgery      Nasal scope,bx/rmv polyp/debrid  12/27/2019    Nasal scopy,explor frontal sinus  12/27/2019    Nasal scopy,remv part ethmoid  12/27/2019    Nasal scopy,remv tiss sphenoid  12/27/2019    Nasal scopy,rmv tiss maxill sinus  12/27/2019   [3]   Social History  Socioeconomic History    Marital status: Single   Tobacco Use    Smoking status: Never    Smokeless tobacco: Never   Vaping Use    Vaping status: Never Used   Substance and Sexual Activity    Alcohol use: No     Comment: Over 40 yrs    Drug use: Never     Social Drivers of Health     Food Insecurity: No Food Insecurity (5/29/2025)    NCSS - Food Insecurity     Worried About Running Out of Food in the Last Year: No     Ran Out of Food in the Last Year: No   Transportation Needs: No Transportation Needs (5/29/2025)    NCSS - Transportation     Lack of Transportation: No   Housing Stability: Not At Risk (5/29/2025)    NCSS - Housing/Utilities     Has Housing: Yes     Worried About Losing Housing: No     Unable to Get Utilities: No

## 2025-06-02 NOTE — PLAN OF CARE
Problem: Patient Centered Care  Goal: Patient preferences are identified and integrated in the patient's plan of care  Description: Interventions:  - What would you like us to know as we care for you? From home with sister  - Provide timely, complete, and accurate information to patient/family  - Incorporate patient and family knowledge, values, beliefs, and cultural backgrounds into the planning and delivery of care  - Encourage patient/family to participate in care and decision-making at the level they choose  - Honor patient and family perspectives and choices  Outcome: Progressing     Problem: Patient/Family Goals  Goal: Patient/Family Long Term Goal  Description: Patient's Long Term Goal: resolve low hemoglobin    Interventions:  - medical intervention  - See additional Care Plan goals for specific interventions  Outcome: Progressing  Goal: Patient/Family Short Term Goal  Description: Patient's Short Term Goal: heal toe wounds    Interventions:   - daily dressing changes  - See additional Care Plan goals for specific interventions  Outcome: Progressing     Problem: PAIN - ADULT  Goal: Verbalizes/displays adequate comfort level or patient's stated pain goal  Description: INTERVENTIONS:  - Encourage pt to monitor pain and request assistance  - Assess pain using appropriate pain scale  - Administer analgesics based on type and severity of pain and evaluate response  - Implement non-pharmacological measures as appropriate and evaluate response  - Consider cultural and social influences on pain and pain management  - Manage/alleviate anxiety  - Utilize distraction and/or relaxation techniques  - Monitor for opioid side effects  - Notify MD/LIP if interventions unsuccessful or patient reports new pain  - Anticipate increased pain with activity and pre-medicate as appropriate  Outcome: Progressing     Problem: GASTROINTESTINAL - ADULT  Goal: Minimal or absence of nausea and vomiting  Description: INTERVENTIONS:  -  Maintain adequate hydration with IV or PO as ordered and tolerated  - Nasogastric tube to low intermittent suction as ordered  - Evaluate effectiveness of ordered antiemetic medications  - Provide nonpharmacologic comfort measures as appropriate  - Advance diet as tolerated, if ordered  - Obtain nutritional consult as needed  - Evaluate fluid balance  Outcome: Progressing  Goal: Maintains or returns to baseline bowel function  Description: INTERVENTIONS:  - Assess bowel function  - Maintain adequate hydration with IV or PO as ordered and tolerated  - Evaluate effectiveness of GI medications  - Encourage mobilization and activity  - Obtain nutritional consult as needed  - Establish a toileting routine/schedule  - Consider collaborating with pharmacy to review patient's medication profile  Outcome: Progressing

## 2025-06-03 LAB
BLOOD TYPE BARCODE: 5100
UNIT VOLUME: 350 ML

## 2025-06-11 ENCOUNTER — TELEPHONE (OUTPATIENT)
Facility: CLINIC | Age: 75
End: 2025-06-11

## 2025-06-11 NOTE — TELEPHONE ENCOUNTER
The patient called to follow up with Dr. Vuong. The patient states that he was hospitalized and was told to follow up in a week. Please call.

## 2025-06-11 NOTE — TELEPHONE ENCOUNTER
Kaitlin,    I spoke to the pt     We scheduled a hospital follow up    Pt asking if we can change appointment to a virtual telephone call    He cut his foot on flip flops and can't leave the house

## 2025-06-12 NOTE — TELEPHONE ENCOUNTER
I spoke to the patient    I informed him that GI provider is ok with switching his in person visit to a virtual visit, he verbalized understanding    Patient now scheduled for virtual appointment on 6/13/2025 at 9 am    Phone number, date and time verified with the patient    Patient has no further questions at this time

## 2025-06-13 ENCOUNTER — TELEPHONE (OUTPATIENT)
Facility: CLINIC | Age: 75
End: 2025-06-13

## 2025-06-13 NOTE — TELEPHONE ENCOUNTER
Bella, [facility] calling to speak with nurse. Patient needs blood work, but is home snyder. Bella can complete his blood work at his home, but will need verbal for which labs are needed. Please call at [phone],ok to leave detailed message on secure voicemail, thanks.   *Patient will be seen on Monday

## 2025-06-13 NOTE — TELEPHONE ENCOUNTER
I spoke to bella from [facility] [employer] ([phone])    I informed Bella that patient completed virtual visit with Kaitlin Aceves today and blood work was ordered (CBC, ferritin, iron and tibc)    Bella states that she sees patient on Monday and will draw labs    I provided Bella GI fax number    Bella verbalized understanding and has no further questions at this time

## 2025-06-16 ENCOUNTER — LAB REQUISITION (OUTPATIENT)
Dept: LAB | Facility: HOSPITAL | Age: 75
End: 2025-06-16
Payer: MEDICARE

## 2025-06-16 ENCOUNTER — HOSPITAL ENCOUNTER (INPATIENT)
Facility: HOSPITAL | Age: 75
LOS: 3 days | Discharge: HOME HEALTH | End: 2025-06-19
Attending: EMERGENCY MEDICINE | Admitting: HOSPITALIST
Payer: MEDICARE

## 2025-06-16 ENCOUNTER — LAB REQUISITION (OUTPATIENT)
Dept: LAB | Facility: HOSPITAL | Age: 75
DRG: 812 | End: 2025-06-16
Payer: MEDICARE

## 2025-06-16 ENCOUNTER — HOSPITAL ENCOUNTER (INPATIENT)
Facility: HOSPITAL | Age: 75
LOS: 3 days | Discharge: HOME HEALTH | DRG: 812 | End: 2025-06-19
Attending: EMERGENCY MEDICINE | Admitting: HOSPITALIST
Payer: MEDICARE

## 2025-06-16 DIAGNOSIS — D64.9 ANEMIA, UNSPECIFIED TYPE: Primary | ICD-10-CM

## 2025-06-16 DIAGNOSIS — K92.2 GASTROINTESTINAL HEMORRHAGE, UNSPECIFIED GASTROINTESTINAL HEMORRHAGE TYPE: ICD-10-CM

## 2025-06-16 DIAGNOSIS — D64.9 ANEMIA, UNSPECIFIED: ICD-10-CM

## 2025-06-16 LAB
ANION GAP SERPL CALC-SCNC: 7 MMOL/L (ref 0–18)
ANTIBODY SCREEN: NEGATIVE
BASOPHILS # BLD AUTO: 0.05 X10(3) UL (ref 0–0.2)
BASOPHILS # BLD AUTO: 0.06 X10(3) UL (ref 0–0.2)
BASOPHILS NFR BLD AUTO: 0.7 %
BASOPHILS NFR BLD AUTO: 0.8 %
BUN BLD-MCNC: 19 MG/DL (ref 9–23)
BUN/CREAT SERPL: 14.8 (ref 10–20)
CALCIUM BLD-MCNC: 9.2 MG/DL (ref 8.7–10.4)
CHLORIDE SERPL-SCNC: 106 MMOL/L (ref 98–112)
CO2 SERPL-SCNC: 25 MMOL/L (ref 21–32)
CREAT BLD-MCNC: 1.28 MG/DL (ref 0.7–1.3)
DEPRECATED HBV CORE AB SER IA-ACNC: 19 NG/ML (ref 50–336)
DEPRECATED RDW RBC AUTO: 50.8 FL (ref 35.1–46.3)
DEPRECATED RDW RBC AUTO: 51.9 FL (ref 35.1–46.3)
EGFRCR SERPLBLD CKD-EPI 2021: 59 ML/MIN/1.73M2 (ref 60–?)
EOSINOPHIL # BLD AUTO: 0.37 X10(3) UL (ref 0–0.7)
EOSINOPHIL # BLD AUTO: 0.39 X10(3) UL (ref 0–0.7)
EOSINOPHIL NFR BLD AUTO: 5 %
EOSINOPHIL NFR BLD AUTO: 5.2 %
ERYTHROCYTE [DISTWIDTH] IN BLOOD BY AUTOMATED COUNT: 17 % (ref 11–15)
ERYTHROCYTE [DISTWIDTH] IN BLOOD BY AUTOMATED COUNT: 17.1 % (ref 11–15)
GLUCOSE BLD-MCNC: 150 MG/DL (ref 70–99)
HCT VFR BLD AUTO: 22.3 % (ref 39–53)
HCT VFR BLD AUTO: 23.1 % (ref 39–53)
HGB BLD-MCNC: 6.5 G/DL (ref 13–17.5)
HGB BLD-MCNC: 7 G/DL (ref 13–17.5)
HGB BLD-MCNC: 7.5 G/DL (ref 13–17.5)
IMM GRANULOCYTES # BLD AUTO: 0.03 X10(3) UL (ref 0–1)
IMM GRANULOCYTES # BLD AUTO: 0.05 X10(3) UL (ref 0–1)
IMM GRANULOCYTES NFR BLD: 0.4 %
IMM GRANULOCYTES NFR BLD: 0.6 %
IRON SATN MFR SERPL: 5 % (ref 20–50)
IRON SERPL-MCNC: 19 UG/DL (ref 65–175)
LYMPHOCYTES # BLD AUTO: 1.8 X10(3) UL (ref 1–4)
LYMPHOCYTES # BLD AUTO: 1.97 X10(3) UL (ref 1–4)
LYMPHOCYTES NFR BLD AUTO: 25.2 %
LYMPHOCYTES NFR BLD AUTO: 25.4 %
MCH RBC QN AUTO: 24.3 PG (ref 26–34)
MCH RBC QN AUTO: 25 PG (ref 26–34)
MCHC RBC AUTO-ENTMCNC: 29.1 G/DL (ref 31–37)
MCHC RBC AUTO-ENTMCNC: 30.3 G/DL (ref 31–37)
MCV RBC AUTO: 82.5 FL (ref 80–100)
MCV RBC AUTO: 83.5 FL (ref 80–100)
MONOCYTES # BLD AUTO: 0.4 X10(3) UL (ref 0.1–1)
MONOCYTES # BLD AUTO: 0.55 X10(3) UL (ref 0.1–1)
MONOCYTES NFR BLD AUTO: 5.2 %
MONOCYTES NFR BLD AUTO: 7.7 %
NEUTROPHILS # BLD AUTO: 4.35 X10 (3) UL (ref 1.5–7.7)
NEUTROPHILS # BLD AUTO: 4.35 X10(3) UL (ref 1.5–7.7)
NEUTROPHILS # BLD AUTO: 4.88 X10 (3) UL (ref 1.5–7.7)
NEUTROPHILS # BLD AUTO: 4.88 X10(3) UL (ref 1.5–7.7)
NEUTROPHILS NFR BLD AUTO: 60.8 %
NEUTROPHILS NFR BLD AUTO: 63 %
OSMOLALITY SERPL CALC.SUM OF ELEC: 291 MOSM/KG (ref 275–295)
PLATELET # BLD AUTO: 390 10(3)UL (ref 150–450)
PLATELET # BLD AUTO: 402 10(3)UL (ref 150–450)
POTASSIUM SERPL-SCNC: 4 MMOL/L (ref 3.5–5.1)
RBC # BLD AUTO: 2.67 X10(6)UL (ref 3.8–5.8)
RBC # BLD AUTO: 2.8 X10(6)UL (ref 3.8–5.8)
RH BLOOD TYPE: POSITIVE
SODIUM SERPL-SCNC: 138 MMOL/L (ref 136–145)
TOTAL IRON BINDING CAPACITY: 408 UG/DL (ref 250–425)
TRANSFERRIN SERPL-MCNC: 322 MG/DL (ref 215–365)
WBC # BLD AUTO: 7.2 X10(3) UL (ref 4–11)
WBC # BLD AUTO: 7.8 X10(3) UL (ref 4–11)

## 2025-06-16 PROCEDURE — 84466 ASSAY OF TRANSFERRIN: CPT | Performed by: PHYSICIAN ASSISTANT

## 2025-06-16 PROCEDURE — 99222 1ST HOSP IP/OBS MODERATE 55: CPT | Performed by: HOSPITALIST

## 2025-06-16 PROCEDURE — 85025 COMPLETE CBC W/AUTO DIFF WBC: CPT | Performed by: PHYSICIAN ASSISTANT

## 2025-06-16 PROCEDURE — 83540 ASSAY OF IRON: CPT | Performed by: PHYSICIAN ASSISTANT

## 2025-06-16 PROCEDURE — 82728 ASSAY OF FERRITIN: CPT | Performed by: PHYSICIAN ASSISTANT

## 2025-06-16 PROCEDURE — 30233N1 TRANSFUSION OF NONAUTOLOGOUS RED BLOOD CELLS INTO PERIPHERAL VEIN, PERCUTANEOUS APPROACH: ICD-10-PCS | Performed by: HOSPITALIST

## 2025-06-16 PROCEDURE — 99223 1ST HOSP IP/OBS HIGH 75: CPT | Performed by: INTERNAL MEDICINE

## 2025-06-16 RX ORDER — METOCLOPRAMIDE HYDROCHLORIDE 5 MG/ML
5 INJECTION INTRAMUSCULAR; INTRAVENOUS EVERY 8 HOURS PRN
Status: DISCONTINUED | OUTPATIENT
Start: 2025-06-16 | End: 2025-06-19

## 2025-06-16 RX ORDER — POLYETHYLENE GLYCOL 3350 17 G/17G
17 POWDER, FOR SOLUTION ORAL DAILY PRN
Status: DISCONTINUED | OUTPATIENT
Start: 2025-06-16 | End: 2025-06-19

## 2025-06-16 RX ORDER — FLUTICASONE PROPIONATE 50 MCG
1 SPRAY, SUSPENSION (ML) NASAL 2 TIMES DAILY
Status: DISCONTINUED | OUTPATIENT
Start: 2025-06-16 | End: 2025-06-19

## 2025-06-16 RX ORDER — AMLODIPINE BESYLATE 10 MG/1
10 TABLET ORAL DAILY
Status: DISCONTINUED | OUTPATIENT
Start: 2025-06-17 | End: 2025-06-19

## 2025-06-16 RX ORDER — MAGNESIUM CARB/ALUMINUM HYDROX 105-160MG
296 TABLET,CHEWABLE ORAL ONCE
Status: COMPLETED | OUTPATIENT
Start: 2025-06-16 | End: 2025-06-16

## 2025-06-16 RX ORDER — SODIUM PHOSPHATE, DIBASIC AND SODIUM PHOSPHATE, MONOBASIC 7; 19 G/230ML; G/230ML
1 ENEMA RECTAL ONCE AS NEEDED
Status: DISCONTINUED | OUTPATIENT
Start: 2025-06-16 | End: 2025-06-19

## 2025-06-16 RX ORDER — BISACODYL 10 MG
10 SUPPOSITORY, RECTAL RECTAL
Status: DISCONTINUED | OUTPATIENT
Start: 2025-06-16 | End: 2025-06-19

## 2025-06-16 RX ORDER — FLUTICASONE PROPIONATE 50 MCG
1 SPRAY, SUSPENSION (ML) NASAL 2 TIMES DAILY
COMMUNITY

## 2025-06-16 RX ORDER — ACETAMINOPHEN 500 MG
500 TABLET ORAL EVERY 4 HOURS PRN
Status: DISCONTINUED | OUTPATIENT
Start: 2025-06-16 | End: 2025-06-19

## 2025-06-16 RX ORDER — ASPIRIN 81 MG/1
81 TABLET, CHEWABLE ORAL DAILY
Status: DISCONTINUED | OUTPATIENT
Start: 2025-06-17 | End: 2025-06-19

## 2025-06-16 RX ORDER — ATORVASTATIN CALCIUM 40 MG/1
40 TABLET, FILM COATED ORAL DAILY
Status: DISCONTINUED | OUTPATIENT
Start: 2025-06-16 | End: 2025-06-19

## 2025-06-16 RX ORDER — ONDANSETRON 2 MG/ML
4 INJECTION INTRAMUSCULAR; INTRAVENOUS EVERY 6 HOURS PRN
Status: DISCONTINUED | OUTPATIENT
Start: 2025-06-16 | End: 2025-06-19

## 2025-06-16 RX ORDER — SENNOSIDES 8.6 MG
17.2 TABLET ORAL NIGHTLY PRN
Status: DISCONTINUED | OUTPATIENT
Start: 2025-06-16 | End: 2025-06-19

## 2025-06-16 NOTE — ED QUICK NOTES
Orders for admission, patient is aware of plan and ready to go upstairs. Any questions, please call ED VIKTORIA Noble at extension [phone].     Patient Covid vaccination status: Fully vaccinated     COVID Test Ordered in ED: None    COVID Suspicion at Admission: N/A    Running Infusions: Medication Infusions[1] None    Mental Status/LOC at time of transport: A/Ox4    Other pertinent information: Lives @ home with caregiver weekly.  CIWA score: N/A   NIH score:  N/A             [1]   
no

## 2025-06-16 NOTE — CONSULTS
Cardiology (consult dictated)    Assessment:  1.  GI bleeding, with severe anemia.  Actual source of bleeding not yet determined.  Patient being considered for further endoscopy including capsule endoscopy.    2.  Murmur of aortic stenosis.  Possible AV malformations associated with aortic stenosis.      3.  Hypertension, controlled.    Plan:  1.  Echocardiogram.    2.  Patient's condition is stable and optimized for endoscopy if needed.      Thank you, we will follow

## 2025-06-16 NOTE — ED INITIAL ASSESSMENT (HPI)
Pt sent to ED from home via EMS for blood transfusion. Blood work completed this am with hgb at 6.5. Arrives A/Ox4, no current complaints.

## 2025-06-16 NOTE — ED PROVIDER NOTES
Patient Seen in: [facility] Emergency Department    History     Chief Complaint   Patient presents with    Abnormal Labs     Stated Complaint: hgb 6.5    HPI    Patient complains of low hgb.  Was admitted for GI bleed 2 weeks ago.  No obvious source.  Outpt labs done hgb 6.5 sent back to ER.  Spoke with GI request admit for pill enteroscopy.    Alleviating factors: none  Exacerbating factors: none    Past Medical History[1]    Past Surgical History[2]         Family History[3]    Short Social Hx on File[4]    Review of Systems    Positive for stated complaint: hgb 6.5  Other systems are as noted in HPI.  Constitutional and vital signs reviewed.      All other systems reviewed and negative except as noted above.    PSFH elements reviewed from today and agreed except as otherwise stated in HPI.    Physical Exam     ED Triage Vitals   BP 06/16/25 1245 147/55   Pulse 06/16/25 1245 71   Resp 06/16/25 1245 18   Temp 06/16/25 1245 98.4 °F (36.9 °C)   Temp src --    SpO2 06/16/25 1245 100 %   O2 Device 06/16/25 1313 None (Room air)       Current:/55   Pulse 71   Temp 98.4 °F (36.9 °C)   Resp 18   Ht 185.4 cm (6' 1\")   Wt 99.6 kg   SpO2 100%   BMI 28.97 kg/m²    PULSE OX nl  GENERAL: appears tired, pale  HEAD: normocephalic, atraumatic,   EYES: PERRLA, EOMI, conj sclera clear  THROAT: mmm, no lesions  NECK: supple, no meningeal signs  LUNGS: no resp distress, cta bilateral  CARDIO: RRR without murmur  GI: abdomen is soft and non tender,   EXTREMITIES: from, 5/5 strength in all 4 ext, b pedal edema  NEURO: alert and oiented *3, 2-12 intact, no focal deficit noted  SKIN: good skin turgor, no  rashes  PSYCH: calm, cooperative,    Differential includes:gi bleed anemia    ED Course     Labs Reviewed   CBC WITH DIFFERENTIAL WITH PLATELET - Abnormal; Notable for the following components:       Result Value    RBC 2.80 (*)     HGB 7.0 (*)     HCT 23.1 (*)     MCH 25.0 (*)     MCHC 30.3 (*)     RDW-SD 50.8 (*)      RDW 17.0 (*)     All other components within normal limits   BASIC METABOLIC PANEL (8) - Abnormal; Notable for the following components:    Glucose 150 (*)     eGFR-Cr 59 (*)     All other components within normal limits   TYPE AND SCREEN    Narrative:     The following orders were created for panel order Type and screen.  Procedure                               Abnormality         Status                     ---------                               -----------         ------                     ABORH (Blood Type)[[id number]]                               In process                 Antibody Screen[[id number]]                                  In process                   Please view results for these tests on the individual orders.   PREPARE RBC   ABORH (BLOOD TYPE)   ANTIBODY SCREEN       MDM       Cardiac Monitor:   Pulse Readings from Last 1 Encounters:   06/16/25 71   , sinus, 71  interpreted by me.    Radiology findings:   No results found.        Medical Decision Making  Problems Addressed:  Anemia, unspecified type: acute illness or injury  Gastrointestinal hemorrhage, unspecified gastrointestinal hemorrhage type: acute illness or injury    Amount and/or Complexity of Data Reviewed  Labs: ordered. Decision-making details documented in ED Course.  Discussion of management or test interpretation with external provider(s): Dr. Vuong in ER evaluating patient.    Risk  Decision regarding hospitalization.        Disposition and Plan     Clinical Impression:  1. Anemia, unspecified type    2. Gastrointestinal hemorrhage, unspecified gastrointestinal hemorrhage type        Disposition:  Admit    Follow-up:  No follow-up provider specified.    Medications Prescribed:  Current Discharge Medication List          Hospital Problems       Present on Admission  Date Reviewed: 6/13/2025          ICD-10-CM Noted POA    * (Principal) Anemia, unspecified type D64.9 6/16/2025 Unknown                     [1]   Past Medical History:    BPH (benign prostatic hyperplasia)    Chronic venous insufficiency of lower extremity    Contact dermatitis    to legs; since 1988    Essential hypertension    High blood pressure    High cholesterol    History of right inguinal hernia repair    age 7     Nasal polyps    ENT Dr Quintana    PAD (peripheral artery disease)    S/P LASIK surgery of both eyes    Toe deformity    since 1988   [2]   Past Surgical History:  Procedure Laterality Date    Colonoscopy N/A 8/10/2021    Procedure: COLONOSCOPY;  Surgeon: Fareed Jackson MD;  Location: [facility] ENDOSCOPY    Colonoscopy N/A 1/9/2025    Procedure: COLONOSCOPY;  Surgeon: Fareed Jackson MD;  Location: [facility] ENDOSCOPY    Excision turbinate,submucous  12/27/2019    Eye surgery      Hernia surgery      Nasal scope,bx/rmv polyp/debrid  12/27/2019    Nasal scopy,explor frontal sinus  12/27/2019    Nasal scopy,remv part ethmoid  12/27/2019    Nasal scopy,remv tiss sphenoid  12/27/2019    Nasal scopy,rmv tiss maxill sinus  12/27/2019   [3]   Family History  Problem Relation Age of Onset    No Known Problems Father     No Known Problems Mother     Diabetes Sister    [4]   Social History  Socioeconomic History    Marital status: Single   Tobacco Use    Smoking status: Never    Smokeless tobacco: Never   Vaping Use    Vaping status: Never Used   Substance and Sexual Activity    Alcohol use: No     Comment: Over 40 yrs    Drug use: Never     Social Drivers of Health     Food Insecurity: No Food Insecurity (5/29/2025)    NCSS - Food Insecurity     Worried About Running Out of Food in the Last Year: No     Ran Out of Food in the Last Year: No   Transportation Needs: No Transportation Needs (5/29/2025)    NCSS - Transportation     Lack of Transportation: No   Housing Stability: Not At Risk (5/29/2025)    NCSS - Housing/Utilities     Has Housing: Yes     Worried About Losing Housing: No     Unable to Get Utilities: No

## 2025-06-16 NOTE — CONSULTS
[facility]    PATIENT'S NAME: EVELYN POWERS JR.   ATTENDING PHYSICIAN: Portia Hansen MD   CONSULTING PHYSICIAN: Eric Marinelli MD   PATIENT ACCOUNT#:   [id number]    LOCATION:  [address] [place] [address]  MEDICAL RECORD #:   [id number]       YOB: 1950  ADMISSION DATE:       06/16/2025      CONSULT DATE:  06/16/2025    REPORT OF CONSULTATION      HISTORY OF PRESENT ILLNESS:  The patient is a 75-year-old patient in his usual state of health until last month when he began to notice dark stools and was admitted to [facility] with severe anemia.  He was transfused, stabilized, and sent home.  Followup CBC was done today revealed hemoglobin of 6.5 mm.  The patient was directed back to the hospital for admission and further testing.  There is no abdominal pain and no chest pain, shortness of breath, dizziness palpitations or syncope.  There was no nausea or vomiting.  His home nurse identified a murmur, and we were asked to consult.  The patient denies any past cardiac history.    PAST MEDICAL HISTORY:  Peptic ulcer disease with a clean based duodenal ulcer on EGD 2 weeks ago.  No active bleeding was identified.  The patient also has a history of hypertension and chronic lower extremity venous stasis.  He has benign prostatic hypertrophy.     PAST SURGICAL HISTORY:  Hernia repair at age 16.    MEDICATIONS:  Home medications:  Aspirin 81 mg daily, amlodipine 10 mg q.a.m., atorvastatin 40 at bedtime, iron, Flonase, pantoprazole 40 mg b.i.d.    ALLERGIES:  No known drug allergies.    SOCIAL HISTORY:  He is single with no children.  He is a retired .  He never smoked cigarettes and drinks rare alcohol.    FAMILY HISTORY:  Negative for premature coronary disease.    REVIEW OF SYSTEMS:  A 10-point review of systems is negative except for the above.      PHYSICAL EXAMINATION:    GENERAL:  Well-developed, well-nourished male, no acute distress.  Alert and oriented x3.  VITAL SIGNS:  Blood pressure  140/80; respirations 16, breathing unlabored; pulse 86 and regular; temperature 99.1; saturation 99% on room air.  HEENT:  Unremarkable.  NECK:  Supple.  Jugular venous pressure normal.  Carotids brisk without bruits.  No thyromegaly or adenopathy.  LUNGS:  Clear bilaterally.  HEART:  S1, S2 normal.  No gallop, rub, or click.  There is a grade 2/6 early to mid peaking systolic ejection murmur.  ABDOMEN:  Soft, nontender.  EXTREMITIES:  Warm and dry.  There are venous stasis changes to the skin bilaterally.  Minimal edema.  No cyanosis or clubbing.  No calf, thigh, or popliteal tenderness.    NEUROLOGIC:  Normal.  SKIN:  Normal.     LABORATORY DATA:  Sodium 138, potassium 4.0, bicarbonate 25, BUN 19, creatinine 1.28.  Ferritin 19.  WBC 7.8; hemoglobin 7, up from 6.5; platelets 390.    EKG shows sinus rhythm with a rate of 71.  There is 1 PVC.  EKG is otherwise unremarkable except for left axis deviation and moderate voltage criteria for LVH.    ASSESSMENT:    1.   Gastrointestinal bleeding resulting in severe anemia.  Source of bleeding not determined.  Patient being considered for further endoscopy including capsule endoscopy.    2.   Murmur of aortic stenosis.  Possible AV malformations associated with aortic stenosis.  3.   Hypertension, controlled.    PLAN:    1.   Echocardiogram to assess murmur.    2.   The patient's condition is stable and optimized for endoscopy if needed.    Thank you for this consultation.    Dictated By Eric Marinelli MD  d: 06/16/2025 17:03:06  t: 06/16/2025 17:08:06  Job [id number]  [facility]/

## 2025-06-16 NOTE — H&P
[facility]    PATIENT'S NAME: EVELYN POWERS JR.   ATTENDING PHYSICIAN: Portia Hansen MD   PATIENT ACCOUNT#:   [id number]    LOCATION:  [address] [place] [address]  MEDICAL RECORD #:   [id number]       YOB: 1950  ADMISSION DATE:       06/16/2025    HISTORY AND PHYSICAL EXAMINATION    CHIEF COMPLAINT:  Persistent anemia, possible intermittent gastrointestinal bleed.    HISTORY OF PRESENT ILLNESS:  The patient is a 75-year-old  male who was hospitalized on May 29 for melena and gastrointestinal bleed with loose bowel movements.  Required 3 blood transfusions at that time.  EGD done by Gastroenterology showed clean-based ulceration in duodenum, 8 mm, but no active bleed or source of blood loss identified.  The patient was discharged home in a stable condition, but he continued to have low hemoglobin.  Today he was found to have a hemoglobin of 6.5.  Instructed to come into the emergency department for evaluation.  Repeat hemoglobin was 7.0.  Chemistry was unremarkable.  GFR was 59.  No BUN and creatinine high ratio.  The patient will be admitted to the hospital for further management and capsule endoscopy per Gastroenterology recommendations.    PAST MEDICAL HISTORY:  Recent hospitalization with gastrointestinal bleed, melena; clean-based duodenal ulcer on EGD but no evidence of active bleed.  Required 3 units of blood transfusion.  History of hypertension, hyperlipidemia, chronic lower extremity venous stasis, benign prostatic hypertrophy.    PAST SURGICAL HISTORY:  Hernia repair, sinus surgery.    MEDICATIONS:  Please see medication reconciliation list.     ALLERGIES:  No known drug allergies.    FAMILY HISTORY:  Father had diabetes mellitus type 2.      SOCIAL HISTORY:  No tobacco, alcohol, or drug use.      REVIEW OF SYSTEMS:  Persistent fatigue.  He had not had a bowel movement for the last week, but he is still passing gas.  He denies any nausea, vomiting, or abdominal pain.  Other  12-point review of systems is negative.        PHYSICAL EXAMINATION:    GENERAL:  Alert and oriented to time, place, and person.  No acute distress.    VITAL SIGNS:  Temperature 98.4, pulse 77, respiratory rate 16, blood pressure 153/74, pulse ox 100% on room air.  HEENT:  Atraumatic.  Oropharynx clear.  Moist mucous membranes.  Ears and nose normal.  Eyes:  Anicteric sclerae.  NECK:  Supple.  No lymphadenopathy.  Trachea midline.    LUNGS:  Clear to auscultation bilaterally.  Normal respiratory effort.    HEART:  Regular rate and rhythm.  S1 and S2 auscultated.  No murmur.  ABDOMEN:  Soft, nondistended.  No tenderness.  Positive bowel sounds.  EXTREMITIES:  Chronic venous stasis.  No erythema.  Trace edema.  There is an abrasion on the right second toe dorsum without cellulitis.  NEUROLOGIC:  Motor and sensory intact.       ASSESSMENT:    1.   Persistent anemia, possible posthemorrhagic, rule out recurrent gastrointestinal bleed.  Current source is unclear.  Recent EGD showed clean-based ulcer at duodenal bulb without active bleeding.  2.   Chronic venous stasis.    3.   Essential hypertension.  4.   Constipation.    PLAN:  The patient will be admitted to general medical floor.  Bowel prep protocol.  Clear liquid diet.  Monitor hemodynamic status.  Continue to trend hemoglobin.  Gastroenterology consult to evaluate the patient for capsule endoscopy.  Further recommendations to follow.      Dictated By Portia Hansen MD  d: 06/16/2025 14:28:18  t: 06/16/2025 14:46:51  Job [id number]  FB/

## 2025-06-16 NOTE — CONSULTS
[facility]   Gastroenterology Consultation Note    Benjamin Rachel Jr.  Patient Status:    Emergency  Date of Admission:         6/16/2025, Hospital day #0  Attending:   Archie Faith MD  PCP:     Cole Yousif MD    Reason for Consultation:  Acute on chronic anemia    History of Present Illness:  Benjamin Rachel Jr. is a a(n) 75 year old male w/ a history of PAD on aspirin, recent admission for melena found to have lopez class II c duodenal bulb ulcer on EGD, who presents with acute on chronic anemia. Abnormal outpatient lab showed hgb 6.5. He denies overt gi bleeding such as melena or hematochezia. He denies abdominal pain, nausea or emesis. Has not had a bowel movement for over 3 days. Last colonoscopy 1/2025 with diverticulosis in sigmoid.    History:  Past Medical History[1]  Past Surgical History[2]  Family History[3]   reports that he has never smoked. He has never used smokeless tobacco. He reports that he does not drink alcohol and does not use drugs.    Allergies:  Allergies[4]    Medications:  Current Hospital Medications[5]  Prescriptions Prior to Admission[6]    Review of Systems:  CONSTITUTIONAL:  negative for fevers, rigors  EYES:  negative for diplopia   RESPIRATORY:  negative for severe shortness of breath  CARDIOVASCULAR:  negative for crushing sub-sternal chest pain  GASTROINTESTINAL:  see HPI  GENITOURINARY:  negative for dysuria or gross hematuria  INTEGUMENT/BREAST:  SKIN:  negative for jaundice   ALLERGIC/IMMUNOLOGIC:  negative for hay fever  ENDOCRINE:  negative for cold intolerance and heat intolerance  MUSCULOSKELETAL:  negative for joint effusion/severe erythema  NEURO: negative for dizziness or loss of conscious or paresthesias  BEHAVIOR/PSYCH:  negative for psychotic behavior    Physical Exam:    Blood pressure 139/51, pulse 70, temperature 98.4 °F (36.9 °C), resp. rate 18, height 6' 1\" (1.854 m), weight 219 lb 9.3 oz (99.6 kg), SpO2 99%. Body mass index is 28.97  kg/m².    General: awake, alert and oriented, no acute distress  HEENT: moist mucus membranes  PULM: no conversational dyspnea  CARDIOVASCULAR: regular rate and rhythm, the extremities are warm and well perfused  GI: soft, non-tender, non-distended, + BS, no rebound/guarding   EXTREMITIES: no edema, moving all extremities  SKIN: no visible rash  NEURO: appropriate and interactive    Laboratory Data:  Lab Results   Component Value Date    WBC 7.8 06/16/2025    HGB 7.0 06/16/2025    HCT 23.1 06/16/2025    .0 06/16/2025    CREATSERUM 1.28 06/16/2025    BUN 19 06/16/2025     06/16/2025    K 4.0 06/16/2025     06/16/2025    CO2 25.0 06/16/2025     06/16/2025    CA 9.2 06/16/2025       Imaging:  No results found.    Assessment & Plan   Benjamin Rachel Jr. is a a(n) 75 year old male w/ a history of PAD on aspirin, recent admission for melena found to have lopez class II c duodenal bulb ulcer on EGD, who presents with acute on chronic anemia. Abnormal outpatient lab showed hgb 6.5, no overt bleeding however constipated. Additionally iron studies show declining ferritin 19.     # Severe TRISTA. Recent EGD with low risk ulcer. No overt gi bleeding. Recent colonoscopy within 6 months. Recurrent anemia requiring transfusion hgb 6.5. At this time, suggest repeat EGD to reassess recent duodenal ulcer followed by video capsule endoscopy for small bowel evaluation. Magnesium citrate tonight for prep. IV iron. Transfuse to goal hgb > 7. PPI.     Clarisa Vuong MD  [facility]  6/16/2025    This note was partially prepared using Dragon Medical voice recognition dictation software. As a result, errors may occur. When identified, these errors have been corrected. While every attempt is made to correct errors during dictation, discrepancies may still exist.          [1]   Past Medical History:   BPH (benign prostatic hyperplasia)    Chronic venous insufficiency of lower extremity    Contact  30-Apr-2021 12:45 dermatitis    to legs; since 1988    Essential hypertension    High blood pressure    High cholesterol    History of right inguinal hernia repair    age 7     Nasal polyps    ENT Dr Mariano COFFMAN (peripheral artery disease)    S/P LASIK surgery of both eyes    Toe deformity    since 1988   [2]   Past Surgical History:  Procedure Laterality Date    Colonoscopy N/A 8/10/2021    Procedure: COLONOSCOPY;  Surgeon: Fareed Jackson MD;  Location: [facility] ENDOSCOPY    Colonoscopy N/A 1/9/2025    Procedure: COLONOSCOPY;  Surgeon: Fareed Jackson MD;  Location: [facility] ENDOSCOPY    Excision turbinate,submucous  12/27/2019    Eye surgery      Hernia surgery      Nasal scope,bx/rmv polyp/debrid  12/27/2019    Nasal scopy,explor frontal sinus  12/27/2019    Nasal scopy,remv part ethmoid  12/27/2019    Nasal scopy,remv tiss sphenoid  12/27/2019    Nasal scopy,rmv tiss maxill sinus  12/27/2019   [3]   Family History  Problem Relation Age of Onset    No Known Problems Father     No Known Problems Mother     Diabetes Sister    [4]   Allergies  Allergen Reactions    Soap RASH and OTHER (SEE COMMENTS)     Tide no dye     Skin reaction, also itching   [5] No current facility-administered medications for this encounter.  [6] (Not in a hospital admission)     29-Apr-2021 13:31 29-Apr-2021 16:01

## 2025-06-17 ENCOUNTER — APPOINTMENT (OUTPATIENT)
Dept: CV DIAGNOSTICS | Facility: HOSPITAL | Age: 75
End: 2025-06-17
Attending: INTERNAL MEDICINE
Payer: MEDICARE

## 2025-06-17 ENCOUNTER — APPOINTMENT (OUTPATIENT)
Dept: CV DIAGNOSTICS | Facility: HOSPITAL | Age: 75
DRG: 812 | End: 2025-06-17
Attending: INTERNAL MEDICINE
Payer: MEDICARE

## 2025-06-17 ENCOUNTER — ANESTHESIA (OUTPATIENT)
Dept: ENDOSCOPY | Facility: HOSPITAL | Age: 75
End: 2025-06-17
Payer: MEDICARE

## 2025-06-17 ENCOUNTER — ANESTHESIA EVENT (OUTPATIENT)
Dept: ENDOSCOPY | Facility: HOSPITAL | Age: 75
End: 2025-06-17
Payer: MEDICARE

## 2025-06-17 PROBLEM — D62 ACUTE BLOOD LOSS ANEMIA: Status: ACTIVE | Noted: 2025-06-17

## 2025-06-17 LAB
ANION GAP SERPL CALC-SCNC: 8 MMOL/L (ref 0–18)
ATRIAL RATE: 71 BPM
BLOOD TYPE BARCODE: 5100
BUN BLD-MCNC: 13 MG/DL (ref 9–23)
BUN/CREAT SERPL: 12.1 (ref 10–20)
CALCIUM BLD-MCNC: 9.1 MG/DL (ref 8.7–10.4)
CHLORIDE SERPL-SCNC: 103 MMOL/L (ref 98–112)
CO2 SERPL-SCNC: 27 MMOL/L (ref 21–32)
CREAT BLD-MCNC: 1.07 MG/DL (ref 0.7–1.3)
DEPRECATED RDW RBC AUTO: 51.1 FL (ref 35.1–46.3)
EGFRCR SERPLBLD CKD-EPI 2021: 73 ML/MIN/1.73M2 (ref 60–?)
ERYTHROCYTE [DISTWIDTH] IN BLOOD BY AUTOMATED COUNT: 16.8 % (ref 11–15)
GLUCOSE BLD-MCNC: 101 MG/DL (ref 70–99)
HCT VFR BLD AUTO: 25.7 % (ref 39–53)
HGB BLD-MCNC: 7.7 G/DL (ref 13–17.5)
MCH RBC QN AUTO: 25 PG (ref 26–34)
MCHC RBC AUTO-ENTMCNC: 30 G/DL (ref 31–37)
MCV RBC AUTO: 83.4 FL (ref 80–100)
OSMOLALITY SERPL CALC.SUM OF ELEC: 286 MOSM/KG (ref 275–295)
P AXIS: 6 DEGREES
P-R INTERVAL: 170 MS
PLATELET # BLD AUTO: 389 10(3)UL (ref 150–450)
POTASSIUM SERPL-SCNC: 4.2 MMOL/L (ref 3.5–5.1)
Q-T INTERVAL: 382 MS
QRS DURATION: 104 MS
QTC CALCULATION (BEZET): 415 MS
R AXIS: -28 DEGREES
RBC # BLD AUTO: 3.08 X10(6)UL (ref 3.8–5.8)
SODIUM SERPL-SCNC: 138 MMOL/L (ref 136–145)
T AXIS: 16 DEGREES
UNIT VOLUME: 350 ML
VENTRICULAR RATE: 71 BPM
WBC # BLD AUTO: 7.7 X10(3) UL (ref 4–11)

## 2025-06-17 PROCEDURE — 93306 TTE W/DOPPLER COMPLETE: CPT | Performed by: INTERNAL MEDICINE

## 2025-06-17 PROCEDURE — 43235 EGD DIAGNOSTIC BRUSH WASH: CPT | Performed by: INTERNAL MEDICINE

## 2025-06-17 PROCEDURE — 99233 SBSQ HOSP IP/OBS HIGH 50: CPT | Performed by: HOSPITALIST

## 2025-06-17 PROCEDURE — 0DJ08ZZ INSPECTION OF UPPER INTESTINAL TRACT, VIA NATURAL OR ARTIFICIAL OPENING ENDOSCOPIC: ICD-10-PCS | Performed by: INTERNAL MEDICINE

## 2025-06-17 PROCEDURE — 91110 GI TRC IMG INTRAL ESOPH-ILE: CPT | Performed by: INTERNAL MEDICINE

## 2025-06-17 PROCEDURE — 0DJ07ZZ INSPECTION OF UPPER INTESTINAL TRACT, VIA NATURAL OR ARTIFICIAL OPENING: ICD-10-PCS | Performed by: INTERNAL MEDICINE

## 2025-06-17 RX ORDER — POLYETHYLENE GLYCOL 3350 17 G/17G
17 POWDER, FOR SOLUTION ORAL DAILY
Status: DISCONTINUED | OUTPATIENT
Start: 2025-06-17 | End: 2025-06-19

## 2025-06-17 RX ORDER — PHENYLEPHRINE HCL 10 MG/ML
VIAL (ML) INJECTION AS NEEDED
Status: DISCONTINUED | OUTPATIENT
Start: 2025-06-17 | End: 2025-06-17 | Stop reason: SURG

## 2025-06-17 RX ORDER — SODIUM CHLORIDE, SODIUM LACTATE, POTASSIUM CHLORIDE, CALCIUM CHLORIDE 600; 310; 30; 20 MG/100ML; MG/100ML; MG/100ML; MG/100ML
INJECTION, SOLUTION INTRAVENOUS CONTINUOUS
Status: DISCONTINUED | OUTPATIENT
Start: 2025-06-17 | End: 2025-06-17

## 2025-06-17 RX ORDER — DOCUSATE SODIUM 100 MG/1
100 CAPSULE, LIQUID FILLED ORAL 2 TIMES DAILY
Status: DISCONTINUED | OUTPATIENT
Start: 2025-06-17 | End: 2025-06-19

## 2025-06-17 RX ORDER — LIDOCAINE HYDROCHLORIDE 10 MG/ML
INJECTION, SOLUTION EPIDURAL; INFILTRATION; INTRACAUDAL; PERINEURAL AS NEEDED
Status: DISCONTINUED | OUTPATIENT
Start: 2025-06-17 | End: 2025-06-17 | Stop reason: SURG

## 2025-06-17 RX ORDER — NALOXONE HYDROCHLORIDE 0.4 MG/ML
0.08 INJECTION, SOLUTION INTRAMUSCULAR; INTRAVENOUS; SUBCUTANEOUS ONCE AS NEEDED
Status: DISCONTINUED | OUTPATIENT
Start: 2025-06-17 | End: 2025-06-17 | Stop reason: HOSPADM

## 2025-06-17 RX ORDER — SENNOSIDES 8.6 MG
8.6 TABLET ORAL 2 TIMES DAILY
Status: DISCONTINUED | OUTPATIENT
Start: 2025-06-17 | End: 2025-06-19

## 2025-06-17 RX ORDER — EPHEDRINE SULFATE 50 MG/ML
INJECTION INTRAVENOUS AS NEEDED
Status: DISCONTINUED | OUTPATIENT
Start: 2025-06-17 | End: 2025-06-17 | Stop reason: SURG

## 2025-06-17 RX ORDER — SODIUM CHLORIDE, SODIUM LACTATE, POTASSIUM CHLORIDE, CALCIUM CHLORIDE 600; 310; 30; 20 MG/100ML; MG/100ML; MG/100ML; MG/100ML
INJECTION, SOLUTION INTRAVENOUS CONTINUOUS PRN
Status: DISCONTINUED | OUTPATIENT
Start: 2025-06-17 | End: 2025-06-17 | Stop reason: SURG

## 2025-06-17 RX ADMIN — SODIUM CHLORIDE, SODIUM LACTATE, POTASSIUM CHLORIDE, CALCIUM CHLORIDE: 600; 310; 30; 20 INJECTION, SOLUTION INTRAVENOUS at 10:18:00

## 2025-06-17 RX ADMIN — PHENYLEPHRINE HCL 200 MCG: 10 MG/ML VIAL (ML) INJECTION at 10:39:00

## 2025-06-17 RX ADMIN — EPHEDRINE SULFATE 5 MG: 50 INJECTION INTRAVENOUS at 10:27:00

## 2025-06-17 RX ADMIN — EPHEDRINE SULFATE 5 MG: 50 INJECTION INTRAVENOUS at 10:42:00

## 2025-06-17 RX ADMIN — PHENYLEPHRINE HCL 100 MCG: 10 MG/ML VIAL (ML) INJECTION at 10:42:00

## 2025-06-17 RX ADMIN — PHENYLEPHRINE HCL 100 MCG: 10 MG/ML VIAL (ML) INJECTION at 10:36:00

## 2025-06-17 RX ADMIN — EPHEDRINE SULFATE 5 MG: 50 INJECTION INTRAVENOUS at 10:24:00

## 2025-06-17 RX ADMIN — EPHEDRINE SULFATE 10 MG: 50 INJECTION INTRAVENOUS at 10:30:00

## 2025-06-17 RX ADMIN — LIDOCAINE HYDROCHLORIDE 100 MG: 10 INJECTION, SOLUTION EPIDURAL; INFILTRATION; INTRACAUDAL; PERINEURAL at 10:21:00

## 2025-06-17 NOTE — ANESTHESIA PREPROCEDURE EVALUATION
Anesthesia PreOp Note    HPI:     Benjamin Rachel Jr. is a 75 year old male who presents for preoperative consultation requested by: Es Del Rio MD    Date of Surgery: 6/17/2025    Procedure(s):  capsule endoscopy  ESOPHAGOGASTRODUODENOSCOPY (EGD)  Indication: anemia    Relevant Problems   No relevant active problems       NPO:  Last Liquid Consumption Date: 06/16/25  Last Liquid Consumption Time: 0000  Last Solid Consumption Date: 06/16/25  Last Solid Consumption Time: 0000  Last Liquid Consumption Date: 06/16/25          History Review:  Patient Active Problem List    Diagnosis Date Noted    Anemia, unspecified type 06/16/2025    GI bleed 06/16/2025    Gastrointestinal hemorrhage, unspecified gastrointestinal hemorrhage type 05/29/2025    Symptomatic anemia 05/29/2025    Hemorrhagic shock (HCC) 05/29/2025    History of colon polyps 01/09/2025    Pulsatile tinnitus of left ear 03/19/2021       Past Medical History[1]    Past Surgical History[2]    Prescriptions Prior to Admission[3]  Current Medications and Prescriptions Ordered in Epic[4]    Allergies[5]    Family History[6]  Social Hx on file[7]    Available pre-op labs reviewed.  Lab Results   Component Value Date    WBC 7.7 06/17/2025    RBC 3.08 (L) 06/17/2025    HGB 7.7 (L) 06/17/2025    HCT 25.7 (L) 06/17/2025    MCV 83.4 06/17/2025    MCH 25.0 (L) 06/17/2025    MCHC 30.0 (L) 06/17/2025    RDW 16.8 (H) 06/17/2025    .0 06/17/2025     Lab Results   Component Value Date     06/17/2025    K 4.2 06/17/2025     06/17/2025    CO2 27.0 06/17/2025    BUN 13 06/17/2025    CREATSERUM 1.07 06/17/2025     (H) 06/17/2025    CA 9.1 06/17/2025     Lab Results   Component Value Date    INR 1.25 (H) 05/29/2025       Vital Signs:  Body mass index is 28.04 kg/m².   height is 1.854 m (6' 1\") and weight is 96.4 kg (212 lb 8 oz). His oral temperature is 98.8 °F (37.1 °C). His blood pressure is 124/58 and his pulse is 60. His respiration is 18 and  oxygen saturation is 98%.   Vitals:    06/16/25 1901 06/16/25 2015 06/17/25 0521 06/17/25 0816   BP: 119/58 130/57 111/63 124/58   Pulse: 70 70 61 60   Resp: 16 16 16 18   Temp: 99.1 °F (37.3 °C) 98.4 °F (36.9 °C) 98.8 °F (37.1 °C) 98.8 °F (37.1 °C)   TempSrc: Oral Oral Oral Oral   SpO2: 97% 97% 97% 98%   Weight:       Height:            Anesthesia Evaluation     Patient summary reviewed and Nursing notes reviewed    Airway   Mallampati: II  Dental      Pulmonary    Cardiovascular   (+) hypertension    ECG reviewed  ROS comment: CVI both legs  Echo 6/1/25 - 1. Left ventricle: The cavity size was normal. Wall thickness was at the      upper limits of normal. Systolic function was normal. The estimated      ejection fraction was 60-65%, by biplane method of disks. No diagnostic      evidence for regional wall motion abnormalities.       Neuro/Psych      GI/Hepatic/Renal    (+) bowel prep    Endo/Other    Abdominal                  Anesthesia Plan:   ASA:  3  Plan:   MAC  Informed Consent Plan and Risks Discussed With:  Patient      I have informed Benjamin DALI Rachel Jr. and/or legal guardian or family member of the nature of the anesthetic plan, benefits, risks including possible dental damage if relevant, major complications, and any alternative forms of anesthetic management.   All of the patient's questions were answered to the best of my ability. The patient desires the anesthetic management as planned.  Maria Del Carmen Mar, EMILEE  6/17/2025 9:46 AM  Present on Admission:  **None**           [1]   Past Medical History:   BPH (benign prostatic hyperplasia)    Chronic venous insufficiency of lower extremity    Contact dermatitis    to legs; since 1988    Essential hypertension    High blood pressure    High cholesterol    History of right inguinal hernia repair    age 7     Nasal polyps    ENT Dr Quintana    PAD (peripheral artery disease)    S/P LASIK surgery of both eyes    Toe deformity    since 1988   [2]   Past Surgical  History:  Procedure Laterality Date    Colonoscopy N/A 8/10/2021    Procedure: COLONOSCOPY;  Surgeon: Fareed Jackson MD;  Location: [facility] ENDOSCOPY    Colonoscopy N/A 1/9/2025    Procedure: COLONOSCOPY;  Surgeon: Fareed Jackson MD;  Location: [facility] ENDOSCOPY    Excision turbinate,submucous  12/27/2019    Eye surgery      Hernia surgery      Nasal scope,bx/rmv polyp/debrid  12/27/2019    Nasal scopy,explor frontal sinus  12/27/2019    Nasal scopy,remv part ethmoid  12/27/2019    Nasal scopy,remv tiss sphenoid  12/27/2019    Nasal scopy,rmv tiss maxill sinus  12/27/2019   [3]   Medications Prior to Admission   Medication Sig Dispense Refill Last Dose/Taking    fluticasone propionate 50 MCG/ACT Nasal Suspension 1 spray by Each Nare route 2 (two) times daily.   6/15/2025 at  3:00 PM    Ferrous Sulfate (IRON) 325 (65 Fe) MG Oral Tab Take 1 tablet by mouth daily. 90 tablet 1 6/16/2025 at  7:00 AM    pantoprazole 40 MG Oral Tab EC Take 1 tablet (40 mg total) by mouth 2 (two) times daily before meals. 120 tablet 0 6/16/2025 at  7:00 AM    ASPIRIN LOW DOSE 81 MG Oral Chew Tab Chew 1 tablet (81 mg total) by mouth in the evening. 1 tablet 0 6/16/2025 at  7:00 AM    atorvastatin 40 MG Oral Tab Take 1 tablet (40 mg total) by mouth daily. 90 tablet 3 6/15/2025 at  7:30 PM    amLODIPine 10 MG Oral Tab Take 1 tablet (10 mg total) by mouth daily. 90 tablet 3 6/16/2025 at  7:00 AM    [START ON 7/31/2025] pantoprazole 40 MG Oral Tab EC Take 1 tablet (40 mg total) by mouth every morning before breakfast. (Patient not taking: Reported on 6/16/2025) 90 tablet 3 Not Taking   [4]   Current Facility-Administered Medications Ordered in Epic   Medication Dose Route Frequency Provider Last Rate Last Admin    sodium ferric gluconate (Ferrlecit) 125 mg in sodium chloride 0.9% 100mL IVPB premix  125 mg Intravenous Daily Clarisa Vuong  mL/hr at 06/17/25 0819 125 mg at 06/17/25 0819    acetaminophen (Tylenol Extra Strength) tab  500 mg  500 mg Oral Q4H PRN Portia Hansen MD        ondansetron (Zofran) 4 MG/2ML injection 4 mg  4 mg Intravenous Q6H PRN Portia Hansen MD        metoclopramide (Reglan) 5 mg/mL injection 5 mg  5 mg Intravenous Q8H PRN Portia Hansen MD        polyethylene glycol (PEG 3350) (Miralax) 17 g oral packet 17 g  17 g Oral Daily PRN Portia Hansen MD        sennosides (Senokot) tab 17.2 mg  17.2 mg Oral Nightly PRN Portia Hansen MD        bisacodyl (Dulcolax) 10 MG rectal suppository 10 mg  10 mg Rectal Daily PRN Portia Hansen MD        fleet enema (Fleet) rectal enema 133 mL  1 enema Rectal Once PRN Portia Hansen MD        pantoprazole (Protonix) 40 mg in sodium chloride 0.9% PF 10 mL IV push  40 mg Intravenous Q12H Clarisa Vuong MD   40 mg at 06/17/25 0522    amLODIPine (Norvasc) tab 10 mg  10 mg Oral Daily Portia Hansen MD   10 mg at 06/17/25 0819    aspirin chewable tab 81 mg  81 mg Oral Daily Portia Hansen MD   81 mg at 06/17/25 0819    atorvastatin (Lipitor) tab 40 mg  40 mg Oral Daily Portia Hansen MD   40 mg at 06/16/25 2019    fluticasone propionate (Flonase) 50 MCG/ACT nasal suspension 1 spray  1 spray Each Nare BID Portia Hansen MD   1 spray at 06/17/25 0922     No current Epic-ordered outpatient medications on file.   [5]   Allergies  Allergen Reactions    Soap RASH and OTHER (SEE COMMENTS)     Tide no dye     Skin reaction, also itching   [6]   Family History  Problem Relation Age of Onset    No Known Problems Father     No Known Problems Mother     Diabetes Sister    [7]   Social History  Socioeconomic History    Marital status: Single   Tobacco Use    Smoking status: Never    Smokeless tobacco: Never   Vaping Use    Vaping status: Never Used   Substance and Sexual Activity    Alcohol use: No     Comment: Over 40 yrs    Drug use: Never

## 2025-06-17 NOTE — OPERATIVE REPORT
ESOPHAGOGASTRODUODENOSCOPY (EGD) REPORT    Benjamin Rachel Jr.     1950 Age 75 year old   PCP Cole Yousif MD Endoscopist Es Del Rio MD     Date of procedure: 25    Procedure: EGD w/ brush/wash and video capsule deployment     Pre-operative diagnosis: iron deficiency anemia, history of duodenal ulcer, blood loss anemia    Post-operative diagnosis: small hiatal hernia, duodenal bulb erythema, healed duodenal ulcer     Medications: MAC    Complications: none    Procedure:  Informed consent was obtained from the patient after the risks of the procedure were discussed, including but not limited to bleeding, perforation, aspiration, infection, or possibility of a missed lesion. After discussions of the risks/benefits and alternatives to this procedure, as well as the planned sedation, the patient was placed in the left lateral decubitus position and begun on continuous blood pressure pulse oximetry and EKG monitoring and this was maintained throughout the procedure. Once an adequate level of sedation was obtained a bite block was placed. Then the lubricated tip of the [id number] diagnostic video upper endoscope was inserted and advanced using direct visualization into the posterior pharynx and ultimately into the esophagus.    Complications: None    Findings:      1. Esophagus: The squamocolumnar junction was noted at 43 cm and appeared unremarkable. The diaphragmatic pinch was noted noted at 44 cm from the incisors. There was a 1 cm hiatal hernia. The esophageal mucosa appeared unremarkable.     2. Stomach: The stomach distended normally. Normal rugal folds were seen. The pylorus was patent. The gastric mucosa appeared unremarkable. Retroflexion revealed a normal fundus and a patulous cardia.     3. Duodenum: The duodenal mucosa appeared normal in the 1st and 2nd portion of the duodenum. There was mild patchy erythema in the duodenal bulb. No ulcer was noted. The descending duodenum appeared  unremarkable.     The video capsule was successfully deployed into the duodenal bulb.     Impression:  1. Duodenal bulb erythema.   2. The previously seen duodenal ulcer has healed.   3. No evidence of active or recent GI bleeding.   4. Successfully deployed video capsule into the duodenum.     Recommend:  Will await VCE completion.  Advance the diet per the VCE protocol. The information is in the patient's chart.     >>>If tissue was sampled/removed and you have not received your pathology results either by phone or letter within 2 weeks, please call our office at [phone].    Specimens: none.     Blood loss: <1 ml

## 2025-06-17 NOTE — PHYSICAL THERAPY NOTE
PT orders received and chart reviewed. Patient off the floor for EGD; will re-attempt as schedule permits.     Jenna Haase, PT, DPT  [facility]  Ext: [phone]

## 2025-06-17 NOTE — PLAN OF CARE
Problem: Patient Centered Care  Goal: Patient preferences are identified and integrated in the patient's plan of care  Description: Interventions:  - What would you like us to know as we care for you? From home  - Provide timely, complete, and accurate information to patient/family  - Incorporate patient and family knowledge, values, beliefs, and cultural backgrounds into the planning and delivery of care  - Encourage patient/family to participate in care and decision-making at the level they choose  - Honor patient and family perspectives and choices  Outcome: Progressing     Problem: GASTROINTESTINAL - ADULT  Goal: Maintains or returns to baseline bowel function  Description: INTERVENTIONS:  - Assess bowel function  - Maintain adequate hydration with IV or PO as ordered and tolerated  - Evaluate effectiveness of GI medications  - Encourage mobilization and activity  - Obtain nutritional consult as needed  - Establish a toileting routine/schedule  - Consider collaborating with pharmacy to review patient's medication profile  Outcome: Progressing     Problem: HEMATOLOGIC - ADULT  Goal: Maintains hematologic stability  Description: INTERVENTIONS  - Assess for signs and symptoms of bleeding or hemorrhage  - Monitor labs and vital signs for trends  - Administer supportive blood products/factors, fluids and medications as ordered and appropriate  - Administer supportive blood products/factors as ordered and appropriate  Outcome: Progressing     Problem: PAIN - ADULT  Goal: Verbalizes/displays adequate comfort level or patient's stated pain goal  Description: INTERVENTIONS:  - Encourage pt to monitor pain and request assistance  - Assess pain using appropriate pain scale  - Administer analgesics based on type and severity of pain and evaluate response  - Implement non-pharmacological measures as appropriate and evaluate response  - Consider cultural and social influences on pain and pain management  - Manage/alleviate  anxiety  - Utilize distraction and/or relaxation techniques  - Monitor for opioid side effects  - Notify MD/LIP if interventions unsuccessful or patient reports new pain  - Anticipate increased pain with activity and pre-medicate as appropriate  Outcome: Progressing     Problem: SAFETY ADULT - FALL  Goal: Free from fall injury  Description: INTERVENTIONS:  - Assess pt frequently for physical needs  - Identify cognitive and physical deficits and behaviors that affect risk of falls.  - [place] fall precautions as indicated by assessment.  - Educate pt/family on patient safety including physical limitations  - Instruct pt to call for assistance with activity based on assessment  - Modify environment to reduce risk of injury  - Provide assistive devices as appropriate  - Consider OT/PT consult to assist with strengthening/mobility  - Encourage toileting schedule  Outcome: Progressing     Problem: DISCHARGE PLANNING  Goal: Discharge to home or other facility with appropriate resources  Description: INTERVENTIONS:  - Identify barriers to discharge w/pt and caregiver  - Include patient/family/discharge partner in discharge planning  - Arrange for needed discharge resources and transportation as appropriate  - Identify discharge learning needs (meds, wound care, etc)  - Arrange for interpreters to assist at discharge as needed  - Consider post-discharge preferences of patient/family/discharge partner  - Complete POLST form as appropriate  - Assess patient's ability to be responsible for managing their own health  - Refer to Case Management Department for coordinating discharge planning if the patient needs post-hospital services based on physician/LIP order or complex needs related to functional status, cognitive ability or social support system  Outcome: Progressing

## 2025-06-17 NOTE — H&P
The above referenced H&P was reviewed by Es Del Rio MD on 6/17/2025, the patient was examined and no significant changes have occurred in the patient's condition since the H&P was performed.  I discussed with the patient and/or legal representative the potential benefits, risks and side effects of this procedure; the likelihood of the patient achieving goals; and potential problems that might occur during recuperation.  I discussed reasonable alternatives to the procedure, including risks, benefits and side effects related to the alternatives and risks related to not receiving this procedure.  We will proceed with procedure as planned.

## 2025-06-17 NOTE — OCCUPATIONAL THERAPY NOTE
OCCUPATIONAL THERAPY EVALUATION - INPATIENT     Room Number: 529/529-A  Evaluation Date: 6/17/2025  Type of Evaluation: Quick Eval   Presenting problem: anemia   Co-morbidities: hypertension, hyperlipidemia, chronic lower extremity venous stasis, benign prostatic hypertrophy    Physician Order: IP Consult to Occupational Therapy  Reason for Therapy: ADL/IADL Dysfunction and Discharge Planning    OCCUPATIONAL THERAPY ASSESSMENT   Patient is a 75 year old male admitted 6/16/2025 for anemia.  Prior to admission, patient's baseline is independent with ADLs and mobility.  Pt reports living with his sister who can assist PRN. Pt has steps into the home but states that he receives EMS assist in order to get in/out of the house. Pt reports that in his house he walks barefoot and outside of the house, he only ambulates with flip flops, however, patient stating he is unable to wear his flip flops due to skin tears/wounds on his toes. Patient is currently functioning below baseline with ADLs, functional transfers, and functional mobility. Pt adamantly refusing OOB activity this session and reporting that he will not participate in any OOB activity while admitted due to \"these floors have concrete underneath them and I cannot walk on concrete.\" Pt states that he does not want therapy to continue following up with him during admission. Pt plans to use bed pan and urinal during admission for toileting needs.     PLAN  Patient has been evaluated and presents with no skilled Occupational Therapy needs  at this time.  Patient will be discharged from Occupational Therapy services. Please re-order if a new functional limitation presents during this admission.         OCCUPATIONAL THERAPY MEDICAL/SOCIAL HISTORY   Problem List  Principal Problem:    Anemia, unspecified type  Active Problems:    Gastrointestinal hemorrhage, unspecified gastrointestinal hemorrhage type    GI bleed    Acute blood loss anemia    HOME SITUATION  Type of  Home: House  Home Layout: One level  Lives With: Other (Comment) (sister)  Toilet and Equipment: Standard height toilet; Grab bar  Shower/Tub and Equipment: Walk-in shower; Other (Comment) (pt completing seated sponge bathing from toilet prior to admission)  Drives: No  Patient Regularly Uses: None    Prior Level of [place]: Prior to admission pt was independent with ADLs and mobility. See assessment section above for further PLOF information.     SUBJECTIVE  \"If I walk on this floor I could have pain that I will have to deal with for the rest of my life.\"     OCCUPATIONAL THERAPY EXAMINATION    OBJECTIVE  Precautions: Bed/chair alarm  Fall Risk: Standard fall risk       PAIN ASSESSMENT  [place]    ACTIVITY TOLERANCE  Pulse: 85  Heart Rate Source: Monitor     BP: 153/74  BP Location: Right arm  BP Method: Automatic  Patient Position: Sitting    O2 SATURATIONS  Oxygen Therapy  SPO2% on Room Air at Rest: 97    COGNITION  Overall Cognitive Status:  WFL - within functional limits    RANGE OF MOTION   Upper extremity ROM is within functional limits     STRENGTH ASSESSMENT  Upper extremity strength is within functional limits     COORDINATION  Gross Motor: WFL   Fine Motor: WFL     ACTIVITIES OF DAILY LIVING ASSESSMENT  AM-PAC ‘6-Clicks’ Inpatient Daily Activity Short Form  How much help from another person does the patient currently need…  -   Putting on and taking off regular lower body clothing?: A Little  -   Bathing (including washing, rinsing, drying)?: A Little  -   Toileting, which includes using toilet, bedpan or urinal? : A Little  -   Putting on and taking off regular upper body clothing?: A Little  -   Taking care of personal grooming such as brushing teeth?: A Little  -   Eating meals?: None    AM-PAC Score:  Score: 19  Approx Degree of Impairment: 42.8%  Standardized Score (AM-PAC Scale): 40.22  CMS Modifier (G-Code): CK    FUNCTIONAL TRANSFER ASSESSMENT   Pt refusing any OOB activity this admission.      BED MOBILITY  Supine to Sit : Stand-by Assist  Sit to Supine (OT): Stand-by Assist    BALANCE ASSESSMENT  Static Sitting: Supervision  Dynamic Sitting: SBA     FUNCTIONAL ADL ASSESSMENT  Grooming Seated: Supervision    Skilled Therapy Provided:   RN cleared pt for participation. Session coordinated with PT. Pt received in bed with no visitors present. Pt agreeable to bed level participation in therapy; refusing OOB activity. Pt required SBA for supine<>sit transitions and maintained static/dynamic sitting EOB for ADLs with SUP-SBA. Vitals monitored and WNL with bed level activity/transitions. Pt declining need for further IP OT services this admission. Pt reports no concerns about return home and declining the need for HHOT at discharge.       EDUCATION PROVIDED  Patient Education : Role of Occupational Therapy; Plan of Care; Functional Transfer Techniques; Fall Prevention; Posture/Positioning; Energy Conservation; Proper Body Mechanics  Patient's Response to Education: Verbalized Understanding; Returned Demonstration    The patient's Approx Degree of Impairment: 42.8% has been calculated based on documentation in the [facility] '6 clicks' Inpatient Daily Activity Short Form.  Research supports that patients with this level of impairment may benefit from HHOT.  Final disposition will be made by interdisciplinary medical team.    Patient End of Session: In bed, Call light within reach, Needs met, RN aware of session/findings, All patient questions and concerns addressed, Hospital anti-slip socks, Alarm set      Patient Evaluation Complexity Level:   Occupational Profile/Medical History MODERATE - Expanded review of history including review of medical or therapy record   Specific performance deficits impacting engagement in ADL/IADL MODERATE  3 - 5 performance deficits   Client Assessment/Performance Deficits MODERATE - Comorbidities and min to mod modifications of tasks    Clinical Decision Making MODERATE - Analysis  of occupational profile, detailed assessments, several treatment options    Overall Complexity MODERATE     OT Session Time: 10 minutes  Self-Care Home Management: 10 minutes

## 2025-06-17 NOTE — ANESTHESIA POSTPROCEDURE EVALUATION
Patient: Benjamin Rachel Jr.    Procedure Summary       Date: 06/17/25 Room / Location: [facility] ENDOSCOPY 05 / [facility]COPY    Anesthesia Start: 1018 Anesthesia Stop: 1049    Procedures:       capsule endoscopy      ESOPHAGOGASTRODUODENOSCOPY (EGD) Diagnosis: (Hiatal hernia, Duodenal erosion)    Surgeons: Es Del Rio MD Anesthesiologist: Maria Del Carmen Mar CRNA    Anesthesia Type: MAC ASA Status: 3            Anesthesia Type: MAC    Vitals Value Taken Time   /46 06/17/25 11:15   Pulse 66 06/17/25 11:18   Resp 13 06/17/25 11:18   SpO2 94 % 06/17/25 11:14   Vitals shown include unfiled device data.    [facility] AN Post Evaluation:   Patient Evaluated in PACU  Patient Participation: complete - patient participated  Level of Consciousness: awake  Pain Score: 0  Pain Management: adequate  Airway Patency:patent  Dental exam unchanged from preop  Yes    Nausea/Vomiting: none  Cardiovascular Status: acceptable  Respiratory Status: acceptable  Postoperative Hydration acceptable      Maria Del Carmen Mar CRNA  6/17/2025 4:09 PM

## 2025-06-17 NOTE — PHYSICAL THERAPY NOTE
PHYSICAL THERAPY EVALUATION - INPATIENT     Room Number: 529/529-A  Evaluation Date: 6/17/2025  Type of Evaluation: Initial   Physician Order: PT Eval and Treat    Presenting Problem: anemia, fatigue, s/p EGD w/ brush/wash and video capsule deployment  Co-Morbidities : Recent hospitalization with gastrointestinal bleed, melena; clean-based duodenal ulcer on EGD but no evidence of active bleed requiring 3 units of blood transfusion. History of hypertension, hyperlipidemia, chronic lower extremity venous stasis, benign prostatic hypertrophy.  Reason for Therapy: Mobility Dysfunction and Discharge Planning    PHYSICAL THERAPY ASSESSMENT   Patient is a 75 year old male admitted 6/16/2025 for anemia, fatigue, s/p EGD w/ brush/wash and video capsule deployment. Prior to admission, patient's baseline is Mod I > Independent with functional mobility and ambulation without assistive device. Patient reports that he receives EMS assistance in order to get in/out of the house due to inability to negotiate stairs. Patient reports that in his house he walks barefoot and outside of the house, he only ambulates with flip flops, however, patient stating that he is currently unable to wear his flip flops due to skin tears/wounds on his toes. Patient is currently functioning below baseline with bed mobility, transfers, gait, standing prolonged periods, and performing household tasks. Patient adamantly declining participation in any meaningful out of bed mobility (transfers/ambulation) due to inability to wear flip flops, stating: \"These floors have concrete underneath them and I cannot walk barefoot on concrete.\" Patient declining need for continued IP PT services, preferring to utilizing bed pan and urinal while admitted.    PLAN  Patient has been evaluated and is declining skilled Physical Therapy needs at this time.  Patient will be discharged from Physical Therapy services. Please re-order if a new functional limitation presents  during this admission.    PT Device Recommendation: None    PHYSICAL THERAPY MEDICAL/SOCIAL HISTORY     Problem List  Principal Problem:    Anemia, unspecified type  Active Problems:    Gastrointestinal hemorrhage, unspecified gastrointestinal hemorrhage type    GI bleed    Acute blood loss anemia    HOME SITUATION  Type of Home: House  Home Layout: One level  Stairs to Enter : 2   Railing: No    Lives With: Other (Comment) (sister)    Drives: No   Patient Regularly Uses: None      SUBJECTIVE  \"Just don't touch my legs.\"    PHYSICAL THERAPY EXAMINATION   OBJECTIVE  Precautions: Bed/chair alarm  Fall Risk: Standard fall risk    PAIN ASSESSMENT  Ratin    COGNITION  Overall Cognitive Status:  WFL - within functional limits    RANGE OF MOTION AND STRENGTH ASSESSMENT  Upper extremity ROM and strength are within functional limits   Lower extremity ROM is within functional limits   Lower extremity strength is within functional limits     BALANCE  Static Sitting: Good  Dynamic Sitting: Fair +  Static Standing: Not tested  Dynamic Standing: Not tested    ACTIVITY TOLERANCE  Pulse: 85  Heart Rate Source: Monitor     BP: 153/74  BP Location: Right arm  BP Method: Automatic  Patient Position: Sitting    O2 WALK  Oxygen Therapy  SPO2% on Room Air at Rest: 97    AM-PAC '6-Clicks' INPATIENT SHORT FORM - BASIC MOBILITY  How much difficulty does the patient currently have...  Patient Difficulty: Turning over in bed (including adjusting bedclothes, sheets and blankets)?: A Little   Patient Difficulty: Sitting down on and standing up from a chair with arms (e.g., wheelchair, bedside commode, etc.): A Little   Patient Difficulty: Moving from lying on back to sitting on the side of the bed?: A Little   How much help from another person does the patient currently need...   Help from Another: Moving to and from a bed to a chair (including a wheelchair)?: A Little   Help from Another: Need to walk in hospital room?: A Little   Help  from Another: Climbing 3-5 steps with a railing?: A Lot     AM-PAC Score:  Raw Score: 17   Approx Degree of Impairment: 50.57%   Standardized Score (AM-PAC Scale): 42.13   CMS Modifier (G-Code): CK    FUNCTIONAL ABILITY STATUS  Functional Mobility/Gait Assessment  Gait Assistance: Not tested  Supine to Sit: stand-by assist. Patient tolerated static sitting approx 5 minutes with intermittent UE support and Supervision in order to maintain static sitting balance.  Sit to Supine: stand-by assist    Exercise/Education Provided:  Bed mobility  Body mechanics  Functional activity tolerated  Posture  Lower therapeutic exercise:  Alternating marching  LAQ  SLR    Skilled Therapy Provided: Patient in bed upon arrival. RN approved activity. Educated patient on POC and benefits of mobilization. Agreeable to participate. Patient reporting no pain. Patient benefits from increased time in order to complete functional mobility tasks. Patient agreeable to sit EOB, however, declining any out of bed mobility while admitted, declining need for IP PT services. Patient reports no functional mobility concerns and declines the need for HHPT upon discharge.     The patient's Approx Degree of Impairment: 50.57% has been calculated based on documentation in the [facility] '6 clicks' Inpatient Basic Mobility Short Form.  Research supports that patients with this level of impairment may benefit from HHPT.  Final disposition will be made by interdisciplinary medical team.    Patient End of Session: In bed, Needs met, Call light within reach, RN aware of session/findings, All patient questions and concerns addressed, Alarm set    Patient was able to achieve the following ...   Patient able to transfer  Patient adamantly declining out of bed mobility    Patient able to ambulate on level surfaces  Patient adamantly declining out of bed mobility     Patient Evaluation Complexity Level:  History Moderate - 1 or 2 personal factors and/or co-morbidities    Examination of body systems Moderate - addressing a total of 3 or more elements   Clinical Presentation  Moderate - Evolving   Clinical Decision Making  Moderate Complexity     Therapeutic Activity:  10 minutes

## 2025-06-17 NOTE — PROGRESS NOTES
[facility]  part of [facility]    Progress Note    Benjamin Rachel Jr. Patient Status:  Inpatient    1950 MRN [id number]   Location [facility] 5SW/SE Attending Claudio Berrios MD   Hosp Day # 1 PCP Cole Yousif MD     Chief Complaint:   Chief Complaint   Patient presents with    Abnormal Labs   Low Hb on labs     Subjective:   Benjamin Rachel Jr. is feeling well. Denies any symptoms no dizziness no CP no SOB. No melena or hematochezia. Last normal BM was several days ago.       Objective:   Objective:    Blood pressure 114/43, pulse 59, temperature 98.8 °F (37.1 °C), temperature source Oral, resp. rate 19, height 6' 1\" (1.854 m), weight 212 lb 8 oz (96.4 kg), SpO2 99%.    Physical Exam:    General: No acute distress.   Respiratory: Clear to auscultation bilaterally. No wheezes. No rhonchi.  Cardiovascular: S1, S2. Regular rate and rhythm. No murmurs, rubs or gallops.   Abdomen: Soft, nontender, nondistended.  Positive bowel sounds. No rebound or guarding.  Neurologic: No focal neurological deficits.   Musculoskeletal: Moves all extremities.  Extremities: No edema.      Results:   Results:    Labs:  Recent Labs   Lab 25  1115 25  1257 25  2017 25  0656   WBC 7.2 7.8  --  7.7   HGB 6.5* 7.0* 7.5* 7.7*   MCV 83.5 82.5  --  83.4   .0 390.0  --  389.0       Recent Labs   Lab 25  1257 25  0656   * 101*   BUN 19 13   CREATSERUM 1.28 1.07   CA 9.2 9.1    138   K 4.0 4.2    103   CO2 25.0 27.0       Estimated Creatinine Clearance: 69.5 mL/min (based on SCr of 1.07 mg/dL).    No results for input(s): \"PTP\", \"INR\" in the last 168 hours.         Culture:  No results found for this visit on 25.    Cardiac  No results for input(s): \"TROP\", \"PBNP\" in the last 168 hours.      Imaging: Imaging data reviewed in Epic.  No results found.    Medications: Scheduled Medications[1]      Assessment and Plan:   Assessment & Plan:         Severe iron def anemia   H/o recent duodenal ulcer   GI on consult.   EGD with video capsule deployment 6/17/25. Prev seen duodenal ulcer resolved.   S/p 1unit PRBC   IV iron   H/h stable. Monitor   PPI BID   HTN   norvasc  Hyperlipidemia   Statin   Constipation   Bowel regimen     Other medical problems  H/o colon polyps   Sigmoid diverticulosis  Venous stasis dermatitis        MDM: High  I personally spent time on chart/note review, review of labs/imaging, discussion with patient, physical exam, discussion with staff, consultants, coordinating care, writing progress note, and discussion of plan of care.        Plan of care discussed with patient or family at bedside.    Claudio Berrios MD  Hospitalist          Supplementary Documentation:     Quality:  DVT Prophylaxis: SCD  CODE status: Full   Dispo: per clinical course            Estimated date of discharge: TBD  Discharge is dependent on: clinical stability  At this point Mr. Rachel is expected to be discharge to: home         **Certification      PHYSICIAN Certification of Need for Inpatient Hospitalization - Initial Certification    Patient will require inpatient services that will reasonably be expected to span two midnight's based on the clinical documentation in H+P.   Based on patients current state of illness, I anticipate that, after discharge, patient will require TBD.               [1]    sodium ferric gluconate  125 mg Intravenous Daily    pantoprazole  40 mg Intravenous Q12H    amLODIPine  10 mg Oral Daily    aspirin  81 mg Oral Daily    atorvastatin  40 mg Oral Daily    fluticasone propionate  1 spray Each Nare BID

## 2025-06-17 NOTE — PROGRESS NOTES
Progress Note  Benjamin Rachel  Patient Status:  Inpatient    1950 MRN [id number]   Location [facility] ENDOSCOPY LAB SUITES Attending Claudio Berrios MD   Hosp Day # 1 PCP Cole Yousif MD     Subjective:  In bed on exam. No acute distress. Denies cp, sob.     Objective:  /43 (BP Location: Right arm)   Pulse 59   Temp 98.8 °F (37.1 °C) (Oral)   Resp 19   Ht 6' 1\" (1.854 m)   Wt 212 lb 8 oz (96.4 kg)   SpO2 99%   BMI 28.04 kg/m²           Intake/Output:    Intake/Output Summary (Last 24 hours) at 2025 1121  Last data filed at 2025 1033  Gross per 24 hour   Intake 958.08 ml   Output 1805 ml   Net -846.92 ml       Last 3 Weights   25 1447 212 lb 8 oz (96.4 kg)   25 1245 219 lb 9.3 oz (99.6 kg)   25 0426 219 lb 9.6 oz (99.6 kg)   25 0418 225 lb 3.2 oz (102.2 kg)   25 0800 230 lb 6.1 oz (104.5 kg)   25 2331 233 lb 7.5 oz (105.9 kg)   25 1714 230 lb (104.3 kg)   25 1451 225 lb (102.1 kg)       Labs:  Recent Labs   Lab 25  1257 25  0656   * 101*   BUN 19 13   CREATSERUM 1.28 1.07   EGFRCR 59* 73   CA 9.2 9.1    138   K 4.0 4.2    103   CO2 25.0 27.0     Recent Labs   Lab 25  1115 25  1257 25  2017 25  0656   RBC 2.67* 2.80*  --  3.08*   HGB 6.5* 7.0* 7.5* 7.7*   HCT 22.3* 23.1*  --  25.7*   MCV 83.5 82.5  --  83.4   MCH 24.3* 25.0*  --  25.0*   MCHC 29.1* 30.3*  --  30.0*   RDW 17.1* 17.0*  --  16.8*   NEPRELIM 4.35 4.88  --   --    WBC 7.2 7.8  --  7.7   .0 390.0  --  389.0         No results for input(s): \"TROP\", \"TROPHS\", \"CK\" in the last 168 hours.    Review of Systems   Cardiovascular: Negative.    Respiratory: Negative.         Physical Exam:    Gen: alert, oriented x 3, NAD  Heent: pupils equal, reactive. Mucous membranes moist.   Neck: no jvd  Cardiac: regular rate and rhythm, normal S1,S2, soft keila  Lungs: CTA  Abd: soft, NT/ND +bs  Ext: no edema. Chronic  stasis changes to BLE  Skin: Warm, dry  Neuro: No focal deficits        Medications:    Scheduled Medications[1]  Medication Infusions[2]        Assessment:  Acute GIB with associated anemia, melena.   Recently admitted for same, transfused and discharged. OP labs demonstrated worsening, thus readmitted.  EGD 6/17-small hiatal hernia, duodenal bulb erythema, healed duodenal ulcer. No evidence of active or recent GIB.   Video capsule in process.   Recent EGD with duodenal ulcer  Colonoscopy 1/2025 with diverticulosis  HTN-controlled on norvasc  Murmur   Echo 6/17/25-LVEF 60-65%, no rwmas. AV Mg 7mmhg with peak velocity 1.9m/s. no sig aortic stenosis or AI.   Severe TRISTA--iv iron.  HL-statin  Chronic venous insufficiency    Plan:  Egd today as above. Capsule study in process.   Echo with preserved LVEF with no aortic stenosis/AI noted.   Hgb stable today. Transfuse for hgb >7.0  No further inpatient cardiac work up. Patient wishes to follow up with Dr. Marinelli after discharge.    Plan of care discussed with patient, RN.    Lily Liu, ИВАН  6/17/2025  11:21 AM           [1]    sodium ferric gluconate  125 mg Intravenous Daily    pantoprazole  40 mg Intravenous Q12H    amLODIPine  10 mg Oral Daily    aspirin  81 mg Oral Daily    atorvastatin  40 mg Oral Daily    fluticasone propionate  1 spray Each Nare BID   [2]    lactated ringers

## 2025-06-17 NOTE — OCCUPATIONAL THERAPY NOTE
OT orders received, chart reviewed. Patient off the floor for EGD at this time; will follow-up for therapy services as able and appropriate.

## 2025-06-17 NOTE — DISCHARGE INSTRUCTIONS
Please resume home health services with [facility] upon your discharge, [phone].

## 2025-06-17 NOTE — CM/SW NOTE
06/17/25 1500   JAI/ABHILASH Referral Data   Referral Source Physician   Reason for Referral Discharge planning;Readmission   Informant EMR;Clinical Staff Member   Readmission Assessment   Factors that patient feels contributed to this readmission Acute/Chronic Clinical Presentation   Pt's living situation prior to admission? Home with family   Pt's level of independence at discharge? No assist/independent (minimal)   Pt. received education on diagnoses at time of discharge? Yes   Did you know who and how to call someone if you felt worse? Yes   Did any new symptoms or issues develop after you were discharged? Yes   ----Post D/C symptoms: Symptoms/issue related to previous hospitalization Yes   Did you understand your discharge instructions? Yes   Were medications taken as indicated on discharge instructions? Yes   Did you have a follow-up appointment scheduled at time of discharge? Yes   ----F/U appt: Did you go to that appointment? Yes   ---- F/U appt: How many days after discharge was follow-up appointment? 0-14 days   Was patient a candidate for: Home Health   ----Home Health Recommendation: Recommended, arranged   Was full assessment completed by ABHILASH/JAI on prior admission? Yes   Was the recommended discharge plan achieved? Yes   Was pt. discharged w/out services? No   Medical Hx   Does patient have an established PCP? Yes  (Dr. Cole Yousif)   Patient Info   Advanced directives? Yes   Patient's Current Mental Status at Time of Assessment Alert;Oriented   Patient's Home Environment House   Number of Levels in Home 1   Number of Stair in Home 2 MARCEL   Patient lives with Sibling   Patient Status Prior to Admission   Independent with ADLs and Mobility No   Pt. requires assistance with Ambulating;Driving   Services in place prior to admission Home Health Care;DME/Supplies at home   Home Health Provider Info [facility]   Type of DME/Supplies Wheeled Walker   Discharge Needs   Anticipated D/C needs Home  health care     SW received MD order for home health evaluation.      Pt admitted for anemia- pt was admitted 5/29-6/2 and discharged home w/[facility].    Pt saw JESSE Somers with GI 6/13.    Per EMR, pt is from a one-level home with 2 MARCEL.  At baseline, pt is independent with no assistive device.  Pt does not drive.    Pt is current with [facility] for RN only.  REJI entered.    Per therapy team, pt not interested in home therapy.      Anticipated therapy need: Home.    SW does not anticipate additional needs at discharge.    PLAN: DC home w/[facility] pending med clear    / to remain available for support and/or discharge planning.     Jessica FREDERICK, LSW  Discharge Planner

## 2025-06-18 ENCOUNTER — APPOINTMENT (OUTPATIENT)
Dept: CT IMAGING | Facility: HOSPITAL | Age: 75
End: 2025-06-18
Attending: INTERNAL MEDICINE
Payer: MEDICARE

## 2025-06-18 ENCOUNTER — APPOINTMENT (OUTPATIENT)
Dept: CT IMAGING | Facility: HOSPITAL | Age: 75
DRG: 812 | End: 2025-06-18
Attending: INTERNAL MEDICINE
Payer: MEDICARE

## 2025-06-18 LAB
ANION GAP SERPL CALC-SCNC: 7 MMOL/L (ref 0–18)
BUN BLD-MCNC: 15 MG/DL (ref 9–23)
BUN/CREAT SERPL: 13.2 (ref 10–20)
CALCIUM BLD-MCNC: 8.9 MG/DL (ref 8.7–10.4)
CHLORIDE SERPL-SCNC: 103 MMOL/L (ref 98–112)
CO2 SERPL-SCNC: 27 MMOL/L (ref 21–32)
CREAT BLD-MCNC: 1.14 MG/DL (ref 0.7–1.3)
DEPRECATED RDW RBC AUTO: 52.3 FL (ref 35.1–46.3)
EGFRCR SERPLBLD CKD-EPI 2021: 68 ML/MIN/1.73M2 (ref 60–?)
ERYTHROCYTE [DISTWIDTH] IN BLOOD BY AUTOMATED COUNT: 17.2 % (ref 11–15)
GLUCOSE BLD-MCNC: 100 MG/DL (ref 70–99)
HCT VFR BLD AUTO: 26.2 % (ref 39–53)
HGB BLD-MCNC: 7.8 G/DL (ref 13–17.5)
MCH RBC QN AUTO: 25 PG (ref 26–34)
MCHC RBC AUTO-ENTMCNC: 29.8 G/DL (ref 31–37)
MCV RBC AUTO: 84 FL (ref 80–100)
OSMOLALITY SERPL CALC.SUM OF ELEC: 285 MOSM/KG (ref 275–295)
PLATELET # BLD AUTO: 378 10(3)UL (ref 150–450)
POTASSIUM SERPL-SCNC: 3.9 MMOL/L (ref 3.5–5.1)
RBC # BLD AUTO: 3.12 X10(6)UL (ref 3.8–5.8)
SODIUM SERPL-SCNC: 137 MMOL/L (ref 136–145)
WBC # BLD AUTO: 7.4 X10(3) UL (ref 4–11)

## 2025-06-18 PROCEDURE — 99233 SBSQ HOSP IP/OBS HIGH 50: CPT | Performed by: INTERNAL MEDICINE

## 2025-06-18 PROCEDURE — 99233 SBSQ HOSP IP/OBS HIGH 50: CPT | Performed by: HOSPITALIST

## 2025-06-18 PROCEDURE — 74177 CT ABD & PELVIS W/CONTRAST: CPT | Performed by: INTERNAL MEDICINE

## 2025-06-18 NOTE — PLAN OF CARE
Problem: Patient Centered Care  Goal: Patient preferences are identified and integrated in the patient's plan of care  Description: Interventions:  - What would you like us to know as we care for you? From home  - Provide timely, complete, and accurate information to patient/family  - Incorporate patient and family knowledge, values, beliefs, and cultural backgrounds into the planning and delivery of care  - Encourage patient/family to participate in care and decision-making at the level they choose  - Honor patient and family perspectives and choices  Outcome: Progressing     Problem: PAIN - ADULT  Goal: Verbalizes/displays adequate comfort level or patient's stated pain goal  Description: INTERVENTIONS:  - Encourage pt to monitor pain and request assistance  - Assess pain using appropriate pain scale  - Administer analgesics based on type and severity of pain and evaluate response  - Implement non-pharmacological measures as appropriate and evaluate response  - Consider cultural and social influences on pain and pain management  - Manage/alleviate anxiety  - Utilize distraction and/or relaxation techniques  - Monitor for opioid side effects  - Notify MD/LIP if interventions unsuccessful or patient reports new pain  - Anticipate increased pain with activity and pre-medicate as appropriate  Outcome: Progressing     Problem: SAFETY ADULT - FALL  Goal: Free from fall injury  Description: INTERVENTIONS:  - Assess pt frequently for physical needs  - Identify cognitive and physical deficits and behaviors that affect risk of falls.  - [place] fall precautions as indicated by assessment.  - Educate pt/family on patient safety including physical limitations  - Instruct pt to call for assistance with activity based on assessment  - Modify environment to reduce risk of injury  - Provide assistive devices as appropriate  - Consider OT/PT consult to assist with strengthening/mobility  - Encourage toileting  schedule  Outcome: Progressing     Problem: DISCHARGE PLANNING  Goal: Discharge to home or other facility with appropriate resources  Description: INTERVENTIONS:  - Identify barriers to discharge w/pt and caregiver  - Include patient/family/discharge partner in discharge planning  - Arrange for needed discharge resources and transportation as appropriate  - Identify discharge learning needs (meds, wound care, etc)  - Arrange for interpreters to assist at discharge as needed  - Consider post-discharge preferences of patient/family/discharge partner  - Complete POLST form as appropriate  - Assess patient's ability to be responsible for managing their own health  - Refer to Case Management Department for coordinating discharge planning if the patient needs post-hospital services based on physician/LIP order or complex needs related to functional status, cognitive ability or social support system  Outcome: Progressing     Problem: GASTROINTESTINAL - ADULT  Goal: Maintains or returns to baseline bowel function  Description: INTERVENTIONS:  - Assess bowel function  - Maintain adequate hydration with IV or PO as ordered and tolerated  - Evaluate effectiveness of GI medications  - Encourage mobilization and activity  - Obtain nutritional consult as needed  - Establish a toileting routine/schedule  - Consider collaborating with pharmacy to review patient's medication profile  Outcome: Progressing     Problem: HEMATOLOGIC - ADULT  Goal: Maintains hematologic stability  Description: INTERVENTIONS  - Assess for signs and symptoms of bleeding or hemorrhage  - Monitor labs and vital signs for trends  - Administer supportive blood products/factors, fluids and medications as ordered and appropriate  - Administer supportive blood products/factors as ordered and appropriate  Outcome: Progressing

## 2025-06-18 NOTE — PLAN OF CARE
Problem: Patient Centered Care  Goal: Patient preferences are identified and integrated in the patient's plan of care  Description: Interventions:  - What would you like us to know as we care for you? From home  - Provide timely, complete, and accurate information to patient/family  - Incorporate patient and family knowledge, values, beliefs, and cultural backgrounds into the planning and delivery of care  - Encourage patient/family to participate in care and decision-making at the level they choose  - Honor patient and family perspectives and choices  Outcome: Progressing     Problem: Patient/Family Goals  Goal: Patient/Family Long Term Goal  Description: Patient's Long Term Goal:     Interventions:  -   - See additional Care Plan goals for specific interventions  Outcome: Progressing  Goal: Patient/Family Short Term Goal  Description: Patient's Short Term Goal:     Interventions:   -   - See additional Care Plan goals for specific interventions  Outcome: Progressing     Problem: PAIN - ADULT  Goal: Verbalizes/displays adequate comfort level or patient's stated pain goal  Description: INTERVENTIONS:  - Encourage pt to monitor pain and request assistance  - Assess pain using appropriate pain scale  - Administer analgesics based on type and severity of pain and evaluate response  - Implement non-pharmacological measures as appropriate and evaluate response  - Consider cultural and social influences on pain and pain management  - Manage/alleviate anxiety  - Utilize distraction and/or relaxation techniques  - Monitor for opioid side effects  - Notify MD/LIP if interventions unsuccessful or patient reports new pain  - Anticipate increased pain with activity and pre-medicate as appropriate  Outcome: Progressing     Problem: SAFETY ADULT - FALL  Goal: Free from fall injury  Description: INTERVENTIONS:  - Assess pt frequently for physical needs  - Identify cognitive and physical deficits and behaviors that affect risk of  falls.  - [place] fall precautions as indicated by assessment.  - Educate pt/family on patient safety including physical limitations  - Instruct pt to call for assistance with activity based on assessment  - Modify environment to reduce risk of injury  - Provide assistive devices as appropriate  - Consider OT/PT consult to assist with strengthening/mobility  - Encourage toileting schedule  Outcome: Progressing     Problem: DISCHARGE PLANNING  Goal: Discharge to home or other facility with appropriate resources  Description: INTERVENTIONS:  - Identify barriers to discharge w/pt and caregiver  - Include patient/family/discharge partner in discharge planning  - Arrange for needed discharge resources and transportation as appropriate  - Identify discharge learning needs (meds, wound care, etc)  - Arrange for interpreters to assist at discharge as needed  - Consider post-discharge preferences of patient/family/discharge partner  - Complete POLST form as appropriate  - Assess patient's ability to be responsible for managing their own health  - Refer to Case Management Department for coordinating discharge planning if the patient needs post-hospital services based on physician/LIP order or complex needs related to functional status, cognitive ability or social support system  Outcome: Progressing     Problem: GASTROINTESTINAL - ADULT  Goal: Maintains or returns to baseline bowel function  Description: INTERVENTIONS:  - Assess bowel function  - Maintain adequate hydration with IV or PO as ordered and tolerated  - Evaluate effectiveness of GI medications  - Encourage mobilization and activity  - Obtain nutritional consult as needed  - Establish a toileting routine/schedule  - Consider collaborating with pharmacy to review patient's medication profile  Outcome: Progressing     Problem: HEMATOLOGIC - ADULT  Goal: Maintains hematologic stability  Description: INTERVENTIONS  - Assess for signs and symptoms of bleeding or  hemorrhage  - Monitor labs and vital signs for trends  - Administer supportive blood products/factors, fluids and medications as ordered and appropriate  - Administer supportive blood products/factors as ordered and appropriate  Outcome: Progressing

## 2025-06-18 NOTE — PROGRESS NOTES
[facility]     Gastroenterology Progress Note    Benjamin Rachel Jr. Patient Status:  Inpatient    1950 MRN [id number]   Location [facility] 5SW/SE Attending Mery Monsalve MD   Hosp Day # 2 PCP Cole Yousif MD       Subjective:   He feels good today, no abd pain, n/v. No bowel movement.     Objective:   Blood pressure 130/56, pulse 63, temperature 98.5 °F (36.9 °C), temperature source Oral, resp. rate 16, height 6' 1\" (1.854 m), weight 212 lb 8 oz (96.4 kg), SpO2 99%. Body mass index is 28.04 kg/m².    General: awake, alert and oriented, no acute distress  HEENT: moist mucus membranes  PULM: no conversational dyspnea  CARDIOVASCULAR: regular rate and rhythm, the extremities are warm and well perfused  GI: soft, non-tender, non-distended, + BS, no rebound/guarding   EXTREMITIES: no edema, moving all extremities  SKIN: no visible rash  NEURO: appropriate and interactive    Assessment and Plan:   Benjamin Rachel Jr. is a a(n) 75 year old male w/ a history of PAD on aspirin, recent admission for melena found to have lopez class II c duodenal bulb ulcer on EGD, who presents with acute on chronic anemia. Abnormal outpatient lab showed hgb 6.5, no overt bleeding however constipated. Additionally iron studies show declining ferritin 19.     S/p 1 unit pRBC during admission to hgb 7.8 today. No overt gi bleeding.      # Severe TRISTA. Recent EGD with low risk ulcer. No overt gi bleeding. Repeat EGD this admission shows resolution of prior duodenal ulcer, no active bleeding. S/p VCE placement. VCE shows nonspecific focal wall thickening/erythema in presumed mid-distal duodenum vs artifact. Will obtain CT enterography to further assess if anything definitive. If CTE unrevealing, no objections to discharge today. Received IV ferrlecit during admission. Recommend close monitoring of hgb as outpatient. Follow-up gi clinic upon discharge.     Clarisa Vuong MD  [facility]  Gastroenterology      Results:     Lab Results   Component Value Date    WBC 7.4 06/18/2025    HGB 7.8 (L) 06/18/2025    HCT 26.2 (L) 06/18/2025    .0 06/18/2025    CREATSERUM 1.14 06/18/2025    BUN 15 06/18/2025     06/18/2025    K 3.9 06/18/2025     06/18/2025    CO2 27.0 06/18/2025     (H) 06/18/2025    CA 8.9 06/18/2025    ALB 3.1 (L) 05/31/2025    ALKPHO 41 (L) 05/31/2025    BILT 0.6 05/31/2025    TP 5.0 (L) 05/31/2025    AST 29 05/31/2025    ALT 15 05/31/2025    PTT 25.5 05/29/2025    INR 1.25 (H) 05/29/2025    TSH 1.929 11/09/2024    PSA 4.95 (H) 11/09/2024    MG 1.9 05/31/2025    PHOS 2.7 05/31/2025       No results found.  EKG 12 Lead  Result Date: 6/17/2025  Sinus rhythm with occasional Premature ventricular complexes Moderate voltage criteria for LVH, may be normal variant ( R in aVL , O'Brien product ) Nonspecific ST abnormality Abnormal ECG When compared with ECG of 29-MAY-2025 17:14, Premature ventricular complexes are now Present Confirmed by Warren Gray ([id number]) on 6/17/2025 11:52:14 AM

## 2025-06-18 NOTE — DISCHARGE SUMMARY
[facility]  part of [facility]    Discharge Summary    Benjamin Rachel Jr. Patient Status:  Inpatient    1950 MRN [id number]   Location [facility] 5SW/SE Attending Mery Monsalve MD   Hosp Day # 2 PCP Cole Yousif MD     Date of Admission: 2025   Date of Discharge: 2025    Admitting Diagnosis: Gastrointestinal hemorrhage, unspecified gastrointestinal hemorrhage type [K92.2]  Anemia, unspecified type [D64.9]    Disposition: Home    Hospital Discharge Diagnoses: Acute blood loss anemia    Lace+ Score: 62  59-90 High Risk  29-58 Medium Risk  0-28   Low Risk.    [facility] Follow-Up Recommendation:  LACE > 58: High Risk of readmission after discharge from the hospital.        Discharge Diagnosis: .Principal Problem:    Anemia, unspecified type  Active Problems:    Gastrointestinal hemorrhage, unspecified gastrointestinal hemorrhage type    GI bleed    Acute blood loss anemia      Hospital Course:   Reason for Admission:   Per Dr. Hansen  The patient is a 75-year-old  male who was hospitalized on May 29 for melena and gastrointestinal bleed with loose bowel movements.  Required 3 blood transfusions at that time.  EGD done by Gastroenterology showed clean-based ulceration in duodenum, 8 mm, but no active bleed or source of blood loss identified.  The patient was discharged home in a stable condition, but he continued to have low hemoglobin.  Today he was found to have a hemoglobin of 6.5.  Instructed to come into the emergency department for evaluation.  Repeat hemoglobin was 7.0.  Chemistry was unremarkable.  GFR was 59.  No BUN and creatinine high ratio.  The patient will be admitted to the hospital for further management and capsule endoscopy per Gastroenterology recommendations.       Discharge Physical Exam:   Physical Exam:    General: No acute distress.   Respiratory: Clear to auscultation bilaterally. No wheezes. No rhonchi.  Cardiovascular: S1, S2. Regular  rate and rhythm. No murmurs, rubs or gallops.   Abdomen: Soft, nontender, nondistended.  Positive bowel sounds. No rebound or guarding.  Neurologic: No focal neurological deficits.   Musculoskeletal: Moves all extremities.    Hospital Course:   Severe iron def anemia   H/o recent duodenal ulcer   GI on consult.   EGD with video capsule deployment 6/17/25. Prev seen duodenal ulcer resolved.   S/p 1unit PRBC   IV iron   H/h stable. Monitor   PPI BID   Video capsule endoscopy shows bowel wall thickening 6/18  CT entertography done 6/18 showing resolution of duodenal ulcer  Stable for discharge.   HTN   norvasc  Hyperlipidemia   Statin   Constipation   Bowel regimen      Other medical problems  H/o colon polyps   Sigmoid diverticulosis  Venous stasis dermatitis          MDM: High      Complications: none    Consultants         Provider   Role Specialty     Eric Marinelli MD      Consulting Physician Interventional, Cardiology     Ma, Clarisa Garduno MD      Consulting Physician GASTROENTEROLOGY          Surgical Procedures       Case IDs Date Procedure Surgeon Location Status    [id number] 6/17/25 capsule endoscopy Es Del Rio MD [facility] ENDOSCOPY Comp              Discharge Plan:   Discharge Condition: Stable    Current Discharge Medication List        Home Meds - Unchanged    Details   fluticasone propionate 50 MCG/ACT Nasal Suspension 1 spray by Each Nare route 2 (two) times daily.      Ferrous Sulfate (IRON) 325 (65 Fe) MG Oral Tab Take 1 tablet by mouth daily.      !! pantoprazole 40 MG Oral Tab EC Take 1 tablet (40 mg total) by mouth 2 (two) times daily before meals.      ASPIRIN LOW DOSE 81 MG Oral Chew Tab Chew 1 tablet (81 mg total) by mouth in the evening.      atorvastatin 40 MG Oral Tab Take 1 tablet (40 mg total) by mouth daily.      amLODIPine 10 MG Oral Tab Take 1 tablet (10 mg total) by mouth daily.      !! pantoprazole 40 MG Oral Tab EC Take 1 tablet (40 mg total) by mouth every morning before breakfast.        !! - Potential duplicate medications found. Please discuss with provider.              Discharge Diet: As tolerated    Discharge Activity: As tolerated       Discharge Medications        CONTINUE taking these medications        Instructions Prescription details   amLODIPine 10 MG Tabs  Commonly known as: Norvasc      Take 1 tablet (10 mg total) by mouth daily.   Quantity: 90 tablet  Refills: 3     Aspirin Low Dose 81 MG Chew  Generic drug: aspirin      Chew 1 tablet (81 mg total) by mouth in the evening.   Quantity: 1 tablet  Refills: 0     atorvastatin 40 MG Tabs  Commonly known as: Lipitor      Take 1 tablet (40 mg total) by mouth daily.   Quantity: 90 tablet  Refills: 3     fluticasone propionate 50 MCG/ACT Susp  Commonly known as: Flonase      1 spray by Each Nare route 2 (two) times daily.   Refills: 0     Iron 325 (65 Fe) MG Tabs      Take 1 tablet by mouth daily.   Stop taking on: August 12, 2025  Quantity: 90 tablet  Refills: 1     pantoprazole 40 MG Tbec  Commonly known as: Protonix      Take 1 tablet (40 mg total) by mouth 2 (two) times daily before meals.   Stop taking on: August 1, 2025  Quantity: 120 tablet  Refills: 0     pantoprazole 40 MG Tbec  Commonly known as: Protonix  Start taking on: July 31, 2025      Take 1 tablet (40 mg total) by mouth every morning before breakfast.   Quantity: 90 tablet  Refills: 3              Follow up:      Follow-up Information       Eric Marinelli MD Follow up in 1 month(s).    Specialties: Interventional, Cardiology, CARDIOLOGY  Why: Office will call you for follow up appt.  Contact information:  [address]  [place] [id number] [phone]                             Follow up Labs and imaging:         Other Discharge Instructions:         Please resume home health services with [facility] upon your discharge, [phone].         Time spent:  > 30 minutes    GIO THOMAS MD  6/18/2025

## 2025-06-18 NOTE — CM/SW NOTE
06/18/25 1100   Discharge disposition   Expected discharge disposition Home-Health   Post Acute Care Provider [facility]   Discharge transportation Private car     ABHILASH confirmed with RN Rachelle who stated pt is medically ready for discharge today.  DC order placed.    HH updates attached via [facility] to [facility] . Liaison Jaz from [facility]  made aware of discharge through secure chat.    ABHILASH confirmed that [facility]  contact information added to AVS.    PLAN: DC home with [facility]     Jessica FREDERICK, WILLW  Discharge Planner

## 2025-06-19 ENCOUNTER — TELEPHONE (OUTPATIENT)
Facility: CLINIC | Age: 75
End: 2025-06-19

## 2025-06-19 VITALS
HEIGHT: 73 IN | WEIGHT: 212.5 LBS | BODY MASS INDEX: 28.16 KG/M2 | RESPIRATION RATE: 18 BRPM | SYSTOLIC BLOOD PRESSURE: 141 MMHG | HEART RATE: 74 BPM | OXYGEN SATURATION: 99 % | DIASTOLIC BLOOD PRESSURE: 64 MMHG | TEMPERATURE: 99 F

## 2025-06-19 PROCEDURE — 99239 HOSP IP/OBS DSCHRG MGMT >30: CPT | Performed by: HOSPITALIST

## 2025-06-19 PROCEDURE — 99233 SBSQ HOSP IP/OBS HIGH 50: CPT | Performed by: INTERNAL MEDICINE

## 2025-06-19 NOTE — PLAN OF CARE
Problem: Patient Centered Care  Goal: Patient preferences are identified and integrated in the patient's plan of care  Description: Interventions:  - What would you like us to know as we care for you? From home  - Provide timely, complete, and accurate information to patient/family  - Incorporate patient and family knowledge, values, beliefs, and cultural backgrounds into the planning and delivery of care  - Encourage patient/family to participate in care and decision-making at the level they choose  - Honor patient and family perspectives and choices  Outcome: Progressing       Problem: PAIN - ADULT  Goal: Verbalizes/displays adequate comfort level or patient's stated pain goal  Description: INTERVENTIONS:  - Encourage pt to monitor pain and request assistance  - Assess pain using appropriate pain scale  - Administer analgesics based on type and severity of pain and evaluate response  - Implement non-pharmacological measures as appropriate and evaluate response  - Consider cultural and social influences on pain and pain management  - Manage/alleviate anxiety  - Utilize distraction and/or relaxation techniques  - Monitor for opioid side effects  - Notify MD/LIP if interventions unsuccessful or patient reports new pain  - Anticipate increased pain with activity and pre-medicate as appropriate  Outcome: Progressing     Problem: SAFETY ADULT - FALL  Goal: Free from fall injury  Description: INTERVENTIONS:  - Assess pt frequently for physical needs  - Identify cognitive and physical deficits and behaviors that affect risk of falls.  - [place] fall precautions as indicated by assessment.  - Educate pt/family on patient safety including physical limitations  - Instruct pt to call for assistance with activity based on assessment  - Modify environment to reduce risk of injury  - Provide assistive devices as appropriate  - Consider OT/PT consult to assist with strengthening/mobility  - Encourage toileting  schedule  Outcome: Progressing     Problem: DISCHARGE PLANNING  Goal: Discharge to home or other facility with appropriate resources  Description: INTERVENTIONS:  - Identify barriers to discharge w/pt and caregiver  - Include patient/family/discharge partner in discharge planning  - Arrange for needed discharge resources and transportation as appropriate  - Identify discharge learning needs (meds, wound care, etc)  - Arrange for interpreters to assist at discharge as needed  - Consider post-discharge preferences of patient/family/discharge partner  - Complete POLST form as appropriate  - Assess patient's ability to be responsible for managing their own health  - Refer to Case Management Department for coordinating discharge planning if the patient needs post-hospital services based on physician/LIP order or complex needs related to functional status, cognitive ability or social support system  Outcome: Progressing     Problem: GASTROINTESTINAL - ADULT  Goal: Maintains or returns to baseline bowel function  Description: INTERVENTIONS:  - Assess bowel function  - Maintain adequate hydration with IV or PO as ordered and tolerated  - Evaluate effectiveness of GI medications  - Encourage mobilization and activity  - Obtain nutritional consult as needed  - Establish a toileting routine/schedule  - Consider collaborating with pharmacy to review patient's medication profile  Outcome: Progressing     Problem: HEMATOLOGIC - ADULT  Goal: Maintains hematologic stability  Description: INTERVENTIONS  - Assess for signs and symptoms of bleeding or hemorrhage  - Monitor labs and vital signs for trends  - Administer supportive blood products/factors, fluids and medications as ordered and appropriate  - Administer supportive blood products/factors as ordered and appropriate  Outcome: Progressing

## 2025-06-19 NOTE — PROGRESS NOTES
[facility]  part of [facility]    Progress Note    Benjamin Rachel Jr. Patient Status:  Inpatient    1950 MRN [id number]   Location [facility] 5SW/SE Attending Claudio Berrios MD   Hosp Day # 3 PCP Cole Yousif MD     Chief Complaint:   Chief Complaint   Patient presents with    Abnormal Labs   Low Hb on labs     Subjective:   Benjamin Rachel Jr. is feeling well. Anxious about going home. Wants to go home tomorrow due to the bad weather.        Objective:   Objective:    Blood pressure 127/56, pulse 59, temperature 98.6 °F (37 °C), temperature source Oral, resp. rate 16, height 6' 1\" (1.854 m), weight 212 lb 8 oz (96.4 kg), SpO2 99%.    Physical Exam:    General: No acute distress.   Respiratory: Clear to auscultation bilaterally. No wheezes. No rhonchi.  Cardiovascular: S1, S2. Regular rate and rhythm. No murmurs, rubs or gallops.   Abdomen: Soft, nontender, nondistended.  Positive bowel sounds. No rebound or guarding.  Neurologic: No focal neurological deficits.   Musculoskeletal: Moves all extremities.  Extremities: No edema.      Results:   Results:    Labs:  Recent Labs   Lab 25  1115 25  1257 25  0656 25  0557   WBC 7.2 7.8  --  7.7 7.4   HGB 6.5* 7.0* 7.5* 7.7* 7.8*   MCV 83.5 82.5  --  83.4 84.0   .0 390.0  --  389.0 378.0       Recent Labs   Lab 25  1257 25  0656 25  0556   * 101* 100*   BUN 19 13 15   CREATSERUM 1.28 1.07 1.14   CA 9.2 9.1 8.9    138 137   K 4.0 4.2 3.9    103 103   CO2 25.0 27.0 27.0       Estimated Creatinine Clearance: 65.2 mL/min (based on SCr of 1.14 mg/dL).    No results for input(s): \"PTP\", \"INR\" in the last 168 hours.         Culture:  No results found for this visit on 25.    Cardiac  No results for input(s): \"TROP\", \"PBNP\" in the last 168 hours.      Imaging: Imaging data reviewed in Epic.  CT ENTEROGRAPHY ABD/PEL W CONTRAST (CPT=74177)  Result Date:  6/18/2025  CONCLUSION:  1. No conclusive evidence of active inflammatory bowel disease.  2. A video capsule is suggested within the colon, at the level of the splenic flexure.  3. Nonspecific intraluminal hyperdensity in the proximal colon is indeterminate, but could reflect hemorrhage in the appropriate clinical setting.  4. Uncomplicated distal colonic diverticulosis.   5. Prostatomegaly with chronic outlet obstruction.  6. Lesser incidental findings as above.    Dictated by (CST): Sebastian White MD on 6/18/2025 at 6:55 PM     Finalized by (CST): Sebastian White MD on 6/18/2025 at 7:04 PM            Medications: Scheduled Medications[1]      Assessment and Plan:   Assessment & Plan:        Severe iron def anemia   H/o recent duodenal ulcer   GI on consult.   EGD with video capsule deployment 6/17/25. Prev seen duodenal ulcer resolved.   S/p 1unit PRBC   IV iron   H/h stable. Monitor   PPI BID   CT enterography ordered due to findings on video capsule- will follow up on results.   Home most likely tomorrow  HTN   norvasc  Hyperlipidemia   Statin   Constipation   Bowel regimen     Other medical problems  H/o colon polyps   Sigmoid diverticulosis  Venous stasis dermatitis        MDM: High  I personally spent time on chart/note review, review of labs/imaging, discussion with patient, physical exam, discussion with staff, consultants, coordinating care, writing progress note, and discussion of plan of care.        Plan of care discussed with patient or family at bedside.      Mery Monsalve MD  Hospitalist          Supplementary Documentation:     Quality:  DVT Prophylaxis: SCD  CODE status: Full   Dispo: per clinical course            Estimated date of discharge: TBD  Discharge is dependent on: clinical stability  At this point Mr. Rachel is expected to be discharge to: home         **Certification      PHYSICIAN Certification of Need for Inpatient Hospitalization - Initial Certification    Patient will require  inpatient services that will reasonably be expected to span two midnight's based on the clinical documentation in H+P.   Based on patients current state of illness, I anticipate that, after discharge, patient will require TBD.               [1]    polyethylene glycol (PEG 3350)  17 g Oral Daily    sennosides  8.6 mg Oral BID    docusate sodium  100 mg Oral BID    pantoprazole  40 mg Intravenous Q12H    amLODIPine  10 mg Oral Daily    aspirin  81 mg Oral Daily    atorvastatin  40 mg Oral Daily    fluticasone propionate  1 spray Each Nare BID

## 2025-06-19 NOTE — PLAN OF CARE
Patient cleared for discharge. Peripheral IV removed. Went over discharge paperwork with patient. Patient verbalized understanding. Belongings taken. Transported down by PCT.     Problem: Patient Centered Care  Goal: Patient preferences are identified and integrated in the patient's plan of care  Description: Interventions:  - Provide timely, complete, and accurate information to patient/family  - Incorporate patient and family knowledge, values, beliefs, and cultural backgrounds into the planning and delivery of care  - Encourage patient/family to participate in care and decision-making at the level they choose  - Honor patient and family perspectives and choices  Outcome: Adequate for Discharge     Problem: PAIN - ADULT  Goal: Verbalizes/displays adequate comfort level or patient's stated pain goal  Description: INTERVENTIONS:  - Encourage pt to monitor pain and request assistance  - Assess pain using appropriate pain scale  - Administer analgesics based on type and severity of pain and evaluate response  - Implement non-pharmacological measures as appropriate and evaluate response  - Consider cultural and social influences on pain and pain management  - Manage/alleviate anxiety  - Utilize distraction and/or relaxation techniques  - Monitor for opioid side effects  - Notify MD/LIP if interventions unsuccessful or patient reports new pain  - Anticipate increased pain with activity and pre-medicate as appropriate  Outcome: Adequate for Discharge     Problem: SAFETY ADULT - FALL  Goal: Free from fall injury  Description: INTERVENTIONS:  - Assess pt frequently for physical needs  - Identify cognitive and physical deficits and behaviors that affect risk of falls.  - [place] fall precautions as indicated by assessment.  - Educate pt/family on patient safety including physical limitations  - Instruct pt to call for assistance with activity based on assessment  - Modify environment to reduce risk of injury  - Provide  assistive devices as appropriate  - Consider OT/PT consult to assist with strengthening/mobility  - Encourage toileting schedule  Outcome: Adequate for Discharge     Problem: DISCHARGE PLANNING  Goal: Discharge to home or other facility with appropriate resources  Description: INTERVENTIONS:  - Identify barriers to discharge w/pt and caregiver  - Include patient/family/discharge partner in discharge planning  - Arrange for needed discharge resources and transportation as appropriate  - Identify discharge learning needs (meds, wound care, etc)  - Arrange for interpreters to assist at discharge as needed  - Consider post-discharge preferences of patient/family/discharge partner  - Complete POLST form as appropriate  - Assess patient's ability to be responsible for managing their own health  - Refer to Case Management Department for coordinating discharge planning if the patient needs post-hospital services based on physician/LIP order or complex needs related to functional status, cognitive ability or social support system  Outcome: Adequate for Discharge     Problem: GASTROINTESTINAL - ADULT  Goal: Maintains or returns to baseline bowel function  Description: INTERVENTIONS:  - Assess bowel function  - Maintain adequate hydration with IV or PO as ordered and tolerated  - Evaluate effectiveness of GI medications  - Encourage mobilization and activity  - Obtain nutritional consult as needed  - Establish a toileting routine/schedule  - Consider collaborating with pharmacy to review patient's medication profile  Outcome: Adequate for Discharge     Problem: HEMATOLOGIC - ADULT  Goal: Maintains hematologic stability  Description: INTERVENTIONS  - Assess for signs and symptoms of bleeding or hemorrhage  - Monitor labs and vital signs for trends  - Administer supportive blood products/factors, fluids and medications as ordered and appropriate  - Administer supportive blood products/factors as ordered and appropriate  Outcome:  Adequate for Discharge

## 2025-06-19 NOTE — OPERATIVE REPORT
VIDEO CAPSULE ENDOSCOPY REPORT    Benjamin Rachel Jr.     1950 Age 75 year old   PCP Cole Yousif MD Gastroenterologist Clarisa Vuong MD     Date of procedure: 2025    Pre-operative diagnosis: anemia    Post-operative diagnosis: see impression    Procedure:  Informed consent was obtained from the patient after the risks of the procedure were discussed, including but not limited to aspiration, missed lesion, and capsule retention. After discussions of the risks/benefits and alternatives to this procedure, the patient signed consent. A standard 8 hour video capsule was administered after the patient prepped with a bowel laxative.     Findings:   1. Stomach entry (1st gastric image): 00:18:24  2. Small bowel (1st duodenal image): 00:18:49  3. Colon (1st cecal image): 04:59:02  4. Red spots/bleeding: punctate red spot at 00:36:58, non-bleeding and superficial. Otherwise bilious effluent throughout small bowel images. No fresh or old blood seen.   5. Other: at 00:30, there was a focal nonspecific fullness of fold, only captured for about 6 seconds. No obvious change in mucosal pattern.    Impression:  1. No active bleeding seen. Punctate red spot in proximal small bowel. Nonspecific wall thickening at focal area in duodenum vs artifactual    Recommendations:  1. Obtain CTE to further assess above duodenal area

## 2025-06-19 NOTE — PROGRESS NOTES
[facility]     Gastroenterology Progress Note    Benjamin Rachel Jr. Patient Status:  Inpatient    1950 MRN [id number]   Location [facility] 5SW/SE Attending Mery Monsalve MD   Hosp Day # 3 PCP Cole Yousif MD       Subjective:   He feels good today, no abd pain, n/v. No bowel movement.     Objective:   Blood pressure 141/64, pulse 74, temperature 98.6 °F (37 °C), temperature source Oral, resp. rate 18, height 6' 1\" (1.854 m), weight 212 lb 8 oz (96.4 kg), SpO2 99%. Body mass index is 28.04 kg/m².    General: awake, alert and oriented, no acute distress  HEENT: moist mucus membranes  PULM: no conversational dyspnea  CARDIOVASCULAR: regular rate and rhythm, the extremities are warm and well perfused  GI: soft, non-tender, non-distended, + BS, no rebound/guarding   EXTREMITIES: no edema, moving all extremities  SKIN: no visible rash  NEURO: appropriate and interactive    Assessment and Plan:   Benjamin Rachel Jr. is a a(n) 75 year old male w/ a history of PAD on aspirin, recent admission for melena found to have lopez class II c duodenal bulb ulcer on EGD, who presents with acute on chronic anemia. Abnormal outpatient lab showed hgb 6.5, no overt bleeding however constipated. Additionally iron studies show declining ferritin 19.     S/p 1 unit pRBC during admission to hgb 7.8 today. No overt gi bleeding.      # Severe TRISTA. Recent EGD with low risk ulcer. No overt gi bleeding. Repeat EGD this admission shows resolution of prior duodenal ulcer, no active bleeding. S/p VCE placement. VCE shows nonspecific focal wall thickening/erythema in presumed mid-distal duodenum vs artifact. CTE without correlate.  Received IV ferrlecit during admission. Recommend close monitoring of hgb as outpatient. Follow-up gi clinic upon discharge.     Clarisa Vuong MD  [facility]      Results:     Lab Results   Component Value Date    WBC 7.4 2025    HGB 7.8 (L) 2025     HCT 26.2 (L) 06/18/2025    .0 06/18/2025    CREATSERUM 1.14 06/18/2025    BUN 15 06/18/2025     06/18/2025    K 3.9 06/18/2025     06/18/2025    CO2 27.0 06/18/2025     (H) 06/18/2025    CA 8.9 06/18/2025    ALB 3.1 (L) 05/31/2025    ALKPHO 41 (L) 05/31/2025    BILT 0.6 05/31/2025    TP 5.0 (L) 05/31/2025    AST 29 05/31/2025    ALT 15 05/31/2025    PTT 25.5 05/29/2025    INR 1.25 (H) 05/29/2025    TSH 1.929 11/09/2024    PSA 4.95 (H) 11/09/2024    MG 1.9 05/31/2025    PHOS 2.7 05/31/2025       CT ENTEROGRAPHY ABD/PEL W CONTRAST (CPT=74177)  Result Date: 6/18/2025  CONCLUSION:  1. No conclusive evidence of active inflammatory bowel disease.  2. A video capsule is suggested within the colon, at the level of the splenic flexure.  3. Nonspecific intraluminal hyperdensity in the proximal colon is indeterminate, but could reflect hemorrhage in the appropriate clinical setting.  4. Uncomplicated distal colonic diverticulosis.   5. Prostatomegaly with chronic outlet obstruction.  6. Lesser incidental findings as above.    Dictated by (CST): Sebastian White MD on 6/18/2025 at 6:55 PM     Finalized by (CST): Sebastian White MD on 6/18/2025 at 7:04 PM

## 2025-06-20 ENCOUNTER — TELEPHONE (OUTPATIENT)
Facility: CLINIC | Age: 75
End: 2025-06-20

## 2025-06-20 ENCOUNTER — TELEPHONE (OUTPATIENT)
Dept: INTERNAL MEDICINE CLINIC | Facility: CLINIC | Age: 75
End: 2025-06-20

## 2025-06-20 ENCOUNTER — PATIENT OUTREACH (OUTPATIENT)
Dept: CASE MANAGEMENT | Age: 75
End: 2025-06-20

## 2025-06-20 ENCOUNTER — PATIENT OUTREACH (OUTPATIENT)
Age: 75
End: 2025-06-20

## 2025-06-20 NOTE — TELEPHONE ENCOUNTER
Kaitlin-    I spoke to the patient    Patient states that he is still not able to come into the office for a follow up. He says his \"foot is still cut\"    He is requesting a virtual visit be scheduled    Please advise    Thank you

## 2025-06-20 NOTE — PROGRESS NOTES
Transitional Care Management   Discharge Date: 25  Contact Date: 2025    Assessment:  (Insert appropriate Smartphrase below after completing flowsheet)  TCM Initial Assessment    General:  Assessment completed with: Patient  Patient Subjective: The patient states he is okay. States he wants to follow up with his pcp.  Chief Complaint: Gastrointestinal hemorrhage, unspecified gastrointestinal hemorrhage type   Anemia, unspecified type  GI bleed  Acute blood loss anemia  Verify patient name and  with patient/ caregiver: Yes    Hospital Stay/Discharge:  Tell me what you understand of why you were in the hospital or emergency department: Bleeding ulcer  Prior to leaving the hospital were your Discharge Instructions reviewed with you?: Yes  Did you receive a copy of your written Discharge Instructions?: Yes  What questions do you have about your Discharge Instructions?: None  Do you feel better or worse since you left the hospital or emergency department?: Better    Follow - Up Appointment:  Do you have a follow-up appointment?: No  Are there any barriers to getting to your follow-up appointment?: No    Home Health/DME:  Prior to leaving the hospital was Home Health (HH) arranged for you?: Yes  Has HH been out or set up a visit to see you?: Been out     Prior to leaving the hospital or emergency department was Durable Medical Equipment (DME), medical supplies, or infusions arranged for you?: No  Are DME/medical supply/infusions needs identified by staff during this assessment?: No     Medications/Diet:       Were you given a different diet per your Discharge Instructions?: No     Questions/Concerns:  Do you have any questions or concerns that have not been discussed?: No       Follow-up Appointments:  Your appointments       Date & Time Appointment Department (Center)    2025 1:00 PM CDT Exam - New Patient with Swathi Montalvo MD [facility], [address], [place]  ([facility] [place])        Nov 17, 2025 2:30 PM CST Medicare Annual Well Visit with Cole Yousif MD [facility], [address], [place] ([facility])              [facility], [address], [facility]  [address]  [place] [address]  [phone] [facility], [address], [place] [facility] [place]  [address]  [place] [address]  [phone]            Transitional Care Clinic  Was TCC Ordered: No  Was TCC Scheduled: No   - If yes: []  Advised patient to bring all medications and blood glucose meter/supplies if applicable.    Primary Care Provider (If no TCC appointment)  Does patient already have a PCP appointment scheduled? No  Care Manager Attempted to schedule PCP office TCM appointment with patient   -If no appointment scheduled: PCP schedule is blocked. The patient declined TCC visit or APRN visit. TE routed to pcp office.    Specialist  Does the patient have any other follow-up appointment(s) that need to be scheduled? Yes   -If yes: Care Manager reviewed upcoming specialist appointments with patient: Yes   -Does the patient need assistance scheduling appointment(s): No      Book By Date: 7/4/25

## 2025-06-20 NOTE — TELEPHONE ENCOUNTER
Spoke to patient for TCM today.  Patient does not have an appointment scheduled at this time.  He would like a virtual visit. He states it's hard for him to ambulate. TCM appointment recommended by 7/3/25 as patient is a High risk for readmission.  Please advise.    BOOK BY DATE (last date for TCM): 7/3/25    Hospital Discharge Diagnoses: Acute blood loss anemia     Lace+ Score: 62  59-90 High Risk  29-58 Medium Risk  0-28   Low Risk.     TCM Follow-Up Recommendation:  LACE > 58: High Risk of readmission after discharge from the hospital.       Clinical staff:  Please follow-up with patient and try to get them to schedule as patient would greatly benefit from TCM appointment.  Thank you!

## 2025-06-20 NOTE — TELEPHONE ENCOUNTER
Kaitlin    CBC drawn on 6/16/25 received via fax.    I put it on your desk for review.    It has a critical hemoglobin, but patient had a scope and a few more CBCs done since then which are in Epic.    Thank you

## 2025-06-23 NOTE — TELEPHONE ENCOUNTER
Flora from [facility] patient got back from the hospital on Thursday and wondering if patient needs a follow up appointment please call  [phone]

## 2025-06-23 NOTE — TELEPHONE ENCOUNTER
Spoke with patient  States he is doing really well   He can only wear flip/flops and due to cut on his toe, he cannot wear them & not able to come in   Asking if virtual/phone visit can be scheduled

## 2025-06-23 NOTE — TELEPHONE ENCOUNTER
Received form signed by JESSE HERNANDEZ    Faxed to [facility] [phone] F ([phone]).    Fax confirmation received at 8:19 am.

## 2025-06-23 NOTE — TELEPHONE ENCOUNTER
Ok for phone visit.     Ok to put him on for 7/7. I am in the hospital but can do a phone visit.     Kaitlin Aceves PA-C

## 2025-06-24 NOTE — TELEPHONE ENCOUNTER
Attempted to call Flora from [facility] back    No answer, left message to call back

## 2025-06-30 ENCOUNTER — VIRTUAL PHONE E/M (OUTPATIENT)
Dept: INTERNAL MEDICINE CLINIC | Facility: CLINIC | Age: 75
End: 2025-06-30

## 2025-06-30 DIAGNOSIS — K26.4 GASTROINTESTINAL HEMORRHAGE ASSOCIATED WITH DUODENAL ULCER: Primary | ICD-10-CM

## 2025-06-30 DIAGNOSIS — D50.9 IRON DEFICIENCY ANEMIA, UNSPECIFIED IRON DEFICIENCY ANEMIA TYPE: ICD-10-CM

## 2025-06-30 DIAGNOSIS — I73.9 PVD (PERIPHERAL VASCULAR DISEASE): ICD-10-CM

## 2025-06-30 NOTE — PROGRESS NOTES
Virtual Telephone Check-In    Benjamin Rachel Jr. verbally consents to a Virtual/Telephone Check-In visit on 06/30/25.  Patient has been referred to the [facility] website at [email or web address] to review the yearly Consent to Treat document.    Patient understands and accepts financial responsibility for any deductible, co-insurance and/or co-pays associated with this service.    Duration of the service: 30 minutes    76 y/o M with PVD on ASA 81 mg po every day was admitted to [facility] 5/29-6/2/25 for acute GI bleed; +melena; initial HGb 7.0; EGD by GI Dr Vuong on 5/30/25 showed Small lopez class II c duodenal bulb ulcer along with small erosions; transfused 3 units PRBC, and discharged on 6/2/25; was re-admitted 6/16-6/19/25 when routine Hgb 6.5; repeat EGD6/17/25 by GI DR Del Rio showed duodenal bulb erythema.  The previously seen duodenal ulcer has healed. No evidence of active or recent GI bleeding.  VCE showed no active bleeding seen; he was given IV iron; Hgb 7.8 on 6/19/25; no constipation; no diarrhea; no melena; no hematochezia        Summary of topics discussed:     GI bleed due to duodenal ulcer  -stop ASA for now  -pantoprazole 40 mg po BID x 8 weeks, then daily thereafter    TRISTA  Last Hgb 7.8 on 6/19/25  -start ferrous sulfate 325 mg po BID  -check CBC on 7/2/25    Health Maintenance  Pneumovax given in June 2020; flu vax in Oct 2021 elsewhere; s/p COVID Pfizer vax x 2     Constipation  -tried Miralax 17 gm po daily, though only had one BM;   -stopped Miralax  -on lactulose 30 mL po BID prn; has had BM; does not use regularly  -if no benefit with lactulose, would consider Linzess, or change amlodipine to alternate anti-hypertensive     History of Acute sinusitis  -s/p cefdinir 300 mg po BID #20     History of Chemical conjunctivitis  On 9/1/22; Left eye irrigated with sterile water  -s/p Tobradex one drop q 4 hours OS     Chronic venous insufficiency  No Rx for now; was seen by PV surgery Dr Allison at [facility]      hypercholesterolemia  LDL 82 in Nov 2024 on atorvastatin 40 mg po at bedtime as ordered by PV surgery Dr Allison at [facility]     severe Tinea pedis  with hyperkeratosis to right heel; two large 1.5 cm skin cracks noted--> sealed with Dermabond in Nov 2021;     Colon polyps  screening colonoscopy 8/10/21 by GI Dr Jackson for colon cancer screening; next colonoscopy in 3 years, or Aug 2024; scheduled Jan 2025     Tinnitus, left ear  Began after COVID vaccine 3/15/21; had hearing tested by ENT Dr Quintana and 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; tinnitus has been lessening, though not entirely reoslved     Contact dermatitis with +xerosis  Pt avoiding contact with Tide detergent; no pruritus, so would avoid steroids initially  -improved with Eucerin cream ATAA BID, and clotrimazole 1% cream ATAA BID  -has irritation to urethral meatus intermittently since June 2019; saw MARTI ALEXANDER at [place]; improved with avoidance of Dial soap  -pt prefers Cerave cream over Eucerin     HTN  SBP 100s on amlodipine 10 mg po qD; CPM; last EKG in Feb 2019 showed NSR without ischemic abnormality;     Nasal polyposis  taking Flonase NS 1 sp EN BID  S/p Medtronic assisted navigational sinus surgery, Bilateral transnasal endoscopic: total ethmoidectomies, sphenoidotomies, frontal sinusotomies, maxillary antrostomies with tissue removal, nasal polypectomies  and bilateral inferior turbinate outfracture with submucosal resection, placement of intranasal propel implants on 12/27/19 per ENT Dr Quintana  -now sees ENT Dr Lind; s/p Bilateral endoscopic maxillary antrostomy, endoscopic total ethmoidectomy, endoscopic nasal polypectomy on 11/21/23  -on montelukast 10 mg po at bedtime; wheezing has lessened     Toe deformity  F/U podiatrist Dr Velazquez at [place]     Varicose veins  Noted to BLE; no ulcerations; had venous doppler study for venous insufficiency on 1/24/19 at [place]; saw PV surgery 11/26/19 and saw APN at [place]; was advised for LE  compression stockings; saw PV surgery Dr Abby Spears at [place] in Feb 2020; saw PV Dr Gregg Allison at [facility] in 2022; has a prescription for 20-30 mmHg knee high compression stockings     Mild PVD  By KINDRA in Sept 2024, 0.55 LLE to toe, 0.68 to RLE to toe; has known left PT occlusion with mildly decreased perfusion noted bilaterally; was on  ASA 81 mg po every day; sees JESSE Lr at [facility]  -due to recent bleeding ulcer, will hold ASA for now as asymptomatic from PVD     Elevated PSA   PSA 4.95 in Nov 2024; PSA 5.15 in Oct 2021; PSA 3.38 in Sept 2020;  MRI prostate in Feb 2022 shows PI-RADS 4; had prostate biopsy showed PIN-high grade with adjacent area suspicious for carcinoma; PSA 5.26 in Oct 2022; PSA 6.02 in Nov 2023; has been seeing  Dr Morgan Lundy at [facility]; plans to see Dr Lundy for repeat MRI and prostate biopsy     Hypertriglyceridemia   in Nov 2024; advise low fat diet        MWE 11/6/23        Spent 30 minutes obtaining history, evaluating patient, discussing treatment options, diet, exercise, review of available labs and radiology reports, and completing documentation.           Cole Yousif MD

## 2025-07-01 NOTE — TELEPHONE ENCOUNTER
I spoke to the patient    I informed patient that Kaitlin is able to do a virtual phone visit with him on 7/7/2025, patient agreeable    Patient states that he is available to do a virtual phone at 9 am    Patient scheduled for virtual phone on 7/7/2025 at 9 am    Patient has no further questions at this time    Future Appts 7/1/2025 - 9/29/2025        Status Date Visit Type Length Department Provider     [facility] 7/7/2025  9:00 AM [facility] VIRTUAL PHONE VISIT 30 min [facility] [place] PA-C

## 2025-07-02 ENCOUNTER — LAB REQUISITION (OUTPATIENT)
Dept: LAB | Facility: HOSPITAL | Age: 75
End: 2025-07-02
Payer: MEDICARE

## 2025-07-02 DIAGNOSIS — D50.9 IRON DEFICIENCY ANEMIA, UNSPECIFIED: ICD-10-CM

## 2025-07-02 DIAGNOSIS — B49 UNSPECIFIED MYCOSIS: ICD-10-CM

## 2025-07-02 LAB
BASOPHILS # BLD AUTO: 0.08 X10(3) UL (ref 0–0.2)
BASOPHILS NFR BLD AUTO: 0.9 %
DEPRECATED RDW RBC AUTO: 53.6 FL (ref 35.1–46.3)
EOSINOPHIL # BLD AUTO: 0.59 X10(3) UL (ref 0–0.7)
EOSINOPHIL NFR BLD AUTO: 6.9 %
ERYTHROCYTE [DISTWIDTH] IN BLOOD BY AUTOMATED COUNT: 18.1 % (ref 11–15)
HCT VFR BLD AUTO: 29.5 % (ref 39–53)
HGB BLD-MCNC: 8.9 G/DL (ref 13–17.5)
IMM GRANULOCYTES # BLD AUTO: 0.02 X10(3) UL (ref 0–1)
IMM GRANULOCYTES NFR BLD: 0.2 %
LYMPHOCYTES # BLD AUTO: 1.79 X10(3) UL (ref 1–4)
LYMPHOCYTES NFR BLD AUTO: 20.9 %
MCH RBC QN AUTO: 24.4 PG (ref 26–34)
MCHC RBC AUTO-ENTMCNC: 30.2 G/DL (ref 31–37)
MCV RBC AUTO: 80.8 FL (ref 80–100)
MONOCYTES # BLD AUTO: 0.62 X10(3) UL (ref 0.1–1)
MONOCYTES NFR BLD AUTO: 7.3 %
NEUTROPHILS # BLD AUTO: 5.45 X10 (3) UL (ref 1.5–7.7)
NEUTROPHILS # BLD AUTO: 5.45 X10(3) UL (ref 1.5–7.7)
NEUTROPHILS NFR BLD AUTO: 63.8 %
PLATELET # BLD AUTO: 314 10(3)UL (ref 150–450)
RBC # BLD AUTO: 3.65 X10(6)UL (ref 3.8–5.8)
WBC # BLD AUTO: 8.6 X10(3) UL (ref 4–11)

## 2025-07-02 PROCEDURE — 85025 COMPLETE CBC W/AUTO DIFF WBC: CPT | Performed by: INTERNAL MEDICINE

## 2025-07-07 ENCOUNTER — TELEPHONE (OUTPATIENT)
Facility: CLINIC | Age: 75
End: 2025-07-07

## 2025-07-07 ENCOUNTER — VIRTUAL PHONE E/M (OUTPATIENT)
Facility: CLINIC | Age: 75
End: 2025-07-07

## 2025-07-07 DIAGNOSIS — D64.9 SYMPTOMATIC ANEMIA: Primary | ICD-10-CM

## 2025-07-07 DIAGNOSIS — K26.9 DUODENAL ULCER: ICD-10-CM

## 2025-07-07 NOTE — TELEPHONE ENCOUNTER
Flora/MAUREEN called to speak to RN about verbal orders for labs.  Please call.  OK to leave detailed message.

## 2025-07-07 NOTE — PROGRESS NOTES
[facility] - Gastroenterology                                                                                                               Reason for consult:   Chief Complaint   Patient presents with    Follow - Up       Requesting physician or provider: Cole Yousif MD    >>>This is a Telemedicine with live, interactive video and audio. Patient understands and accepts financial responsibility for any deductible, co-insurance and/or co-pays associated with this service.      HPI:   Benjamin Rachel Jr. is a 75 year old year-old male with history of BMI 30.40, BPH, Chronic venous insufficiency, HTN, HLD, PAD who presents for telephone visit for hospital follow up.     Patient sent back to ED for worsening anemia. Underwent VCE without cause noted for anemia. Completed CTE within normal limits.     Now on iron pill twice a day. Struggling with constipation. Has been taking lactulose to assist with BM.     No abdominal pain, nausea and vomiting  Appetite has been good, denies bloating  No CP, SOB, dizziness and light headedness     Pertinent Family Hx:  + FHCC, maternal grandfather (dx 60s)?  - No family hx of esophageal, gastric cancer  - No family history of IBD     Endoscopy Hx:  -VCE 6/17/2025  Impression:  1. No active bleeding seen. Punctate red spot in proximal small bowel. Nonspecific wall thickening at focal area in duodenum vs artifactual     Recommendations:  1. Obtain CTE to further assess above duodenal area    - EGD 5/30/2025 EGD  Impression:   1. Small lopez class II c duodenal bulb ulcer along with small erosions     Recommend:  1. Follow-up pathology to exclude h pylori  2. Continue ppi bid  3. Ok to resume diet  Final Diagnosis:      Stomach; biopsy:  Fragments of gastric mucosa demonstrating focal features compatible with reactive/chemical gastropathy including mild chronic gastritis with scattered eosinophils, mild foveolar hyperplasia, focal  smooth muscle hyperplasia and mild edema.  No evidence of intestinal metaplasia, epithelial dysplasia, or malignancy identified.   No evidence of bacterial organisms consistent with Helicobacter species seen on Giemsa stain (with appropriate control reactivity).         - Colonoscopy  1/2025 with Dr. Jackson for screening:               Impression:  1.  Uncomplicated sigmoid colon diverticulosis  2.  Otherwise normal colonoscopy to the terminal ileum     - Colonoscopy 8/2021:               Impression:  1.  Diminutive/small colon polyps  2.  Sigmoid colon diverticulosis, currently uncomplicated     Social Hx:  - No tobacco use  - No ETOH  - Denies cannabis/illicit drug use  - PRN NSAIDs  - Lives with: sister  - Occupation: Retired     Wt Readings from Last 6 Encounters:   06/16/25 212 lb 8 oz (96.4 kg)   06/02/25 219 lb 9.6 oz (99.6 kg)   01/03/25 225 lb (102.1 kg)   11/13/24 227 lb (103 kg)   11/06/23 218 lb (98.9 kg)   08/22/23 213 lb (96.6 kg)        History, Medications, Allergies, ROS:      Past Medical History[1]   Past Surgical History[2]   Family Hx: Family History[3]   Social History: Short Social Hx on File[4]     Medications (Active prior to today's visit):  Current Medications[5]    Allergies:  Allergies[6]    ROS:   CONSTITUTIONAL: negative for fevers, chills, sweats and weight loss  EYES Negative for red eyes, yellow eyes, changes in vision  HEENT: Negative for dysphagia and hoarseness  RESPIRATORY: Negative for cough and shortness of breath  CARDIOVASCULAR: Negative for chest pain, palpitations  GASTROINTESTINAL: See HPI  GENITOURINARY: Negative for dysuria and frequency  MUSCULOSKELETAL: Negative for arthralgias and myalgias  NEUROLOGICAL: Negative for dizziness and headaches  BEHAVIOR/PSYCH: Negative for anxiety and poor appetite    PHYSICAL EXAM:   There were no vitals taken for this visit.    Not completed due to telephone visit.     Labs/Imaging/Procedures:     Patient's pertinent labs  and imaging were reviewed and discussed with patient today.     Lab Results   Component Value Date    WBC 8.6 07/02/2025    RBC 3.65 (L) 07/02/2025    HGB 8.9 (L) 07/02/2025    HCT 29.5 (L) 07/02/2025    MCV 80.8 07/02/2025    MCH 24.4 (L) 07/02/2025    MCHC 30.2 (L) 07/02/2025    RDW 18.1 (H) 07/02/2025    .0 07/02/2025        Lab Results   Component Value Date     (H) 06/18/2025    BUN 15 06/18/2025    BUNCREA 13.2 06/18/2025    CREATSERUM 1.14 06/18/2025    ANIONGAP 7 06/18/2025    GFRNAA 79 10/04/2021    GFRAA 92 10/04/2021    CA 8.9 06/18/2025    OSMOCALC 285 06/18/2025    ALKPHO 41 (L) 05/31/2025    AST 29 05/31/2025    ALT 15 05/31/2025    BILT 0.6 05/31/2025    TP 5.0 (L) 05/31/2025    ALB 3.1 (L) 05/31/2025    GLOBULIN 1.9 (L) 05/31/2025     06/18/2025    K 3.9 06/18/2025     06/18/2025    CO2 27.0 06/18/2025        CT ENTEROGRAPHY ABD/PEL W CONTRAST (CPT=74177)  Result Date: 6/18/2025  PROCEDURE: CT ENTEROGRAPHY ABD PEL W CONTRAST  (CPT-41298)  COMPARISON: None available.  INDICATIONS: Small bowel thickening.  TECHNIQUE: Multidetector CT images of the abdomen and pelvis were obtained with non-ionic intravenous contrast material. Automated exposure control for dose reduction was used. Adjustment of the mA and/or kV was done based on the patient's size. Iterative reconstruction technique for dose reduction was employed. Dose information was transmitted to the ACR (American College of Radiology) NRDR (National Radiology Data Registry), which includes the Dose Index Registry. Volumen negative oral contrast was ingested.  FINDINGS: LUNG BASES: The heart is normal in size. Aortic annular calcifications are observed. Atherosclerotic vascular calcifications are present in the coronary vessels. There is elevation left hemidiaphragm. There is dependent subsegmental atelectasis bilaterally. Additional scattered ground-glass and reticular opacities are present and may be atelectatic in  origin. LIVER: No enlargement, atrophy, abnormal density, or significant focal lesion is identified.  BILIARY: The gallbladder is present. PANCREAS: There is mild diffuse fatty replacement without discernible lesion, fluid collection, or ductal dilatation. SPLEEN: No enlargement.  ADRENALS:   No defined mass or abnormal enlargement.  KIDNEYS:   Symmetric enhancement is seen without evidence of hydronephrosis or underlying solid masses. Multiple well circumscribed renal hypodensities are present and are not completely characterized, but statistically likely represent cysts. GI/MESENTERY:  A small hiatal hernia is evident. Apparent gastric wall thickening is likely secondary to underdistention. There is no evidence of bowel obstruction. No suspicious bowel wall thickening or mural enhancement is evident. No perienteric or pericolonic fat stranding is appreciated. The terminal ileum appears unremarkable. No fistulous tracts are identified.  No abscess formation is detected.  A normal caliber retrocecal appendix is seen without inflammatory manifestations. Liquid fecal contents are demonstrated. There is metallic streak artifact from a presumed be due capsule in the region of the hepatic flexure. Nonspecific intraluminal hyperdensity is apparent in the ascending colon and hepatic flexure. Scattered colonic diverticula are present in the sigmoid colon. There is no colonic wall thickening or pericolonic fat stranding. URINARY BLADDER: No visible calculus; diffuse distention is manifested. There is circumferential bladder wall thickening out of proportion to the degree of distention. PELVIC NODES: No lymphadenopathy.   PELVIC ORGANS: The prostate is enlarged, measuring 5.7 cm transversely. A prominent median prostatic lobe indents the base of the urinary bladder. Coarse prostatic calcification is noted. Deep pelvic calcifications likely represent phleboliths.  VASCULATURE:   Scattered atherosclerotic vascular  calcifications of the abdominal aorta are observed. No aneurysm is detected. RETROPERITONEUM: No mass or lymphadenopathy is apparent.  BONES:   Multilevel degenerative changes are seen throughout the spine with associated vacuum disc phenomena. Multilevel anterior osteophytic bridging is manifested. 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 There are degenerative changes of the hips bilaterally. ABDOMINAL WALL: There is a minute fat-containing umbilical hernia. Mild dependent subcutaneous edema is manifested. OTHER: There is left worse than right gynecomastia. No free air or fluid is seen in the abdomen or pelvis.           CONCLUSION:  1. No conclusive evidence of active inflammatory bowel disease.  2. A video capsule is suggested within the colon, at the level of the splenic flexure.  3. Nonspecific intraluminal hyperdensity in the proximal colon is indeterminate, but could reflect hemorrhage in the appropriate clinical setting.  4. Uncomplicated distal colonic diverticulosis.   5. Prostatomegaly with chronic outlet obstruction.  6. Lesser incidental findings as above.    Dictated by (CST): Sebastian White MD on 2025 at 6:55 PM     Finalized by (CST): Sebastian White MD on 2025 at 7:04 PM          CARD ECHO 2D DOPPLER (CPT=93306)  Result Date: 2025  Transthoracic Echocardiogram Name:Benjamin Rdz DALI Date: 2025 :  1950 Ht:  (73in)  BP: 111 / 63 MRN:  [id number]    Age:  75years    Wt:  (212lb) HR: 60bpm Loc:  [facility]       Gndr: M          BSA: 2.21m^2 Sonographer: Mariposa Crow Ordering:    Eric Marinelli Consulting:  Eric Marinelli ---------------------------------------------------------------------------- History/Indications:   Murmur. ---------------------------------------------------------------------------- Procedure information:  A transthoracic complete 2D study was performed. Additional evaluation included M-mode, complete spectral Doppler, and color Doppler.  Patient status:   Inpatient.  Location:  Bedside.    No prior study was available for comparison.    This was a routine study. Transthoracic echocardiography for diagnosis, ventricular function evaluation, and assessment of valvular function. Image quality was adequate. The study was technically limited due to patient supine. ECG rhythm:   Frequent ectopies ---------------------------------------------------------------------------- Conclusions: 1. Left ventricle: The cavity size was normal. Wall thickness was at the    upper limits of normal. Systolic function was normal. The estimated    ejection fraction was 60-65%, by biplane method of disks. No diagnostic    evidence for regional wall motion abnormalities. Left ventricular    diastolic function is indeterminate. 2. Left ventricle: 3. Left atrium: The left atrial volume was mildly increased. 4. Right atrium: The atrium was dilated. 5. Aortic valve: The valve was trileaflet. The leaflets were mildly    thickened. The peak systolic velocity was 1.9m/sec. The mean systolic    gradient was 7mm Hg. The valve area (VTI) was 2.2cm^2. The valve area    (VTI) index was 1cm^2/m^2. 6. Pulmonary arteries: Systolic pressure was mildly increased, estimated to    be 36mm Hg. Impressions:  No previous study was available for comparison. * ---------------------------------------------------------------------------- * Findings: Left ventricle:  The cavity size was normal. Wall thickness was at the upper limits of normal. Systolic function was normal. The estimated ejection fraction was 60-65%, by biplane method of disks. No diagnostic evidence for regional wall motion abnormalities. Left ventricular diastolic function is indeterminate. Left atrium:  The left atrial volume was mildly increased. Right ventricle:  The cavity size was normal. Systolic function was normal. Right atrium:  The atrium was dilated. Mitral valve:  The valve was structurally normal. Leaflet separation was normal.  Doppler:   Transvalvular velocity was within the normal range. There was no evidence for stenosis. There was no significant regurgitation.    The valve area by pressure half-time was 2.78cm^2. The valve area index by pressure half-time was 1.26cm^2/m^2. Aortic valve:   The valve was trileaflet. The leaflets were mildly thickened. Cusp separation was normal.  Doppler:  Transvalvular velocity was minimally increased. There was no evidence for stenosis. There was no significant regurgitation.    The valve area (VTI) was 2.2cm^2. The valve area (VTI) index was 1cm^2/m^2.    The mean systolic gradient was 7mm Hg. The peak systolic gradient was 14mm Hg. Tricuspid valve:  The valve is structurally normal. Leaflet separation was normal.  Doppler:  Transvalvular velocity was within the normal range. There was no evidence for stenosis. There was mild regurgitation. Pulmonic valve:   The valve is structurally normal. Cusp separation was normal.  Doppler:  Transvalvular velocity was within the normal range. There was no evidence for stenosis. There was trivial regurgitation. Pericardium:   There was no pericardial effusion. Aorta: Aortic root: The aortic root was at the upper limits of normal. Ascending aorta: The ascending aorta was normal. Pulmonary arteries: Systolic pressure was mildly increased, estimated to be 36mm Hg. Systemic veins:  Central venous respirophasic diameter changes are blunted (< 50%). Inferior vena cava: The IVC was dilated. ---------------------------------------------------------------------------- Measurements  Left ventricle                  Value          Ref  IVS thickness, ED, PLAX     (H) 1.1   cm       0.6 - 1.0  LV ID, ED, PLAX                 5.5   cm       4.2 - 5.8  LV ID, ES, PLAX                 3.9   cm       2.5 - 4.0  LV PW thickness, ED, PLAX       1.0   cm       0.6 - 1.0  IVS/LV PW ratio, ED, PLAX       1.10           ---------  LV PW/LV ID ratio, ED, PLAX     0.18           ---------  LV  ejection fraction            55    %        52 - 72  Stroke volume/bsa, 2D           41    ml/m^2   ---------  LV end-diastolic volume,        180   ml       69 - 185  1-p A4C  LV ejection fraction, 1-p       64    %        46 - 74  A4C  Stroke volume, 1-p A4C          115   ml       ---------  LV end-diastolic                82    ml/m^2   37 - 93  volume/bsa, 1-p A4C  Stroke volume/bsa, 1-p A4C      52    ml/m^2   ---------  LV end-diastolic volume,    (H) 167   ml       62 - 150  2-p  LV end-systolic volume, 2-p     61    ml       21 - 61  LV ejection fraction, 2-p       64    %        52 - 72  Stroke volume, 2-p              106   ml       ---------  LV end-diastolic            (H) 76    ml/m^2   34 - 74  volume/bsa, 2-p  LV end-systolic volume/bsa,     27    ml/m^2   11 - 31  2-p  Stroke volume/bsa, 2-p          48.1  ml/m^2   ---------  LV e', lateral                  10.2  cm/sec   >=10.0  LV E/e', lateral                9              <=13  LV e', medial                   8.7   cm/sec   >=7.0  LV E/e', medial                 11             ---------  LV e', average                  9.5   cm/sec   ---------  LV E/e', average                10             <=14  LVOT                            Value          Ref  LVOT ID                         2.2   cm       ---------  LVOT peak velocity, S           1.1   m/sec    ---------  LVOT VTI, S                     24.8  cm       ---------  LVOT peak gradient, S           5     mm Hg    ---------  LVOT mean gradient, S           3     mm Hg    ---------  Stroke volume (SV), LVOT DP     94    ml       ---------  Stroke index (SV/bsa), LVOT     43    ml/m^2   ---------  DP  Aortic valve                    Value          Ref  Aortic valve peak velocity,     1.9   m/sec    ---------  S  Aortic valve VTI, S             41.1  cm       ---------  Aortic mean gradient, S         7     mm Hg    ---------  Aortic peak gradient, S         14    mm Hg    ---------  Aortic valve  area, VTI          2.2   cm^2     ---------  Aortic valve area/bsa, VTI      1     cm^2/m^2 ---------  Velocity ratio, peak,           0.57           ---------  LVOT/AV  Aortic root                     Value          Ref  Aortic root ID                  3.8   cm       2.8 - 4.3  Ascending aorta                 Value          Ref  Ascending aorta ID              3.7   cm       2.2 - 3.8  Left atrium                     Value          Ref  LA ID, A-P, ES              (H) 5.0   cm       3.0 - 4.0  LA volume, S                (H) 91    ml       18 - 58  LA volume/bsa, S            (H) 41    ml/m^2   16 - 34  LA volume, ES, 1-p A4C      (H) 88    ml       18 - 58  LA volume, ES, 1-p A2C      (H) 88    ml       18 - 58  LA volume, ES, A/L              95    ml       ---------  LA volume/bsa, ES, A/L      (H) 43    ml/m^2   16 - 34  LA/aortic root ratio            1.32           ---------  Mitral valve                    Value          Ref  Mitral E-wave peak velocity     0.94  m/sec    ---------  Mitral A-wave peak velocity     1.01  m/sec    ---------  Mitral deceleration time        270   ms       ---------  Mitral pressure half-time       79    ms       ---------  Mitral peak gradient, D         4     mm Hg    ---------  Mitral E/A ratio, peak          0.9            ---------  Mitral valve area, PHT, DP      2.78  cm^2     ---------  Mitral valve area/bsa, PHT,     1.26  cm^2/m^2 ---------  DP  Pulmonary artery                Value          Ref  PA pressure, S, DP              36    mm Hg    ---------  Systemic veins                  Value          Ref  Estimated CVP                   15    mm Hg    ---------  Inferior vena cava              Value          Ref  ID                          (H) 2.4   cm       <=2.1  Right ventricle                 Value          Ref  TAPSE, MM                       2.65  cm       >=1.70  RV pressure, S, DP              36    mm Hg    ---------  RV s', lateral                  18.3   cm/sec   >=9.5 Legend: (L)  and  (H)  yahaira values outside specified reference range. ---------------------------------------------------------------------------- Prepared and electronically signed by Angelia Roman 06/17/2025 10:01             .  ASSESSMENT/PLAN:   Benjamin Rachel Jr. is a 75 year old year-old male with history of BMI 30.40, BPH, Chronic venous insufficiency, HTN, HLD, PAD who presents for telephone visit for hospital follow up.     #acute on chronic anemia  -recheck labs with PCP noted improvement of hemoglobin to 8.9  -taking oral iron BID, constipated  -will plan to decrease to once a day and repeat lab work in 1 week   -will contact patient with results    Total face to face time was 15, more than 50% of the time was spent in counseling and/or coordination of care related to the above.          Orders This Visit:  Orders Placed This Encounter   Procedures    Ferritin    Iron And Tibc       Meds This Visit:  Requested Prescriptions      No prescriptions requested or ordered in this encounter       Imaging & Referrals:  None      Kaitlin Aceves PA-C   7/7/2025        This note was partially prepared using Dragon Medical voice recognition dictation software. As a result, errors may occur. When identified, these errors have been corrected. While every attempt is made to correct errors during dictation, discrepancies may still exist.          [1]   Past Medical History:   BPH (benign prostatic hyperplasia)    Chronic venous insufficiency of lower extremity    Contact dermatitis    to legs; since 1988    Essential hypertension    High blood pressure    High cholesterol    History of right inguinal hernia repair    age 7     Nasal polyps    ENT Dr Mariano COFFMAN (peripheral artery disease)    S/P LASIK surgery of both eyes    Toe deformity    since 1988   [2]   Past Surgical History:  Procedure Laterality Date    Colonoscopy N/A 8/10/2021    Procedure: COLONOSCOPY;  Surgeon: Fareed Jackson MD;   Location: [facility] ENDOSCOPY    Colonoscopy N/A 1/9/2025    Procedure: COLONOSCOPY;  Surgeon: Fareed Jackson MD;  Location: [facility] ENDOSCOPY    Excision turbinate,submucous  12/27/2019    Eye surgery      Hernia surgery      Nasal scope,bx/rmv polyp/debrid  12/27/2019    Nasal scopy,explor frontal sinus  12/27/2019    Nasal scopy,remv part ethmoid  12/27/2019    Nasal scopy,remv tiss sphenoid  12/27/2019    Nasal scopy,rmv tiss maxill sinus  12/27/2019   [3]   Family History  Problem Relation Age of Onset    No Known Problems Father     No Known Problems Mother     Diabetes Sister    [4]   Social History  Socioeconomic History    Marital status: Single   Tobacco Use    Smoking status: Never    Smokeless tobacco: Never   Vaping Use    Vaping status: Never Used   Substance and Sexual Activity    Alcohol use: No     Comment: Over 40 yrs    Drug use: Never     Social Drivers of Health     Food Insecurity: No Food Insecurity (6/16/2025)    NCSS - Food Insecurity     Worried About Running Out of Food in the Last Year: No     Ran Out of Food in the Last Year: No   Transportation Needs: No Transportation Needs (6/16/2025)    NCSS - Transportation     Lack of Transportation: No   Housing Stability: Not At Risk (6/16/2025)    NCSS - Housing/Utilities     Has Housing: Yes     Worried About Losing Housing: No     Unable to Get Utilities: No   [5]   Current Outpatient Medications   Medication Sig Dispense Refill    fluticasone propionate 50 MCG/ACT Nasal Suspension 1 spray by Each Nare route 2 (two) times daily.      [START ON 7/31/2025] pantoprazole 40 MG Oral Tab EC Take 1 tablet (40 mg total) by mouth every morning before breakfast. 90 tablet 3    Ferrous Sulfate (IRON) 325 (65 Fe) MG Oral Tab Take 1 tablet by mouth daily. 90 tablet 1    pantoprazole 40 MG Oral Tab EC Take 1 tablet (40 mg total) by mouth 2 (two) times daily before meals. 120 tablet 0    atorvastatin 40 MG Oral Tab Take 1 tablet (40 mg total) by mouth daily.  90 tablet 3    amLODIPine 10 MG Oral Tab Take 1 tablet (10 mg total) by mouth daily. 90 tablet 3   [6]   Allergies  Allergen Reactions    Soap RASH and OTHER (SEE COMMENTS)     Tide no dye     Skin reaction, also itching

## 2025-07-07 NOTE — TELEPHONE ENCOUNTER
Kaitlin POTTER    I spoke to Flora with [facility]  They are going to draw the iron tibc and ferritin that you ordered along with Dr. AUSTIN Briones's CBC next week and fax us the results.  Confirmed she has our fax number.    Thank you  Ok to sign encounter when done.

## 2025-07-14 ENCOUNTER — LAB REQUISITION (OUTPATIENT)
Dept: LAB | Facility: HOSPITAL | Age: 75
End: 2025-07-14
Payer: MEDICARE

## 2025-07-14 DIAGNOSIS — D50.0 IRON DEFICIENCY ANEMIA SECONDARY TO BLOOD LOSS (CHRONIC): ICD-10-CM

## 2025-07-14 LAB
BASOPHILS # BLD AUTO: 0.1 X10(3) UL (ref 0–0.2)
BASOPHILS NFR BLD AUTO: 1.1 %
DEPRECATED HBV CORE AB SER IA-ACNC: 27 NG/ML (ref 50–336)
DEPRECATED RDW RBC AUTO: 49.8 FL (ref 35.1–46.3)
EOSINOPHIL # BLD AUTO: 0.72 X10(3) UL (ref 0–0.7)
EOSINOPHIL NFR BLD AUTO: 8.1 %
ERYTHROCYTE [DISTWIDTH] IN BLOOD BY AUTOMATED COUNT: 17.9 % (ref 11–15)
HCT VFR BLD AUTO: 33.3 % (ref 39–53)
HGB BLD-MCNC: 10.3 G/DL (ref 13–17.5)
IMM GRANULOCYTES # BLD AUTO: 0.03 X10(3) UL (ref 0–1)
IMM GRANULOCYTES NFR BLD: 0.3 %
IRON SATN MFR SERPL: 4 % (ref 20–50)
IRON SERPL-MCNC: 15 UG/DL (ref 65–175)
LYMPHOCYTES # BLD AUTO: 1.77 X10(3) UL (ref 1–4)
LYMPHOCYTES NFR BLD AUTO: 19.8 %
MCH RBC QN AUTO: 23.6 PG (ref 26–34)
MCHC RBC AUTO-ENTMCNC: 30.9 G/DL (ref 31–37)
MCV RBC AUTO: 76.4 FL (ref 80–100)
MONOCYTES # BLD AUTO: 0.58 X10(3) UL (ref 0.1–1)
MONOCYTES NFR BLD AUTO: 6.5 %
NEUTROPHILS # BLD AUTO: 5.72 X10 (3) UL (ref 1.5–7.7)
NEUTROPHILS # BLD AUTO: 5.72 X10(3) UL (ref 1.5–7.7)
NEUTROPHILS NFR BLD AUTO: 64.2 %
PLATELET # BLD AUTO: 327 10(3)UL (ref 150–450)
RBC # BLD AUTO: 4.36 X10(6)UL (ref 3.8–5.8)
TOTAL IRON BINDING CAPACITY: 395 UG/DL (ref 250–425)
TRANSFERRIN SERPL-MCNC: 316 MG/DL (ref 215–365)
WBC # BLD AUTO: 8.9 X10(3) UL (ref 4–11)

## 2025-07-14 PROCEDURE — 83540 ASSAY OF IRON: CPT | Performed by: INTERNAL MEDICINE

## 2025-07-14 PROCEDURE — 85025 COMPLETE CBC W/AUTO DIFF WBC: CPT | Performed by: INTERNAL MEDICINE

## 2025-07-14 PROCEDURE — 84466 ASSAY OF TRANSFERRIN: CPT | Performed by: INTERNAL MEDICINE

## 2025-07-14 PROCEDURE — 82728 ASSAY OF FERRITIN: CPT | Performed by: INTERNAL MEDICINE

## 2025-07-15 ENCOUNTER — RESULTS FOLLOW-UP (OUTPATIENT)
Dept: LAB | Facility: HOSPITAL | Age: 75
End: 2025-07-15

## 2025-07-28 ENCOUNTER — LAB REQUISITION (OUTPATIENT)
Dept: LAB | Facility: HOSPITAL | Age: 75
End: 2025-07-28

## 2025-07-28 DIAGNOSIS — D50.9 IRON DEFICIENCY ANEMIA, UNSPECIFIED: ICD-10-CM

## 2025-07-28 LAB
BASOPHILS # BLD AUTO: 0.09 X10(3) UL (ref 0–0.2)
BASOPHILS NFR BLD AUTO: 0.9 %
DEPRECATED RDW RBC AUTO: 52.4 FL (ref 35.1–46.3)
EOSINOPHIL # BLD AUTO: 0.82 X10(3) UL (ref 0–0.7)
EOSINOPHIL NFR BLD AUTO: 8.4 %
ERYTHROCYTE [DISTWIDTH] IN BLOOD BY AUTOMATED COUNT: 18.4 % (ref 11–15)
HCT VFR BLD AUTO: 39 % (ref 39–53)
HGB BLD-MCNC: 11.5 G/DL (ref 13–17.5)
IMM GRANULOCYTES # BLD AUTO: 0.03 X10(3) UL (ref 0–1)
IMM GRANULOCYTES NFR BLD: 0.3 %
LYMPHOCYTES # BLD AUTO: 2.11 X10(3) UL (ref 1–4)
LYMPHOCYTES NFR BLD AUTO: 21.6 %
MCH RBC QN AUTO: 23.2 PG (ref 26–34)
MCHC RBC AUTO-ENTMCNC: 29.5 G/DL (ref 31–37)
MCV RBC AUTO: 78.6 FL (ref 80–100)
MONOCYTES # BLD AUTO: 0.85 X10(3) UL (ref 0.1–1)
MONOCYTES NFR BLD AUTO: 8.7 %
NEUTROPHILS # BLD AUTO: 5.86 X10 (3) UL (ref 1.5–7.7)
NEUTROPHILS # BLD AUTO: 5.86 X10(3) UL (ref 1.5–7.7)
NEUTROPHILS NFR BLD AUTO: 60.1 %
PLATELET # BLD AUTO: 286 10(3)UL (ref 150–450)
RBC # BLD AUTO: 4.96 X10(6)UL (ref 3.8–5.8)
WBC # BLD AUTO: 9.8 X10(3) UL (ref 4–11)

## 2025-07-28 PROCEDURE — 85025 COMPLETE CBC W/AUTO DIFF WBC: CPT | Performed by: INTERNAL MEDICINE

## 2025-08-01 ENCOUNTER — LAB REQUISITION (OUTPATIENT)
Dept: LAB | Facility: HOSPITAL | Age: 75
End: 2025-08-01

## 2025-08-01 ENCOUNTER — TELEPHONE (OUTPATIENT)
Facility: CLINIC | Age: 75
End: 2025-08-01

## 2025-08-01 DIAGNOSIS — K26.9 DUODENAL ULCER: Primary | ICD-10-CM

## 2025-08-01 DIAGNOSIS — D50.0 IRON DEFICIENCY ANEMIA SECONDARY TO BLOOD LOSS (CHRONIC): ICD-10-CM

## 2025-08-01 DIAGNOSIS — D64.9 SYMPTOMATIC ANEMIA: ICD-10-CM

## 2025-08-01 LAB
BASOPHILS # BLD AUTO: 0.07 X10(3) UL (ref 0–0.2)
BASOPHILS NFR BLD AUTO: 1 %
DEPRECATED RDW RBC AUTO: 52.9 FL (ref 35.1–46.3)
EOSINOPHIL # BLD AUTO: 0.66 X10(3) UL (ref 0–0.7)
EOSINOPHIL NFR BLD AUTO: 9.1 %
ERYTHROCYTE [DISTWIDTH] IN BLOOD BY AUTOMATED COUNT: 19.1 % (ref 11–15)
HCT VFR BLD AUTO: 37.4 % (ref 39–53)
HGB BLD-MCNC: 11.1 G/DL (ref 13–17.5)
IMM GRANULOCYTES # BLD AUTO: 0.02 X10(3) UL (ref 0–1)
IMM GRANULOCYTES NFR BLD: 0.3 %
LYMPHOCYTES # BLD AUTO: 1.49 X10(3) UL (ref 1–4)
LYMPHOCYTES NFR BLD AUTO: 20.6 %
MCH RBC QN AUTO: 23 PG (ref 26–34)
MCHC RBC AUTO-ENTMCNC: 29.7 G/DL (ref 31–37)
MCV RBC AUTO: 77.6 FL (ref 80–100)
MONOCYTES # BLD AUTO: 0.63 X10(3) UL (ref 0.1–1)
MONOCYTES NFR BLD AUTO: 8.7 %
NEUTROPHILS # BLD AUTO: 4.36 X10 (3) UL (ref 1.5–7.7)
NEUTROPHILS # BLD AUTO: 4.36 X10(3) UL (ref 1.5–7.7)
NEUTROPHILS NFR BLD AUTO: 60.3 %
PLATELET # BLD AUTO: 252 10(3)UL (ref 150–450)
PLATELETS.RETICULATED NFR BLD AUTO: 7 % (ref 0–7)
RBC # BLD AUTO: 4.82 X10(6)UL (ref 3.8–5.8)
WBC # BLD AUTO: 7.2 X10(3) UL (ref 4–11)

## 2025-08-01 PROCEDURE — 85025 COMPLETE CBC W/AUTO DIFF WBC: CPT | Performed by: INTERNAL MEDICINE

## 2025-08-11 ENCOUNTER — RESULTS FOLLOW-UP (OUTPATIENT)
Dept: LAB | Facility: HOSPITAL | Age: 75
End: 2025-08-11

## 2025-08-11 ENCOUNTER — LAB REQUISITION (OUTPATIENT)
Dept: LAB | Facility: HOSPITAL | Age: 75
End: 2025-08-11

## 2025-08-11 DIAGNOSIS — D50.0 IRON DEFICIENCY ANEMIA SECONDARY TO BLOOD LOSS (CHRONIC): ICD-10-CM

## 2025-08-11 LAB
BASOPHILS # BLD AUTO: 0.05 X10(3) UL (ref 0–0.2)
BASOPHILS NFR BLD AUTO: 0.8 %
DEPRECATED HBV CORE AB SER IA-ACNC: 27 NG/ML (ref 50–336)
DEPRECATED RDW RBC AUTO: 54.3 FL (ref 35.1–46.3)
EOSINOPHIL # BLD AUTO: 0.39 X10(3) UL (ref 0–0.7)
EOSINOPHIL NFR BLD AUTO: 6.4 %
ERYTHROCYTE [DISTWIDTH] IN BLOOD BY AUTOMATED COUNT: 19.9 % (ref 11–15)
HCT VFR BLD AUTO: 40.1 % (ref 39–53)
HGB BLD-MCNC: 12.1 G/DL (ref 13–17.5)
IMM GRANULOCYTES # BLD AUTO: 0.01 X10(3) UL (ref 0–1)
IMM GRANULOCYTES NFR BLD: 0.2 %
IRON SATN MFR SERPL: 12 % (ref 20–50)
IRON SERPL-MCNC: 43 UG/DL (ref 65–175)
LYMPHOCYTES # BLD AUTO: 1.12 X10(3) UL (ref 1–4)
LYMPHOCYTES NFR BLD AUTO: 18.4 %
MCH RBC QN AUTO: 23.1 PG (ref 26–34)
MCHC RBC AUTO-ENTMCNC: 30.2 G/DL (ref 31–37)
MCV RBC AUTO: 76.7 FL (ref 80–100)
MONOCYTES # BLD AUTO: 0.59 X10(3) UL (ref 0.1–1)
MONOCYTES NFR BLD AUTO: 9.7 %
NEUTROPHILS # BLD AUTO: 3.93 X10 (3) UL (ref 1.5–7.7)
NEUTROPHILS # BLD AUTO: 3.93 X10(3) UL (ref 1.5–7.7)
NEUTROPHILS NFR BLD AUTO: 64.5 %
PLATELET # BLD AUTO: 252 10(3)UL (ref 150–450)
PLATELETS.RETICULATED NFR BLD AUTO: 6.8 % (ref 0–7)
RBC # BLD AUTO: 5.23 X10(6)UL (ref 3.8–5.8)
TOTAL IRON BINDING CAPACITY: 356 UG/DL (ref 250–425)
TRANSFERRIN SERPL-MCNC: 276 MG/DL (ref 215–365)
WBC # BLD AUTO: 6.1 X10(3) UL (ref 4–11)

## 2025-08-11 PROCEDURE — 82728 ASSAY OF FERRITIN: CPT | Performed by: PHYSICIAN ASSISTANT

## 2025-08-11 PROCEDURE — 84466 ASSAY OF TRANSFERRIN: CPT | Performed by: PHYSICIAN ASSISTANT

## 2025-08-11 PROCEDURE — 83540 ASSAY OF IRON: CPT | Performed by: PHYSICIAN ASSISTANT

## 2025-08-11 PROCEDURE — 85025 COMPLETE CBC W/AUTO DIFF WBC: CPT | Performed by: PHYSICIAN ASSISTANT

## 2025-08-15 ENCOUNTER — TELEPHONE (OUTPATIENT)
Facility: CLINIC | Age: 75
End: 2025-08-15

## 2025-08-15 DIAGNOSIS — K26.9 DUODENAL ULCER: Primary | ICD-10-CM

## 2025-08-15 DIAGNOSIS — D64.9 SYMPTOMATIC ANEMIA: ICD-10-CM

## 2025-08-25 ENCOUNTER — LAB REQUISITION (OUTPATIENT)
Dept: LAB | Facility: HOSPITAL | Age: 75
End: 2025-08-25

## 2025-08-25 DIAGNOSIS — E87.6 HYPOKALEMIA: ICD-10-CM

## 2025-08-25 LAB
BASOPHILS # BLD AUTO: 0.06 X10(3) UL (ref 0–0.2)
BASOPHILS NFR BLD AUTO: 0.8 %
DEPRECATED HBV CORE AB SER IA-ACNC: 32 NG/ML (ref 50–336)
DEPRECATED RDW RBC AUTO: 55.7 FL (ref 35.1–46.3)
EOSINOPHIL # BLD AUTO: 0.38 X10(3) UL (ref 0–0.7)
EOSINOPHIL NFR BLD AUTO: 5.1 %
ERYTHROCYTE [DISTWIDTH] IN BLOOD BY AUTOMATED COUNT: 21.2 % (ref 11–15)
HCT VFR BLD AUTO: 42.6 % (ref 39–53)
HGB BLD-MCNC: 13.3 G/DL (ref 13–17.5)
IMM GRANULOCYTES # BLD AUTO: 0.03 X10(3) UL (ref 0–1)
IMM GRANULOCYTES NFR BLD: 0.4 %
IRON SATN MFR SERPL: 14 % (ref 20–50)
IRON SERPL-MCNC: 53 UG/DL (ref 65–175)
LYMPHOCYTES # BLD AUTO: 1.4 X10(3) UL (ref 1–4)
LYMPHOCYTES NFR BLD AUTO: 18.8 %
MCH RBC QN AUTO: 24.3 PG (ref 26–34)
MCHC RBC AUTO-ENTMCNC: 31.2 G/DL (ref 31–37)
MCV RBC AUTO: 77.9 FL (ref 80–100)
MONOCYTES # BLD AUTO: 0.53 X10(3) UL (ref 0.1–1)
MONOCYTES NFR BLD AUTO: 7.1 %
NEUTROPHILS # BLD AUTO: 5.04 X10 (3) UL (ref 1.5–7.7)
NEUTROPHILS # BLD AUTO: 5.04 X10(3) UL (ref 1.5–7.7)
NEUTROPHILS NFR BLD AUTO: 67.8 %
PLATELET # BLD AUTO: 230 10(3)UL (ref 150–450)
PLATELETS.RETICULATED NFR BLD AUTO: 7.4 % (ref 0–7)
RBC # BLD AUTO: 5.47 X10(6)UL (ref 3.8–5.8)
TOTAL IRON BINDING CAPACITY: 374 UG/DL (ref 250–425)
TRANSFERRIN SERPL-MCNC: 278 MG/DL (ref 215–365)
WBC # BLD AUTO: 7.4 X10(3) UL (ref 4–11)

## 2025-08-25 PROCEDURE — 83540 ASSAY OF IRON: CPT

## 2025-08-25 PROCEDURE — 85025 COMPLETE CBC W/AUTO DIFF WBC: CPT | Performed by: PHYSICIAN ASSISTANT

## 2025-08-25 PROCEDURE — 84466 ASSAY OF TRANSFERRIN: CPT | Performed by: PHYSICIAN ASSISTANT

## 2025-08-25 PROCEDURE — 82728 ASSAY OF FERRITIN: CPT | Performed by: PHYSICIAN ASSISTANT

## 2025-08-25 PROCEDURE — 85025 COMPLETE CBC W/AUTO DIFF WBC: CPT

## 2025-08-25 PROCEDURE — 82728 ASSAY OF FERRITIN: CPT

## 2025-08-25 PROCEDURE — 84466 ASSAY OF TRANSFERRIN: CPT

## 2025-08-25 PROCEDURE — 83540 ASSAY OF IRON: CPT | Performed by: PHYSICIAN ASSISTANT

## (undated) DEVICE — NASAL ACCESSORY: Brand: MEDLINE INDUSTRIES, INC.

## (undated) DEVICE — LINE MNTR ADLT SET O2 INTMD

## (undated) DEVICE — SHEATH 1912040 5PK ES2 STRYKER 4MM/0DEG: Brand: ENDO-SCRUB®

## (undated) DEVICE — KIT CLEAN ENDOKIT 1.1OZ GOWNX2

## (undated) DEVICE — BLADE SHVR 11CM 2MM XPS 360D

## (undated) DEVICE — INSTRUMENT TRACKER 9733533XOM ENT 1PK

## (undated) DEVICE — 60 ML SYRINGE REGULAR TIP: Brand: MONOJECT

## (undated) DEVICE — EYE PADSSTERILENOT MADE WITH NATURAL RUBBER LATEXSINGLE USE ONLYDO NOT USE IF PACKAGE OPENED OR DAMAGED: Brand: CARDINAL HEALTH

## (undated) DEVICE — MEDI-VAC NON-CONDUCTIVE SUCTION TUBING: Brand: CARDINAL HEALTH

## (undated) DEVICE — DUAL LUMEN STOMACH TUBE: Brand: SALEM SUMP

## (undated) DEVICE — GAUZE PACKING PLAIN 1/2

## (undated) DEVICE — SOL  .9 1000ML BTL

## (undated) DEVICE — KIT ENDO ORCAPOD 160/180/190

## (undated) DEVICE — CAPSULE ENDOSCP 11.4X26.2MM BIOCOMPATIBLE

## (undated) DEVICE — SUTURE CHROMIC GUT 4-0 P-3

## (undated) DEVICE — MEDI-VAC NON-CONDUCTIVE SUCTION TUBING 6MM X 1.8M (6FT.) L: Brand: CARDINAL HEALTH

## (undated) DEVICE — Device

## (undated) DEVICE — Device: Brand: CUSTOM PROCEDURE KIT

## (undated) DEVICE — Device: Brand: DEFENDO AIR/WATER/SUCTION AND BIOPSY VALVE

## (undated) DEVICE — YANKAUER,BULB TIP,W/O VENT,RIGID,STERILE: Brand: MEDLINE

## (undated) DEVICE — SUCTION CANISTER, 3000CC,SAFELINER: Brand: DEROYAL

## (undated) DEVICE — FORCEP RADIAL JAW 4

## (undated) DEVICE — SUTURE SILK 2-0 623H

## (undated) DEVICE — GOWN SURG AERO BLUE PERF LG

## (undated) DEVICE — PACKING 8CM LNG SPT NSL STNG

## (undated) DEVICE — 35 ML SYRINGE REGULAR TIP: Brand: MONOJECT

## (undated) DEVICE — GAMMEX® PI HYBRID SIZE 6.5, STERILE POWDER-FREE SURGICAL GLOVE, POLYISOPRENE AND NEOPRENE BLEND: Brand: GAMMEX

## (undated) DEVICE — PAD,EYE,LARGE,2 1/8"X2 5/8",STERILE,LF: Brand: MEDLINE

## (undated) DEVICE — 3 ML SYRINGE LUER-LOCK TIP: Brand: MONOJECT

## (undated) DEVICE — DEVICE ENDOSCP DEL SHTH L180CM DIA2.5MM

## (undated) DEVICE — CONMED SCOPE SAVER BITE BLOCK, 20X27 MM: Brand: SCOPE SAVER

## (undated) DEVICE — SUTURE PLAIN GUT 4-0 SC-1

## (undated) DEVICE — ENCORE® LATEX MICRO SIZE 6, STERILE LATEX POWDER-FREE SURGICAL GLOVE: Brand: ENCORE

## (undated) DEVICE — HEAD & NECK: Brand: MEDLINE INDUSTRIES, INC.

## (undated) DEVICE — V2 SPECIMEN COLLECTION MANIFOLD KIT: Brand: NEPTUNE

## (undated) DEVICE — GIJAW SINGLE-USE BIOPSY FORCEPS WITH NEEDLE: Brand: GIJAW

## (undated) DEVICE — NEEDLE SPINAL 25X3-1/2 BLUE

## (undated) DEVICE — SOLUTION  .9 1000ML BTL

## (undated) DEVICE — WIPE WITH WICK AND CORNEAL SHIELD: Brand: MEROCEL

## (undated) DEVICE — SUCTION 9735018 MALLEABLE BALL TIP 9-FR

## (undated) DEVICE — SHEATH 1912041 5PK ES2 STRYKER 4MM/30DEG: Brand: ENDO-SCRUB®

## (undated) DEVICE — ELECTROSURGICAL SUCTION COAGULATOR, 10FR

## (undated) DEVICE — BLADE 1884380EM QUADCUT 4.3MMX13CM ROHS: Brand: ROTATABLE FUSION®

## (undated) DEVICE — SNARE OPTMZ PLPCTM TRP

## (undated) DEVICE — STANDARD HYPODERMIC NEEDLE,POLYPROPYLENE HUB: Brand: MONOJECT

## (undated) DEVICE — SKIN PREP TRAY 4 COMPARTM TRAY: Brand: MEDLINE INDUSTRIES, INC.

## (undated) DEVICE — NON-ADHERENT PADS PREPACK: Brand: TELFA

## (undated) DEVICE — SYRINGE 10ML LL CONTRL SYRINGE

## (undated) DEVICE — SNARE CAPTIFLEX MICRO-OVL OLY

## (undated) DEVICE — PATIENT TRACKER 9734887XOM NON-INVASIVE

## (undated) NOTE — LETTER
[facility]SW/SE  [address]  [facility] [address]  [phone]    Blood Transfusion Consent    In the course of your treatment, it may become necessary to administer a transfusion of blood or blood components. This form provides basic information concerning this procedure and, if signed by you, authorizes its administration. By signing this form, you agree that all of your questions about the administration of blood or blood products have been answered by the ordering medical professional or designee.    Description of Procedure  Blood is introduced into one of your veins, commonly in the arm, using a sterilized disposable needle. The amount of blood transfused, and whether the transfusion will be of blood or blood components is a judgement the physician will make based on your particular needs.    Risks  The transfusion is a common procedure of low risk.  MINOR AND TEMPORARY REACTIONS ARE NOT UNCOMMON, including a slight bruise, swelling or local reaction in the area where the needle pierces your skin, or a nonserious reaction to the transfused material itself, including headache, fever or mild skin reaction, such as rash.  Serious reactions are possible, though very unlikely, and include severe allergic reaction (shock) and destruction (hemolysis) of transfused blood cells.  Infectious diseases which are known to be transmitted by blood transfusion include certain types of viral Hepatitis(liver infection from a virus), Human Immunodeficiency Virus (HIV-1,2) infection, a viral infection known to cause Acquired Immunodeficiency Syndrome (AIDS), as well as certain other bacterial, viral, and parasitic diseases. While a minimal risk of acquiring an infectious disease from transfused blood exists, in accordance with the Federal and State law, all due care has been taken in donor selection and testing to avoid transmission of disease.    Alternatives  If loss of blood poses serious threats during your  treatment, THERE IS NO EFFECTIVE ALTERNATIVE TO BLOOD TRANSFUSION. However, if you have any further questions on this matter, your provider will fully explain the alternatives to you if it has not already been done.    I, ______________________________, have read/had read to me the above. I understand the matters bearing on the decision whether or not to authorize a transfusion of blood or blood components. I have no questions which have not been answered to my full satisfaction. I hereby consent to such transfusion as my physician may deem necessary or advisable in the course of my treatment.    ______________________________________________                    ___________________________  (Signature of Patient or Responsible party in case of minor,                 (Printed Name of Patient or incompetent, or unconscious patient)              Responsible Party)    ___________________________               _____________________  (Relationship to Patient if not self)                                    (Date and Time)    __________________________                                                           ______________________              (Signature of Witness)               (Printed Name of Witness)     Language line ()    Telephone/Verbal/Video Consent    __________________________                     ____________________  (Signature of 2nd Witness           (Printed Name of 2nd  Telephone/Verbal/Video Consent)           Witness)    Patient Name: Benjamin Rachel JrElena     : 1950                 Printed: 2025     Medical Record #: [id number]      Rev: 2023[place], [place] [address]  Authorization for Invasive Procedures

## (undated) NOTE — LETTER
[place] [address]  Authorization for Invasive Procedures  Date: 06/16/2025           Time: 17:54    I hereby authorize Dr. Del Rio, my physician and his/her assistants (if applicable), which may include medical students, residents, and/or fellows, to perform the following surgical operation/ procedure and administer such anesthesia as may be determined necessary by my physician:  If not ordering MD, who will perform procedure?  esophagogastroduodenoscopy and video capsule endoscopy on Benjamin DALI Cunninghamclaritza   2.   I recognize that during the surgical operation/procedure, unforeseen conditions may necessitate additional or different procedures than those listed above.  I, therefore, further authorize and request that the above-named surgeon, assistants, or designees perform such procedures as are, in their judgment, necessary and desirable.    3.   My surgeon/physician has discussed prior to my surgery the potential benefits, risks and side effects of this procedure; the likelihood of achieving goals; and potential problems that might occur during recuperation.  They also discussed reasonable alternatives to the procedure, including risks, benefits, and side effects related to the alternatives and risks related to not receiving this procedure.  I have had all my questions answered and I acknowledge that no guarantee has been made as to the result that may be obtained.    4.   Should the need arise during my operation/procedure, which includes change of level of care prior to discharge, I also consent to the administration of blood and/or blood products.  Further, I understand that despite careful testing and screening of blood or blood products by collecting agencies, I may still be subject to ill effects as a result of receiving a blood transfusion and/or blood products.  The following are some, but not all, of the potential risks that can occur: fever and allergic reactions, hemolytic  reactions, transmission of diseases such as Hepatitis, AIDS and Cytomegalovirus (CMV) and fluid overload.  In the event that I wish to have an autologous transfusion of my own blood, or a directed donor transfusion, I will discuss this with my physician.   Check only if Refusing Blood or Blood Products  I understand refusal of blood or blood products as deemed necessary by my physician may have serious consequences to my condition to include possible death. I hereby assume responsibility for my refusal and release the hospital, its personnel, and my physicians from any responsibility for the consequences of my refusal.         o  Refuse         5.   I authorize the use of any specimen, organs, tissues, body parts or foreign objects that may be removed from my body during the operation/procedure for diagnosis, research or teaching purposes and their subsequent disposal by hospital authorities.  I also authorize the release of specimen test results and/or written reports to my treating physician on the hospital medical staff or other referring or consulting physicians involved in my care, at the discretion of the Pathologist or my treating physician.    6.   I consent to the photographing or videotaping of the operations or procedures to be performed, including appropriate portions of my body for medical, scientific, or educational purposes, provided my identity is not revealed by the pictures or by descriptive texts accompanying them.  If the procedure has been photographed/videotaped, the surgeon will obtain the original picture, image, videotape or CD.  The hospital will not be responsible for storage, release or maintenance of the picture, image, tape or CD.    7.   I consent to the presence of a  or observers in the operating room as deemed necessary by my physician or their designees.    8.   I recognize that in the event my procedure results in extended X-Ray/fluoroscopy time, I may develop a  skin reaction.    9. If I have a Do Not Attempt Resuscitation (DNAR) order in place, that status will be suspended while in the operating room, procedural suite, and during the recovery period unless otherwise explicitly stated by me (or a person authorized to consent on my behalf). The surgeon or my attending physician will determine when the applicable recovery period ends for purposes of reinstating the DNAR order.  10. Patients having a sterilization procedure: I understand that if the procedure is successful the results will be permanent and it will therefore be impossible for me to inseminate, conceive, or bear children.  I also understand that the procedure is intended to result in sterility, although the result has not been guaranteed.   11. I acknowledge that my physician has explained sedation/analgesia administration to me including the risk and benefits I consent to the administration of sedation/analgesia as may be necessary or desirable in the judgment of my physician.    I CERTIFY THAT I HAVE READ AND FULLY UNDERSTAND THE ABOVE CONSENT TO OPERATION and/or OTHER PROCEDURE.        ____________________________________       _________________________________      ______________________________  Signature of Patient         Signature of Responsible Person        Printed Name of Responsible Person        ____________________________________      _________________________________      ______________________________       Signature of Witness          Relationship to Patient                       Date                                       Time  Patient Name: Benjamin DALI Rachel Jr.  : 1950    Reviewed: 2024   Printed: 2025  Medical Record #: [id number] Page 1 of 2             STATEMENT OF PHYSICIAN My signature below affirms that prior to the time of the procedure; I have explained to the patient and/or his/her legal representative, the risks and benefits involved in the proposed treatment  and any reasonable alternative to the proposed treatment. I have also explained the risks and benefits involved in refusal of the proposed treatment and alternatives to the proposed treatment and have answered the patient's questions. If I have a significant financial interest in a co-management agreement or a significant financial interest in any product or implant, or other significant relationship used in this procedure/surgery, I have disclosed this and had a discussion with my patient.     _______________________________________________________________ _____________________________  (Signature of Physician)                                                                                         (Date)                                   (Time)  Patient Name: Benjamin Rachel Jr.  : 1950    Reviewed: 2024   Printed: 2025  Medical Record #: [id number] Page 2 of 2

## (undated) NOTE — LETTER
[facility]  [address], [place], [place]    Authorization for Surgical Operation and Procedure                               I hereby authorize Clarisa Vuong MD, my physician and his/her assistants (if applicable), which may include medical students, residents, and/or fellows, to perform the following surgical operation/ procedure and administer such anesthesia as may be determined necessary by my physician: Operation/Procedure name (s) ESOPHAGOGASTRODUODENOSCOPY (EGD) on Benjamin MCNALLY Marisaclaritza    2.   I recognize that during the surgical operation/procedure, unforeseen conditions may necessitate additional or different procedures than those listed above.  I, therefore, further authorize and request that the above-named surgeon, assistants, or designees perform such procedures as are, in their judgment, necessary and desirable.    3.   My surgeon/physician has discussed prior to my surgery the potential benefits, risks and side effects of this procedure; the likelihood of achieving goals; and potential problems that might occur during recuperation.  They also discussed reasonable alternatives to the procedure, including risks, benefits, and side effects related to the alternatives and risks related to not receiving this procedure.  I have had all my questions answered and I acknowledge that no guarantee has been made as to the result that may be obtained.    4.   Should the need arise during my operation/procedure, which includes change of level of care prior to discharge, I also consent to the administration of blood and/or blood products.  Further, I understand that despite careful testing and screening of blood or blood products by collecting agencies, I may still be subject to ill effects as a result of receiving a blood transfusion and/or blood products.  The following are some, but not all, of the potential risks that can occur: fever and allergic reactions, hemolytic reactions, transmission  of diseases such as Hepatitis, AIDS and Cytomegalovirus (CMV) and fluid overload.  In the event that I wish to have an autologous transfusion of my own blood, or a directed donor transfusion, I will discuss this with my physician.  Check only if Refusing Blood or Blood Products  I understand refusal of blood or blood products as deemed necessary by my physician may have serious consequences to my condition to include possible death. I hereby assume responsibility for my refusal and release the hospital, its personnel, and my physicians from any responsibility for the consequences of my refusal.    o  Refuse   5.   I authorize the use of any specimen, organs, tissues, body parts or foreign objects that may be removed from my body during the operation/procedure for diagnosis, research or teaching purposes and their subsequent disposal by hospital authorities.  I also authorize the release of specimen test results and/or written reports to my treating physician on the hospital medical staff or other referring or consulting physicians involved in my care, at the discretion of the Pathologist or my treating physician.    6.   I consent to the photographing or videotaping of the operations or procedures to be performed, including appropriate portions of my body for medical, scientific, or educational purposes, provided my identity is not revealed by the pictures or by descriptive texts accompanying them.  If the procedure has been photographed/videotaped, the surgeon will obtain the original picture, image, videotape or CD.  The hospital will not be responsible for storage, release or maintenance of the picture, image, tape or CD.    7.   I consent to the presence of a  or observers in the operating room as deemed necessary by my physician or their designees.    8.   I recognize that in the event my procedure results in extended X-Ray/fluoroscopy time, I may develop a skin reaction.    9. If I have a Do  Not Attempt Resuscitation (DNAR) order in place, that status will be suspended while in the operating room, procedural suite, and during the recovery period unless otherwise explicitly stated by me (or a person authorized to consent on my behalf). The surgeon or my attending physician will determine when the applicable recovery period ends for purposes of reinstating the DNAR order.  10. Patients having a sterilization procedure: I understand that if the procedure is successful the results will be permanent and it will therefore be impossible for me to inseminate, conceive, or bear children.  I also understand that the procedure is intended to result in sterility, although the result has not been guaranteed.   11. I acknowledge that my physician has explained sedation/analgesia administration to me including the risk and benefits I consent to the administration of sedation/analgesia as may be necessary or desirable in the judgment of my physician.    I CERTIFY THAT I HAVE READ AND FULLY UNDERSTAND THE ABOVE CONSENT TO OPERATION and/or OTHER PROCEDURE.     ____________________________________  _________________________________        ______________________________  Signature of Patient    Signature of Responsible Person                Printed Name of Responsible Person                                      ____________________________________  _____________________________                ________________________________  Signature of Witness        Date  Time         Relationship to Patient    STATEMENT OF PHYSICIAN My signature below affirms that prior to the time of the procedure; I have explained to the patient and/or his/her legal representative, the risks and benefits involved in the proposed treatment and any reasonable alternative to the proposed treatment. I have also explained the risks and benefits involved in refusal of the proposed treatment and alternatives to the proposed treatment and have answered the  patient's questions. If I have a significant financial interest in a co-management agreement or a significant financial interest in any product or implant, or other significant relationship used in this procedure/surgery, I have disclosed this and had a discussion with my patient.     _____________________________________________________              _____________________________  (Signature of Physician)                                                                                         (Date)                                   (Time)  Patient Name: Benjamin Rachel Jr.      : 1950      Printed: 2025     Medical Record #: [id number]                                      Page 1 of 1

## (undated) NOTE — LETTER
[facility]  Administration of Anesthesia  I, Benjamin Rachel Jr. agree to be cared for by a physician anesthesiologist alone and/or with a nurse anesthetist, who is specially trained to monitor me and give me medicine to put me to sleep or keep me comfortable during my procedure    I understand that my anesthesiologist and/or anesthetist is not an employee or agent of [facility] or [email or web address]. He or she works for [facility]    As the patient asking for anesthesia services, I agree to:  Allow the anesthesiologist (anesthesia doctor) to give me medicine and do additional procedures as necessary. Some examples are: Starting or using an “IV” to give me medicine, fluids or blood during my procedure, and having a breathing tube placed to help me breathe when I’m asleep (intubation). In the event that my heart stops working properly, I understand that my anesthesiologist will make every effort to sustain my life, unless otherwise directed by [facility] Do Not Resuscitate documents.  Tell my anesthesia doctor before my procedure:  If I am pregnant.  The last time that I ate or drank.  iii. All of the medicines I take (including prescriptions, herbal supplements, and pills I can buy without a prescription (including street drugs/illegal medications). Failure to inform my anesthesiologist about these medicines may increase my risk of anesthetic complications.  iv.If I am allergic to anything or have had a reaction to anesthesia before.  I understand how the anesthesia medicine will help me (benefits).  I understand that with any type of anesthesia medicine there are risks:  The most common risks are: nausea, vomiting, sore throat, muscle soreness, damage to my eyes, mouth, or teeth (from breathing tube placement).  Rare risks include: remembering what happened during my procedure, allergic reactions to medications, injury to my airway, heart, lungs, vision, nerves, or  muscles and in extremely rare instances death.  My doctor has explained to me other choices available to me for my care (alternatives).  Pregnant Patients (“epidural”):  I understand that the risks of having an epidural (medicine given into my back to help control pain during labor), include itching, low blood pressure, difficulty urinating, headache or slowing of the baby’s heart. Very rare risks include infection, bleeding, seizure, irregular heart rhythms and nerve injury.  Regional Anesthesia (“spinal”, “epidural”, & “nerve blocks”):  I understand that rare but potential complications include headache, bleeding, infection, seizure, irregular heart rhythms, and nerve injury.    _____________________________________________________________________________  Patient (or Representative) Signature/Relationship to Patient  Date   Time    _____________________________________________________________________________   Name (if used)    Language/Organization   Time    _____________________________________________________________________________  Nurse Anesthetist Signature     Date   Time  _____________________________________________________________________________  Anesthesiologist Signature     Date   Time  I have discussed the procedure and information above with the patient (or patient’s representative) and answered their questions. The patient or their representative has agreed to have anesthesia services.    _____________________________________________________________________________  Witness        Date   Time  I have verified that the signature is that of the patient or patient’s representative, and that it was signed before the procedure  Patient Name: Benjamin Rachel      : 1950                 Printed: 2025 at 9:25 AM    Medical Record #: [id number]                                            Page 1 of 1  ----------ANESTHESIA CONSENT----------

## (undated) NOTE — LETTER
[facility] 5SW/SE  [address]  [facility] [address]  [phone]    Blood Transfusion Consent    In the course of your treatment, it may become necessary to administer a transfusion of blood or blood components. This form provides basic information concerning this procedure and, if signed by you, authorizes its administration. By signing this form, you agree that all of your questions about the administration of blood or blood products have been answered by the ordering medical professional or designee.    Description of Procedure  Blood is introduced into one of your veins, commonly in the arm, using a sterilized disposable needle. The amount of blood transfused, and whether the transfusion will be of blood or blood components is a judgement the physician will make based on your particular needs.    Risks  The transfusion is a common procedure of low risk.  MINOR AND TEMPORARY REACTIONS ARE NOT UNCOMMON, including a slight bruise, swelling or local reaction in the area where the needle pierces your skin, or a nonserious reaction to the transfused material itself, including headache, fever or mild skin reaction, such as rash.  Serious reactions are possible, though very unlikely, and include severe allergic reaction (shock) and destruction (hemolysis) of transfused blood cells.  Infectious diseases which are known to be transmitted by blood transfusion include certain types of viral Hepatitis(liver infection from a virus), Human Immunodeficiency Virus (HIV-1,2) infection, a viral infection known to cause Acquired Immunodeficiency Syndrome (AIDS), as well as certain other bacterial, viral, and parasitic diseases. While a minimal risk of acquiring an infectious disease from transfused blood exists, in accordance with the Federal and State law, all due care has been taken in donor selection and testing to avoid transmission of disease.    Alternatives  If loss of blood poses serious threats during your  treatment, THERE IS NO EFFECTIVE ALTERNATIVE TO BLOOD TRANSFUSION. However, if you have any further questions on this matter, your provider will fully explain the alternatives to you if it has not already been done.    I, ______________________________, have read/had read to me the above. I understand the matters bearing on the decision whether or not to authorize a transfusion of blood or blood components. I have no questions which have not been answered to my full satisfaction. I hereby consent to such transfusion as my physician may deem necessary or advisable in the course of my treatment.    ______________________________________________                    ___________________________  (Signature of Patient or Responsible party in case of minor,                 (Printed Name of Patient or incompetent, or unconscious patient)              Responsible Party)    ___________________________               _____________________  (Relationship to Patient if not self)                                    (Date and Time)    __________________________                                                           ______________________              (Signature of Witness)               (Printed Name of Witness)     Language line ()    Telephone/Verbal/Video Consent    __________________________                     ____________________  (Signature of 2nd Witness           (Printed Name of 2nd  Telephone/Verbal/Video Consent)           Witness)    Patient Name: Benjamin Rachel JrElena     : 1950                 Printed: 2025     Medical Record #: [id number]      Rev: 2023

## (undated) NOTE — LETTER
Hospital Discharge Documentation      From: [facility] Hospitalist's Office  Phone: [phone]    Patient discharged time/date: 2025  3:50 PM  Patient discharge disposition:  Home or Self Care       Discharge Summary - D/C Summary        Discharge Summary signed by Marleny Joya DO at 2025  5:53 PM  Version 1 of 1      Author: Marleny Joya DO Service: Hospitalist Author Type: Physician    Filed: 2025  5:53 PM Date of Service: 2025  5:50 PM Status: Signed    : Marleny Joya DO (Physician)           [facility]  part of [facility]    Discharge Summary    Benjamin Rachel Jr. Patient Status:  Inpatient    1950 MRN [id number]   Location [facility] 5SW/SE Attending No att. providers found   Hosp Day # 4 PCP Cole Yousif MD     Date of Admission: 2025 Disposition: Home or Self Care     Date of Discharge: GI Bleed     Admitting Diagnosis: Hemorrhagic shock (HCC) [R57.8]  Symptomatic anemia [D64.9]  Gastrointestinal hemorrhage, unspecified gastrointestinal hemorrhage type [K92.2]    Hospital Discharge Diagnoses: gi bleed     Hospital Discharge Diagnoses: gi bleed     Lace+ Score: 45  59-90 High Risk  29-58 Medium Risk  0-28   Low Risk.    TCM Follow-Up Recommendation:  LACE 29-58: Moderate Risk of readmission after discharge from the hospital.          Lace+ Score: 45  59-90 High Risk  29-58 Medium Risk  0-28   Low Risk    Risk of readmission: Benjamin Rachel Jr. has Moderate Risk of readmission after discharge from the hospital.    Problem List: Problem List[1]    Reason for Admission: gi bleed     Physical Exam:   General appearance: alert, appears stated age and cooperative  Pulmonary:  clear to auscultation bilaterally  Cardiovascular: S1, S2 normal, no murmur, click, rub or gallop, regular rate and rhythm  Abdominal: soft, non-tender; bowel sounds normal; no masses,  no organomegaly  Extremities: extremities normal, atraumatic, no  cyanosis or edema  Psychiatric: calm        History of Present Illness:     This is a 75 year oldmale who presented complaining of diarrhea.  Patient stated symptoms started on the evening prior to admission.  Woke him from sleep.  Described multiple episodes up to 17 times overnight.  Eventually he began feeling generally weak and fatigued.  At one point he felt off balance and had a fall.  Upon further review, patient did note dark black stools.  Patient denied associated abdominal discomfort.  Patient states he is otherwise been in normal state of health this past week.  Denied recent NSAID use.  Patient takes low-dose aspirin daily.  Patient denies other anticoagulation.  At time of interview, patient reports diarrhea decreasing in frequency.  Feeling generally better.  Denies current chest pain, shortness of breath, nausea vomiting, fevers or chills.       Hospital Course:        GI bleed  Acute blood loss anemia  Diarrhea  Multiple episodes of diarrhea with black/dark stools at home  Hb 7.5 on admission, baseline ~15 per chart review  Started on IVF, IV Protonix BID - changed to 40 bid for 8 weeks at dc   Transfused 1U pRBC  Monitor H&H, transfuse as indicated  GI on consult   EGD 5/30 showed a small Blas class IIc duodenal bulb ulcer along with small erosions.  Biopsies negative for H. pylori.   Appreciate recs - repeat hb later today and if stable ok for dc   transfuse if Hb <7      HTN  BP well controlled  Hold home meds at this time  Monitor vitals and titrate meds as indicated  HLD  Continue statin  Hx of colon polyps and sigmoid diverticulosis  Diagnosed on colonoscopy several years ago  Repeat CLN done in Jan 2025  Regular surveillance in 5 years  Opt GI follow up  Severe Venous stasis  - apply eucerin cream  - wound care consult on Monday - apprec input ; [facility]           Chart reviewed, including current vitals, notes, labs and imaging  Labs ordered and  medications adjusted as outlined above  Coordinate care with care team/consultants  Discussed with patient results of tests, management plan as outlined above, and the need for ongoing hospitalization  D/w RN     [facility]           6/1/2025                  Supplementary Documentation:   DVT Mechanical Prophylaxis:   SCDs,    DVT Pharmacologic Prophylaxis   Medication   None                 Code Status: Full Code  Mercado: No urinary catheter in place  Mercado Duration (in days):   Central line:    KAIA: 6/1/2025                            Consultations: Dr Vuong     Procedures: EGD     Complications: none     Discharge Condition: Good    Discharge Medications:      Discharge Medications        START taking these medications        Instructions Prescription details   pantoprazole 40 MG Tbec  Commonly known as: Protonix      Take 1 tablet (40 mg total) by mouth 2 (two) times daily before meals.   Stop taking on: August 1, 2025  Quantity: 120 tablet  Refills: 0            CONTINUE taking these medications        Instructions Prescription details   amLODIPine 10 MG Tabs  Commonly known as: Norvasc      Take 1 tablet (10 mg total) by mouth daily.   Quantity: 90 tablet  Refills: 3     Aspirin Low Dose 81 MG Chew  Generic drug: aspirin      Chew 1 tablet (81 mg total) by mouth in the evening.   Quantity: 1 tablet  Refills: 0     atorvastatin 40 MG Tabs  Commonly known as: Lipitor      Take 1 tablet (40 mg total) by mouth daily.   Quantity: 90 tablet  Refills: 3            STOP taking these medications      lactulose 10 GM/15ML Soln  Commonly known as: CHRONULAC                  Where to Get Your Medications        These medications were sent to [id number] - [place], [place] - [address] [phone], [phone]  [address] [address]      Phone: [phone]   Aspirin Low Dose 81 MG Chew  pantoprazole 40 MG Tbec         Follow up Visits: Follow-up with pcp  in 1 week    Follow up Labs: none      Other  Discharge Instructions: f/u with GI in 2 weeks     Marleny Joya DO  6/2/2025  5:50 PM    > 35 min        [1]   Patient Active Problem List  Diagnosis    Pulsatile tinnitus of left ear    History of colon polyps    Gastrointestinal hemorrhage, unspecified gastrointestinal hemorrhage type    Symptomatic anemia    Hemorrhagic shock (HCC)       Electronically signed by Marleny Joya DO on 6/2/2025  5:53 PM

## (undated) NOTE — LETTER
[facility]  Administration of Anesthesia  I, Benjamin Rachel Jr. agree to be cared for by a physician anesthesiologist alone and/or with a nurse anesthetist, who is specially trained to monitor me and give me medicine to put me to sleep or keep me comfortable during my procedure    I understand that my anesthesiologist and/or anesthetist is not an employee or agent of [facility] or [employer]. He or she works for [facility]    As the patient asking for anesthesia services, I agree to:  Allow the anesthesiologist (anesthesia doctor) to give me medicine and do additional procedures as necessary. Some examples are: Starting or using an “IV” to give me medicine, fluids or blood during my procedure, and having a breathing tube placed to help me breathe when I’m asleep (intubation). In the event that my heart stops working properly, I understand that my anesthesiologist will make every effort to sustain my life, unless otherwise directed by [facility] Do Not Resuscitate documents.  Tell my anesthesia doctor before my procedure:  If I am pregnant.  The last time that I ate or drank.  iii. All of the medicines I take (including prescriptions, herbal supplements, and pills I can buy without a prescription (including street drugs/illegal medications). Failure to inform my anesthesiologist about these medicines may increase my risk of anesthetic complications.  iv.If I am allergic to anything or have had a reaction to anesthesia before.  I understand how the anesthesia medicine will help me (benefits).  I understand that with any type of anesthesia medicine there are risks:  The most common risks are: nausea, vomiting, sore throat, muscle soreness, damage to my eyes, mouth, or teeth (from breathing tube placement).  Rare risks include: remembering what happened during my procedure, allergic reactions to medications, injury to my airway, heart, lungs, vision, nerves, or  muscles and in extremely rare instances death.  My doctor has explained to me other choices available to me for my care (alternatives).  Pregnant Patients (“epidural”):  I understand that the risks of having an epidural (medicine given into my back to help control pain during labor), include itching, low blood pressure, difficulty urinating, headache or slowing of the baby’s heart. Very rare risks include infection, bleeding, seizure, irregular heart rhythms and nerve injury.  Regional Anesthesia (“spinal”, “epidural”, & “nerve blocks”):  I understand that rare but potential complications include headache, bleeding, infection, seizure, irregular heart rhythms, and nerve injury.    _____________________________________________________________________________  Patient (or Representative) Signature/Relationship to Patient  Date   Time    _____________________________________________________________________________   Name (if used)    Language/Organization   Time    _____________________________________________________________________________  Nurse Anesthetist Signature     Date   Time  _____________________________________________________________________________  Anesthesiologist Signature     Date   Time  I have discussed the procedure and information above with the patient (or patient’s representative) and answered their questions. The patient or their representative has agreed to have anesthesia services.    _____________________________________________________________________________  Witness        Date   Time  I have verified that the signature is that of the patient or patient’s representative, and that it was signed before the procedure  Patient Name: Benjamin Rachel      : 1950                 Printed: 2025 at 11:32 AM    Medical Record #: [id number]                                            Page 1 of 1  ----------ANESTHESIA CONSENT----------

## (undated) NOTE — LETTER
[facility]  [address] [place] [address],  [address]       03/19/21        Patient: Rosy Shock.    YOB: 1950   Date of Visit: 3/19/2021       Dear  Dr. Jason Jhaveri MD,      Thank you for referring Rosy Shock. to [facility]

## (undated) NOTE — LETTER
2019              [id number] [address] [facility] [address] [address]        : 54    Dr Karla Dandy  [phone]     Dr Mariana Dutta is a 81year-old man with nasal polyposis and newly-diagnosed hypertens

## (undated) NOTE — LETTER
Hospital Discharge Documentation    From: [facility] Hospitalist's Office  Phone: [phone]    Patient discharged time/date: 2025 12:27 PM  Patient discharge disposition:  Home Health Care Services, [facility]       Discharge Summary - D/C Summary        Discharge Summary signed by Mery Monsalve MD at 2025 11:48 AM  Version 1 of 1      Author: Mery Monsalve MD Service: Hospitalist Author Type: Physician    Filed: 2025 11:48 AM Date of Service: 2025 11:07 AM Status: Signed    : Mery Monsalve MD (Physician)         [facility]  part of [facility]    Discharge Summary    Benjamin Rachel Jr. Patient Status:  Inpatient    1950 MRN [id number]   Location [facility] 5SW/SE Attending Mery Monsalve MD   Hosp Day # 2 PCP Cole Yousif MD     Date of Admission: 2025   Date of Discharge: 2025    Admitting Diagnosis: Gastrointestinal hemorrhage, unspecified gastrointestinal hemorrhage type [K92.2]  Anemia, unspecified type [D64.9]    Disposition: Home    Hospital Discharge Diagnoses: Acute blood loss anemia    Lace+ Score: 62  59-90 High Risk  29-58 Medium Risk  0-28   Low Risk.    [facility] Follow-Up Recommendation:  LACE > 58: High Risk of readmission after discharge from the hospital.        Discharge Diagnosis: .Principal Problem:    Anemia, unspecified type  Active Problems:    Gastrointestinal hemorrhage, unspecified gastrointestinal hemorrhage type    GI bleed    Acute blood loss anemia      Hospital Course:   Reason for Admission:   Per Dr. Hansen  The patient is a 75-year-old  male who was hospitalized on May 29 for melena and gastrointestinal bleed with loose bowel movements.  Required 3 blood transfusions at that time.  EGD done by Gastroenterology showed clean-based ulceration in duodenum, 8 mm, but no active bleed or source of blood loss identified.  The patient was discharged home in a  stable condition, but he continued to have low hemoglobin.  Today he was found to have a hemoglobin of 6.5.  Instructed to come into the emergency department for evaluation.  Repeat hemoglobin was 7.0.  Chemistry was unremarkable.  GFR was 59.  No BUN and creatinine high ratio.  The patient will be admitted to the hospital for further management and capsule endoscopy per Gastroenterology recommendations.       Discharge Physical Exam:   Physical Exam:    General: No acute distress.   Respiratory: Clear to auscultation bilaterally. No wheezes. No rhonchi.  Cardiovascular: S1, S2. Regular rate and rhythm. No murmurs, rubs or gallops.   Abdomen: Soft, nontender, nondistended.  Positive bowel sounds. No rebound or guarding.  Neurologic: No focal neurological deficits.   Musculoskeletal: Moves all extremities.    Hospital Course:   Severe iron def anemia   H/o recent duodenal ulcer   GI on consult.   EGD with video capsule deployment 6/17/25. Prev seen duodenal ulcer resolved.   S/p 1unit PRBC   IV iron   H/h stable. Monitor   PPI BID   Video capsule endoscopy shows bowel wall thickening 6/18  CT entertography done 6/18 showing resolution of duodenal ulcer  Stable for discharge.   HTN   norvasc  Hyperlipidemia   Statin   Constipation   Bowel regimen      Other medical problems  H/o colon polyps   Sigmoid diverticulosis  Venous stasis dermatitis          MDM: High      Complications: none    Consultants         Provider   Role Specialty     Eric Marinelli MD      Consulting Physician Interventional, Cardiology     Ma, Clarisa Garduno MD      Consulting Physician GASTROENTEROLOGY          Surgical Procedures       Case IDs Date Procedure Surgeon Location Status    [id number] 6/17/25 capsule endoscopy Es Del Rio MD [facility] ENDOSCOPY Comp              Discharge Plan:   Discharge Condition: Stable    Current Discharge Medication List        Home Meds - Unchanged    Details   fluticasone propionate 50 MCG/ACT Nasal Suspension 1  spray by Each Nare route 2 (two) times daily.      Ferrous Sulfate (IRON) 325 (65 Fe) MG Oral Tab Take 1 tablet by mouth daily.      !! pantoprazole 40 MG Oral Tab EC Take 1 tablet (40 mg total) by mouth 2 (two) times daily before meals.      ASPIRIN LOW DOSE 81 MG Oral Chew Tab Chew 1 tablet (81 mg total) by mouth in the evening.      atorvastatin 40 MG Oral Tab Take 1 tablet (40 mg total) by mouth daily.      amLODIPine 10 MG Oral Tab Take 1 tablet (10 mg total) by mouth daily.      !! pantoprazole 40 MG Oral Tab EC Take 1 tablet (40 mg total) by mouth every morning before breakfast.       !! - Potential duplicate medications found. Please discuss with provider.              Discharge Diet: As tolerated    Discharge Activity: As tolerated       Discharge Medications        CONTINUE taking these medications        Instructions Prescription details   amLODIPine 10 MG Tabs  Commonly known as: Norvasc      Take 1 tablet (10 mg total) by mouth daily.   Quantity: 90 tablet  Refills: 3     Aspirin Low Dose 81 MG Chew  Generic drug: aspirin      Chew 1 tablet (81 mg total) by mouth in the evening.   Quantity: 1 tablet  Refills: 0     atorvastatin 40 MG Tabs  Commonly known as: Lipitor      Take 1 tablet (40 mg total) by mouth daily.   Quantity: 90 tablet  Refills: 3     fluticasone propionate 50 MCG/ACT Susp  Commonly known as: Flonase      1 spray by Each Nare route 2 (two) times daily.   Refills: 0     Iron 325 (65 Fe) MG Tabs      Take 1 tablet by mouth daily.   Stop taking on: August 12, 2025  Quantity: 90 tablet  Refills: 1     pantoprazole 40 MG Tbec  Commonly known as: Protonix      Take 1 tablet (40 mg total) by mouth 2 (two) times daily before meals.   Stop taking on: August 1, 2025  Quantity: 120 tablet  Refills: 0     pantoprazole 40 MG Tbec  Commonly known as: Protonix  Start taking on: July 31, 2025      Take 1 tablet (40 mg total) by mouth every morning before breakfast.   Quantity: 90 tablet  Refills: 3               Follow up:      Follow-up Information       Eric Marinelli MD Follow up in 1 month(s).    Specialties: Interventional, Cardiology, CARDIOLOGY  Why: Office will call you for follow up appt.  Contact information:  [address] [facility] [address]  [facility] [address] [phone]                             Follow up Labs and imaging:         Other Discharge Instructions:         Please resume home health services with [facility] upon your discharge, [phone].         Time spent:  > 30 minutes    MERY THOMAS MD  6/18/2025      Electronically signed by Mery Thomas MD on 6/19/2025 11:48 AM

## (undated) NOTE — LETTER
Hospital Discharge Documentation    From: [facility] Hospitalist's Office  Phone: [phone]    Patient discharged time/date: 2025  3:50 PM  Patient discharge disposition:  Home or Self Care       Discharge Summary - D/C Summary        Discharge Summary signed by Marleny Joya DO at 2025  5:53 PM  Version 1 of 1      Author: Marleny Joya DO Service: Hospitalist Author Type: Physician    Filed: 2025  5:53 PM Date of Service: 2025  5:50 PM Status: Signed    : Marleny Joya DO (Physician)           [facility]  part of [facility]    Discharge Summary    Benjamin Rachel Jr. Patient Status:  Inpatient    1950 MRN [id number]   Location [facility] 5SW/SE Attending No att. providers found   Hosp Day # 4 PCP Cole Yousif MD     Date of Admission: 2025 Disposition: Home or Self Care     Date of Discharge: GI Bleed     Admitting Diagnosis: Hemorrhagic shock (HCC) [R57.8]  Symptomatic anemia [D64.9]  Gastrointestinal hemorrhage, unspecified gastrointestinal hemorrhage type [K92.2]    Hospital Discharge Diagnoses: gi bleed     Hospital Discharge Diagnoses: gi bleed     Lace+ Score: 45  59-90 High Risk  29-58 Medium Risk  0-28   Low Risk.    TCM Follow-Up Recommendation:  LACE 29-58: Moderate Risk of readmission after discharge from the hospital.          Lace+ Score: 45  59-90 High Risk  29-58 Medium Risk  0-28   Low Risk    Risk of readmission: Benjamin Rachel Jr. has Moderate Risk of readmission after discharge from the hospital.    Problem List: Problem List[1]    Reason for Admission: gi bleed     Physical Exam:   General appearance: alert, appears stated age and cooperative  Pulmonary:  clear to auscultation bilaterally  Cardiovascular: S1, S2 normal, no murmur, click, rub or gallop, regular rate and rhythm  Abdominal: soft, non-tender; bowel sounds normal; no masses,  no organomegaly  Extremities: extremities normal, atraumatic, no cyanosis  or edema  Psychiatric: calm        History of Present Illness:     This is a 75 year oldmale who presented complaining of diarrhea.  Patient stated symptoms started on the evening prior to admission.  Woke him from sleep.  Described multiple episodes up to 17 times overnight.  Eventually he began feeling generally weak and fatigued.  At one point he felt off balance and had a fall.  Upon further review, patient did note dark black stools.  Patient denied associated abdominal discomfort.  Patient states he is otherwise been in normal state of health this past week.  Denied recent NSAID use.  Patient takes low-dose aspirin daily.  Patient denies other anticoagulation.  At time of interview, patient reports diarrhea decreasing in frequency.  Feeling generally better.  Denies current chest pain, shortness of breath, nausea vomiting, fevers or chills.       Hospital Course:        GI bleed  Acute blood loss anemia  Diarrhea  Multiple episodes of diarrhea with black/dark stools at home  Hb 7.5 on admission, baseline ~15 per chart review  Started on IVF, IV Protonix BID - changed to 40 bid for 8 weeks at dc   Transfused 1U pRBC  Monitor H&H, transfuse as indicated  GI on consult   EGD 5/30 showed a small Blas class IIc duodenal bulb ulcer along with small erosions.  Biopsies negative for H. pylori.   Appreciate recs - repeat hb later today and if stable ok for dc   transfuse if Hb <7      HTN  BP well controlled  Hold home meds at this time  Monitor vitals and titrate meds as indicated  HLD  Continue statin  Hx of colon polyps and sigmoid diverticulosis  Diagnosed on colonoscopy several years ago  Repeat CLN done in Jan 2025  Regular surveillance in 5 years  Opt GI follow up  Severe Venous stasis  - apply eucerin cream  - wound care consult on Monday - apprec input ; follows at [facility],            Chart reviewed, including current vitals, notes, labs and imaging  Labs ordered and medications  adjusted as outlined above  Coordinate care with care team/consultants  Discussed with patient results of tests, management plan as outlined above, and the need for ongoing hospitalization  D/w RN     [facility]           6/1/2025                  Supplementary Documentation:   DVT Mechanical Prophylaxis:   SCDs,    DVT Pharmacologic Prophylaxis   Medication   None                 Code Status: Full Code  Mercado: No urinary catheter in place  Mercado Duration (in days):   Central line:    KAIA: 6/1/2025                            Consultations: Dr Vuong     Procedures: EGD     Complications: none     Discharge Condition: Good    Discharge Medications:      Discharge Medications        START taking these medications        Instructions Prescription details   pantoprazole 40 MG Tbec  Commonly known as: Protonix      Take 1 tablet (40 mg total) by mouth 2 (two) times daily before meals.   Stop taking on: August 1, 2025  Quantity: 120 tablet  Refills: 0            CONTINUE taking these medications        Instructions Prescription details   amLODIPine 10 MG Tabs  Commonly known as: Norvasc      Take 1 tablet (10 mg total) by mouth daily.   Quantity: 90 tablet  Refills: 3     Aspirin Low Dose 81 MG Chew  Generic drug: aspirin      Chew 1 tablet (81 mg total) by mouth in the evening.   Quantity: 1 tablet  Refills: 0     atorvastatin 40 MG Tabs  Commonly known as: Lipitor      Take 1 tablet (40 mg total) by mouth daily.   Quantity: 90 tablet  Refills: 3            STOP taking these medications      lactulose 10 GM/15ML Soln  Commonly known as: CHRONULAC                  Where to Get Your Medications        These medications were sent to [id number] - [place], [place] - [address] [phone], [phone]  [address] [address]      Phone: [phone]   Aspirin Low Dose 81 MG Chew  pantoprazole 40 MG Tbec         Follow up Visits: Follow-up with pcp  in 1 week    Follow up Labs: none      Other Discharge  Instructions: f/u with GI in 2 weeks     Marleny Joya DO  6/2/2025  5:50 PM    > 35 min        [1]   Patient Active Problem List  Diagnosis    Pulsatile tinnitus of left ear    History of colon polyps    Gastrointestinal hemorrhage, unspecified gastrointestinal hemorrhage type    Symptomatic anemia    Hemorrhagic shock (HCC)       Electronically signed by Marleny Joya DO on 6/2/2025  5:53 PM

## (undated) NOTE — LETTER
[facility]  Administration of Anesthesia  I, Benjamin Rachel Jr. agree to be cared for by a physician anesthesiologist alone and/or with a nurse anesthetist, who is specially trained to monitor me and give me medicine to put me to sleep or keep me comfortable during my procedure    I understand that my anesthesiologist and/or anesthetist is not an employee or agent of [facility] or [employer]. He or she works for [facility]    As the patient asking for anesthesia services, I agree to:  Allow the anesthesiologist (anesthesia doctor) to give me medicine and do additional procedures as necessary. Some examples are: Starting or using an “IV” to give me medicine, fluids or blood during my procedure, and having a breathing tube placed to help me breathe when I’m asleep (intubation). In the event that my heart stops working properly, I understand that my anesthesiologist will make every effort to sustain my life, unless otherwise directed by [facility] Do Not Resuscitate documents.  Tell my anesthesia doctor before my procedure:  If I am pregnant.  The last time that I ate or drank.  iii. All of the medicines I take (including prescriptions, herbal supplements, and pills I can buy without a prescription (including street drugs/illegal medications). Failure to inform my anesthesiologist about these medicines may increase my risk of anesthetic complications.  iv.If I am allergic to anything or have had a reaction to anesthesia before.  I understand how the anesthesia medicine will help me (benefits).  I understand that with any type of anesthesia medicine there are risks:  The most common risks are: nausea, vomiting, sore throat, muscle soreness, damage to my eyes, mouth, or teeth (from breathing tube placement).  Rare risks include: remembering what happened during my procedure, allergic reactions to medications, injury to my airway, heart, lungs, vision, nerves, or  muscles and in extremely rare instances death.  My doctor has explained to me other choices available to me for my care (alternatives).  Pregnant Patients (“epidural”):  I understand that the risks of having an epidural (medicine given into my back to help control pain during labor), include itching, low blood pressure, difficulty urinating, headache or slowing of the baby’s heart. Very rare risks include infection, bleeding, seizure, irregular heart rhythms and nerve injury.  Regional Anesthesia (“spinal”, “epidural”, & “nerve blocks”):  I understand that rare but potential complications include headache, bleeding, infection, seizure, irregular heart rhythms, and nerve injury.    _____________________________________________________________________________  Patient (or Representative) Signature/Relationship to Patient  Date   Time    _____________________________________________________________________________   Name (if used)    Language/Organization   Time    _____________________________________________________________________________  Nurse Anesthetist Signature     Date   Time  _____________________________________________________________________________  Anesthesiologist Signature     Date   Time  I have discussed the procedure and information above with the patient (or patient’s representative) and answered their questions. The patient or their representative has agreed to have anesthesia services.    _____________________________________________________________________________  Witness        Date   Time  I have verified that the signature is that of the patient or patient’s representative, and that it was signed before the procedure  Patient Name: Benjamin Rachel      : 1950                 Printed: 2025 at 7:42 AM    Medical Record #: [id number]                                            Page 1 of 1  ----------ANESTHESIA CONSENT----------

## (undated) NOTE — LETTER
[facility]  [address], [place], [place]    Authorization for Surgical Operation and Procedure                               I hereby authorize Fareed Jackson MD, my physician and his/her assistants (if applicable), which may include medical students, residents, and/or fellows, to perform the following surgical operation/ procedure and administer such anesthesia as may be determined necessary by my physician: Operation/Procedure name (s) COLONOSCOPY on Benjamin DALI Cunninghamclaritza    2.   I recognize that during the surgical operation/procedure, unforeseen conditions may necessitate additional or different procedures than those listed above.  I, therefore, further authorize and request that the above-named surgeon, assistants, or designees perform such procedures as are, in their judgment, necessary and desirable.    3.   My surgeon/physician has discussed prior to my surgery the potential benefits, risks and side effects of this procedure; the likelihood of achieving goals; and potential problems that might occur during recuperation.  They also discussed reasonable alternatives to the procedure, including risks, benefits, and side effects related to the alternatives and risks related to not receiving this procedure.  I have had all my questions answered and I acknowledge that no guarantee has been made as to the result that may be obtained.    4.   Should the need arise during my operation/procedure, which includes change of level of care prior to discharge, I also consent to the administration of blood and/or blood products.  Further, I understand that despite careful testing and screening of blood or blood products by collecting agencies, I may still be subject to ill effects as a result of receiving a blood transfusion and/or blood products.  The following are some, but not all, of the potential risks that can occur: fever and allergic reactions, hemolytic reactions, transmission of diseases  such as Hepatitis, AIDS and Cytomegalovirus (CMV) and fluid overload.  In the event that I wish to have an autologous transfusion of my own blood, or a directed donor transfusion, I will discuss this with my physician.  Check only if Refusing Blood or Blood Products  I understand refusal of blood or blood products as deemed necessary by my physician may have serious consequences to my condition to include possible death. I hereby assume responsibility for my refusal and release the hospital, its personnel, and my physicians from any responsibility for the consequences of my refusal.    o  Refuse   5.   I authorize the use of any specimen, organs, tissues, body parts or foreign objects that may be removed from my body during the operation/procedure for diagnosis, research or teaching purposes and their subsequent disposal by hospital authorities.  I also authorize the release of specimen test results and/or written reports to my treating physician on the hospital medical staff or other referring or consulting physicians involved in my care, at the discretion of the Pathologist or my treating physician.    6.   I consent to the photographing or videotaping of the operations or procedures to be performed, including appropriate portions of my body for medical, scientific, or educational purposes, provided my identity is not revealed by the pictures or by descriptive texts accompanying them.  If the procedure has been photographed/videotaped, the surgeon will obtain the original picture, image, videotape or CD.  The hospital will not be responsible for storage, release or maintenance of the picture, image, tape or CD.    7.   I consent to the presence of a  or observers in the operating room as deemed necessary by my physician or their designees.    8.   I recognize that in the event my procedure results in extended X-Ray/fluoroscopy time, I may develop a skin reaction.    9. If I have a Do Not Attempt  Resuscitation (DNAR) order in place, that status will be suspended while in the operating room, procedural suite, and during the recovery period unless otherwise explicitly stated by me (or a person authorized to consent on my behalf). The surgeon or my attending physician will determine when the applicable recovery period ends for purposes of reinstating the DNAR order.  10. Patients having a sterilization procedure: I understand that if the procedure is successful the results will be permanent and it will therefore be impossible for me to inseminate, conceive, or bear children.  I also understand that the procedure is intended to result in sterility, although the result has not been guaranteed.   11. I acknowledge that my physician has explained sedation/analgesia administration to me including the risk and benefits I consent to the administration of sedation/analgesia as may be necessary or desirable in the judgment of my physician.    I CERTIFY THAT I HAVE READ AND FULLY UNDERSTAND THE ABOVE CONSENT TO OPERATION and/or OTHER PROCEDURE.     ____________________________________  _________________________________        ______________________________  Signature of Patient    Signature of Responsible Person                Printed Name of Responsible Person                                      ____________________________________  _____________________________                ________________________________  Signature of Witness        Date  Time         Relationship to Patient    STATEMENT OF PHYSICIAN My signature below affirms that prior to the time of the procedure; I have explained to the patient and/or his/her legal representative, the risks and benefits involved in the proposed treatment and any reasonable alternative to the proposed treatment. I have also explained the risks and benefits involved in refusal of the proposed treatment and alternatives to the proposed treatment and have answered the patient's  questions. If I have a significant financial interest in a co-management agreement or a significant financial interest in any product or implant, or other significant relationship used in this procedure/surgery, I have disclosed this and had a discussion with my patient.     _____________________________________________________              _____________________________  (Signature of Physician)                                                                                         (Date)                                   (Time)  Patient Name: Benjamin Rachel Jr.      : 1950      Printed: 2025     Medical Record #: [id number]                                      Page 1 of 1